# Patient Record
Sex: MALE | Race: WHITE | NOT HISPANIC OR LATINO | Employment: OTHER | URBAN - METROPOLITAN AREA
[De-identification: names, ages, dates, MRNs, and addresses within clinical notes are randomized per-mention and may not be internally consistent; named-entity substitution may affect disease eponyms.]

---

## 2017-01-14 ENCOUNTER — COMMUNICATION - HEALTHEAST (OUTPATIENT)
Dept: FAMILY MEDICINE | Facility: CLINIC | Age: 67
End: 2017-01-14

## 2017-01-14 DIAGNOSIS — E78.00 PURE HYPERCHOLESTEROLEMIA: ICD-10-CM

## 2017-03-25 ENCOUNTER — COMMUNICATION - HEALTHEAST (OUTPATIENT)
Dept: FAMILY MEDICINE | Facility: CLINIC | Age: 67
End: 2017-03-25

## 2017-03-27 ENCOUNTER — COMMUNICATION - HEALTHEAST (OUTPATIENT)
Dept: FAMILY MEDICINE | Facility: CLINIC | Age: 67
End: 2017-03-27

## 2017-04-17 ENCOUNTER — OFFICE VISIT - HEALTHEAST (OUTPATIENT)
Dept: FAMILY MEDICINE | Facility: CLINIC | Age: 67
End: 2017-04-17

## 2017-04-17 DIAGNOSIS — M79.644 THUMB PAIN, RIGHT: ICD-10-CM

## 2017-04-17 DIAGNOSIS — R20.0 LEFT ARM NUMBNESS: ICD-10-CM

## 2017-04-17 ASSESSMENT — MIFFLIN-ST. JEOR: SCORE: 1699.73

## 2017-04-21 ENCOUNTER — RECORDS - HEALTHEAST (OUTPATIENT)
Dept: ADMINISTRATIVE | Facility: OTHER | Age: 67
End: 2017-04-21

## 2017-05-24 ENCOUNTER — RECORDS - HEALTHEAST (OUTPATIENT)
Dept: ADMINISTRATIVE | Facility: OTHER | Age: 67
End: 2017-05-24

## 2017-06-08 ENCOUNTER — RECORDS - HEALTHEAST (OUTPATIENT)
Dept: ADMINISTRATIVE | Facility: OTHER | Age: 67
End: 2017-06-08

## 2017-07-31 ENCOUNTER — COMMUNICATION - HEALTHEAST (OUTPATIENT)
Dept: FAMILY MEDICINE | Facility: CLINIC | Age: 67
End: 2017-07-31

## 2017-07-31 DIAGNOSIS — L98.9 SKIN LESION: ICD-10-CM

## 2017-08-21 ENCOUNTER — COMMUNICATION - HEALTHEAST (OUTPATIENT)
Dept: FAMILY MEDICINE | Facility: CLINIC | Age: 67
End: 2017-08-21

## 2017-08-21 DIAGNOSIS — I10 ESSENTIAL HYPERTENSION, BENIGN: ICD-10-CM

## 2017-08-30 ENCOUNTER — OFFICE VISIT - HEALTHEAST (OUTPATIENT)
Dept: FAMILY MEDICINE | Facility: CLINIC | Age: 67
End: 2017-08-30

## 2017-08-30 DIAGNOSIS — K21.00 REFLUX ESOPHAGITIS: ICD-10-CM

## 2017-08-30 DIAGNOSIS — M47.817 LUMBOSACRAL SPONDYLOSIS WITHOUT MYELOPATHY: ICD-10-CM

## 2017-08-30 DIAGNOSIS — I10 ESSENTIAL HYPERTENSION, BENIGN: ICD-10-CM

## 2017-08-30 DIAGNOSIS — E78.00 PURE HYPERCHOLESTEROLEMIA: ICD-10-CM

## 2017-08-30 DIAGNOSIS — I25.10 CORONARY ATHEROSCLEROSIS: ICD-10-CM

## 2017-08-30 LAB — LDLC SERPL CALC-MCNC: 87 MG/DL

## 2017-08-30 ASSESSMENT — MIFFLIN-ST. JEOR: SCORE: 1700.86

## 2017-09-29 ENCOUNTER — COMMUNICATION - HEALTHEAST (OUTPATIENT)
Dept: ADMINISTRATIVE | Facility: CLINIC | Age: 67
End: 2017-09-29

## 2017-10-17 ENCOUNTER — COMMUNICATION - HEALTHEAST (OUTPATIENT)
Dept: FAMILY MEDICINE | Facility: CLINIC | Age: 67
End: 2017-10-17

## 2017-10-17 DIAGNOSIS — E78.00 PURE HYPERCHOLESTEROLEMIA: ICD-10-CM

## 2017-10-31 ENCOUNTER — COMMUNICATION - HEALTHEAST (OUTPATIENT)
Dept: FAMILY MEDICINE | Facility: CLINIC | Age: 67
End: 2017-10-31

## 2017-11-07 ENCOUNTER — RECORDS - HEALTHEAST (OUTPATIENT)
Dept: ADMINISTRATIVE | Facility: OTHER | Age: 67
End: 2017-11-07

## 2017-11-15 ENCOUNTER — COMMUNICATION - HEALTHEAST (OUTPATIENT)
Dept: CARDIOLOGY | Facility: CLINIC | Age: 67
End: 2017-11-15

## 2017-11-15 DIAGNOSIS — I10 HYPERTENSION: ICD-10-CM

## 2017-11-17 ENCOUNTER — COMMUNICATION - HEALTHEAST (OUTPATIENT)
Dept: FAMILY MEDICINE | Facility: CLINIC | Age: 67
End: 2017-11-17

## 2017-11-17 DIAGNOSIS — I10 HYPERTENSION: ICD-10-CM

## 2017-12-15 ENCOUNTER — COMMUNICATION - HEALTHEAST (OUTPATIENT)
Dept: CARDIOLOGY | Facility: CLINIC | Age: 67
End: 2017-12-15

## 2017-12-15 DIAGNOSIS — I10 HYPERTENSION: ICD-10-CM

## 2017-12-28 ENCOUNTER — OFFICE VISIT - HEALTHEAST (OUTPATIENT)
Dept: CARDIOLOGY | Facility: CLINIC | Age: 67
End: 2017-12-28

## 2017-12-28 DIAGNOSIS — I10 ESSENTIAL HYPERTENSION: ICD-10-CM

## 2017-12-28 DIAGNOSIS — I25.10 CORONARY ARTERY DISEASE INVOLVING NATIVE CORONARY ARTERY OF NATIVE HEART WITHOUT ANGINA PECTORIS: ICD-10-CM

## 2017-12-28 DIAGNOSIS — E78.5 DYSLIPIDEMIA: ICD-10-CM

## 2017-12-28 ASSESSMENT — MIFFLIN-ST. JEOR: SCORE: 1719.01

## 2018-01-19 ENCOUNTER — RECORDS - HEALTHEAST (OUTPATIENT)
Dept: ADMINISTRATIVE | Facility: OTHER | Age: 68
End: 2018-01-19

## 2018-02-09 ENCOUNTER — COMMUNICATION - HEALTHEAST (OUTPATIENT)
Dept: FAMILY MEDICINE | Facility: CLINIC | Age: 68
End: 2018-02-09

## 2018-02-09 DIAGNOSIS — I10 ESSENTIAL HYPERTENSION, BENIGN: ICD-10-CM

## 2018-04-18 ENCOUNTER — OFFICE VISIT - HEALTHEAST (OUTPATIENT)
Dept: FAMILY MEDICINE | Facility: CLINIC | Age: 68
End: 2018-04-18

## 2018-04-18 DIAGNOSIS — I25.10 CORONARY ATHEROSCLEROSIS: ICD-10-CM

## 2018-04-18 DIAGNOSIS — M47.817 LUMBOSACRAL SPONDYLOSIS WITHOUT MYELOPATHY: ICD-10-CM

## 2018-04-18 DIAGNOSIS — E78.00 PURE HYPERCHOLESTEROLEMIA: ICD-10-CM

## 2018-04-18 DIAGNOSIS — M54.9 BACK PAIN: ICD-10-CM

## 2018-04-18 DIAGNOSIS — I10 ESSENTIAL HYPERTENSION, BENIGN: ICD-10-CM

## 2018-04-18 ASSESSMENT — MIFFLIN-ST. JEOR: SCORE: 1709.94

## 2018-04-19 ENCOUNTER — AMBULATORY - HEALTHEAST (OUTPATIENT)
Dept: LAB | Facility: CLINIC | Age: 68
End: 2018-04-19

## 2018-04-19 DIAGNOSIS — M54.9 BACK PAIN: ICD-10-CM

## 2018-04-19 DIAGNOSIS — M47.817 LUMBOSACRAL SPONDYLOSIS WITHOUT MYELOPATHY: ICD-10-CM

## 2018-04-19 DIAGNOSIS — I10 ESSENTIAL HYPERTENSION, BENIGN: ICD-10-CM

## 2018-04-19 DIAGNOSIS — E78.00 PURE HYPERCHOLESTEROLEMIA: ICD-10-CM

## 2018-04-19 DIAGNOSIS — I25.10 CORONARY ATHEROSCLEROSIS: ICD-10-CM

## 2018-04-19 LAB
ALBUMIN SERPL-MCNC: 3.9 G/DL (ref 3.5–5)
ALP SERPL-CCNC: 86 U/L (ref 45–120)
ALT SERPL W P-5'-P-CCNC: 37 U/L (ref 0–45)
ANION GAP SERPL CALCULATED.3IONS-SCNC: 13 MMOL/L (ref 5–18)
AST SERPL W P-5'-P-CCNC: 26 U/L (ref 0–40)
BILIRUB DIRECT SERPL-MCNC: 0.2 MG/DL
BILIRUB SERPL-MCNC: 0.6 MG/DL (ref 0–1)
BUN SERPL-MCNC: 11 MG/DL (ref 8–22)
CALCIUM SERPL-MCNC: 9.9 MG/DL (ref 8.5–10.5)
CHLORIDE BLD-SCNC: 103 MMOL/L (ref 98–107)
CHOLEST SERPL-MCNC: 129 MG/DL
CO2 SERPL-SCNC: 25 MMOL/L (ref 22–31)
CREAT SERPL-MCNC: 0.84 MG/DL (ref 0.7–1.3)
FASTING STATUS PATIENT QL REPORTED: YES
GFR SERPL CREATININE-BSD FRML MDRD: >60 ML/MIN/1.73M2
GLUCOSE BLD-MCNC: 89 MG/DL (ref 70–125)
HDLC SERPL-MCNC: 45 MG/DL
HGB BLD-MCNC: 15 G/DL (ref 14–18)
LDLC SERPL CALC-MCNC: 62 MG/DL
POTASSIUM BLD-SCNC: 4.8 MMOL/L (ref 3.5–5)
PROT SERPL-MCNC: 7 G/DL (ref 6–8)
SODIUM SERPL-SCNC: 141 MMOL/L (ref 136–145)
TRIGL SERPL-MCNC: 108 MG/DL

## 2018-04-20 LAB
25(OH)D3 SERPL-MCNC: 38 NG/ML (ref 30–80)
25(OH)D3 SERPL-MCNC: 38 NG/ML (ref 30–80)

## 2018-04-23 ENCOUNTER — COMMUNICATION - HEALTHEAST (OUTPATIENT)
Dept: FAMILY MEDICINE | Facility: CLINIC | Age: 68
End: 2018-04-23

## 2018-04-23 DIAGNOSIS — E78.00 PURE HYPERCHOLESTEROLEMIA: ICD-10-CM

## 2018-04-24 ENCOUNTER — COMMUNICATION - HEALTHEAST (OUTPATIENT)
Dept: CARDIOLOGY | Facility: CLINIC | Age: 68
End: 2018-04-24

## 2018-04-24 DIAGNOSIS — I10 HYPERTENSION: ICD-10-CM

## 2018-06-15 ENCOUNTER — RECORDS - HEALTHEAST (OUTPATIENT)
Dept: ADMINISTRATIVE | Facility: OTHER | Age: 68
End: 2018-06-15

## 2018-07-30 ENCOUNTER — COMMUNICATION - HEALTHEAST (OUTPATIENT)
Dept: FAMILY MEDICINE | Facility: CLINIC | Age: 68
End: 2018-07-30

## 2018-07-30 DIAGNOSIS — I10 ESSENTIAL HYPERTENSION, BENIGN: ICD-10-CM

## 2018-08-07 ENCOUNTER — RECORDS - HEALTHEAST (OUTPATIENT)
Dept: ADMINISTRATIVE | Facility: OTHER | Age: 68
End: 2018-08-07

## 2018-09-20 ENCOUNTER — COMMUNICATION - HEALTHEAST (OUTPATIENT)
Dept: CARDIOLOGY | Facility: CLINIC | Age: 68
End: 2018-09-20

## 2018-09-20 ENCOUNTER — COMMUNICATION - HEALTHEAST (OUTPATIENT)
Dept: FAMILY MEDICINE | Facility: CLINIC | Age: 68
End: 2018-09-20

## 2018-09-26 ENCOUNTER — AMBULATORY - HEALTHEAST (OUTPATIENT)
Dept: FAMILY MEDICINE | Facility: CLINIC | Age: 68
End: 2018-09-26

## 2018-09-26 ENCOUNTER — AMBULATORY - HEALTHEAST (OUTPATIENT)
Dept: NURSING | Facility: CLINIC | Age: 68
End: 2018-09-26

## 2018-09-26 ENCOUNTER — RECORDS - HEALTHEAST (OUTPATIENT)
Dept: ADMINISTRATIVE | Facility: OTHER | Age: 68
End: 2018-09-26

## 2018-09-26 DIAGNOSIS — Z23 NEED FOR VACCINATION: ICD-10-CM

## 2018-11-14 ENCOUNTER — OFFICE VISIT - HEALTHEAST (OUTPATIENT)
Dept: FAMILY MEDICINE | Facility: CLINIC | Age: 68
End: 2018-11-14

## 2018-11-14 DIAGNOSIS — M17.11 PRIMARY OSTEOARTHRITIS OF RIGHT KNEE: ICD-10-CM

## 2018-11-14 DIAGNOSIS — I10 ESSENTIAL HYPERTENSION, BENIGN: ICD-10-CM

## 2018-11-14 DIAGNOSIS — I25.10 ATHEROSCLEROSIS OF NATIVE CORONARY ARTERY OF NATIVE HEART WITHOUT ANGINA PECTORIS: ICD-10-CM

## 2018-11-14 DIAGNOSIS — E78.00 PURE HYPERCHOLESTEROLEMIA: ICD-10-CM

## 2018-11-14 DIAGNOSIS — N52.8 OTHER MALE ERECTILE DYSFUNCTION: ICD-10-CM

## 2018-11-14 DIAGNOSIS — M47.817 LUMBOSACRAL SPONDYLOSIS WITHOUT MYELOPATHY: ICD-10-CM

## 2018-11-14 LAB
ALBUMIN SERPL-MCNC: 4.1 G/DL (ref 3.5–5)
ALP SERPL-CCNC: 97 U/L (ref 45–120)
ALT SERPL W P-5'-P-CCNC: 27 U/L (ref 0–45)
ANION GAP SERPL CALCULATED.3IONS-SCNC: 11 MMOL/L (ref 5–18)
AST SERPL W P-5'-P-CCNC: 22 U/L (ref 0–40)
BILIRUB DIRECT SERPL-MCNC: <0.1 MG/DL
BILIRUB SERPL-MCNC: 0.3 MG/DL (ref 0–1)
BUN SERPL-MCNC: 14 MG/DL (ref 8–22)
CALCIUM SERPL-MCNC: 9.8 MG/DL (ref 8.5–10.5)
CHLORIDE BLD-SCNC: 101 MMOL/L (ref 98–107)
CO2 SERPL-SCNC: 29 MMOL/L (ref 22–31)
CREAT SERPL-MCNC: 0.82 MG/DL (ref 0.7–1.3)
GFR SERPL CREATININE-BSD FRML MDRD: >60 ML/MIN/1.73M2
GLUCOSE BLD-MCNC: 130 MG/DL (ref 70–125)
LDLC SERPL CALC-MCNC: 85 MG/DL
POTASSIUM BLD-SCNC: 4.2 MMOL/L (ref 3.5–5)
PROT SERPL-MCNC: 7.1 G/DL (ref 6–8)
SODIUM SERPL-SCNC: 141 MMOL/L (ref 136–145)

## 2018-11-14 RX ORDER — SILDENAFIL 50 MG/1
50 TABLET, FILM COATED ORAL DAILY PRN
Qty: 16 TABLET | Refills: 5 | Status: SHIPPED | OUTPATIENT
Start: 2018-11-14

## 2018-12-18 ENCOUNTER — COMMUNICATION - HEALTHEAST (OUTPATIENT)
Dept: CARDIOLOGY | Facility: CLINIC | Age: 68
End: 2018-12-18

## 2018-12-18 DIAGNOSIS — I25.10 ATHEROSCLEROSIS OF NATIVE CORONARY ARTERY OF NATIVE HEART WITHOUT ANGINA PECTORIS: ICD-10-CM

## 2018-12-21 ENCOUNTER — HOSPITAL ENCOUNTER (OUTPATIENT)
Dept: CARDIOLOGY | Facility: HOSPITAL | Age: 68
Discharge: HOME | End: 2018-12-21
Attending: INTERNAL MEDICINE

## 2018-12-21 ENCOUNTER — COMMUNICATION - HEALTHEAST (OUTPATIENT)
Dept: CARDIOLOGY | Facility: CLINIC | Age: 68
End: 2018-12-21

## 2018-12-21 DIAGNOSIS — I10 HYPERTENSION: ICD-10-CM

## 2018-12-21 DIAGNOSIS — I25.10 ATHEROSCLEROSIS OF NATIVE CORONARY ARTERY OF NATIVE HEART WITHOUT ANGINA PECTORIS: ICD-10-CM

## 2018-12-21 LAB
CV STRESS CURRENT BP HE: NORMAL
CV STRESS CURRENT HR HE: 102
CV STRESS CURRENT HR HE: 105
CV STRESS CURRENT HR HE: 106
CV STRESS CURRENT HR HE: 107
CV STRESS CURRENT HR HE: 107
CV STRESS CURRENT HR HE: 114
CV STRESS CURRENT HR HE: 115
CV STRESS CURRENT HR HE: 117
CV STRESS CURRENT HR HE: 118
CV STRESS CURRENT HR HE: 119
CV STRESS CURRENT HR HE: 120
CV STRESS CURRENT HR HE: 124
CV STRESS CURRENT HR HE: 128
CV STRESS CURRENT HR HE: 129
CV STRESS CURRENT HR HE: 129
CV STRESS CURRENT HR HE: 132
CV STRESS CURRENT HR HE: 134
CV STRESS CURRENT HR HE: 137
CV STRESS CURRENT HR HE: 137
CV STRESS CURRENT HR HE: 86
CV STRESS CURRENT HR HE: 87
CV STRESS CURRENT HR HE: 88
CV STRESS CURRENT HR HE: 88
CV STRESS CURRENT HR HE: 90
CV STRESS CURRENT HR HE: 91
CV STRESS CURRENT HR HE: 92
CV STRESS CURRENT HR HE: 92
CV STRESS CURRENT HR HE: 96
CV STRESS CURRENT HR HE: 98
CV STRESS CURRENT HR HE: 99
CV STRESS DEVIATION TIME HE: NORMAL
CV STRESS ECHO PERCENT HR HE: NORMAL
CV STRESS EXERCISE STAGE HE: NORMAL
CV STRESS EXERCISE STAGE REACHED HE: NORMAL
CV STRESS FINAL RESTING BP HE: NORMAL
CV STRESS FINAL RESTING HR HE: 90
CV STRESS MAX HR HE: 138
CV STRESS MAX TREADMILL GRADE HE: 16
CV STRESS MAX TREADMILL SPEED HE: 4.2
CV STRESS PEAK DIA BP HE: NORMAL
CV STRESS PEAK SYS BP HE: NORMAL
CV STRESS PHASE HE: NORMAL
CV STRESS PROTOCOL HE: NORMAL
CV STRESS RESTING PT POSITION HE: NORMAL
CV STRESS RESTING PT POSITION HE: NORMAL
CV STRESS ST DEVIATION AMOUNT HE: NORMAL
CV STRESS ST DEVIATION ELEVATION HE: NORMAL
CV STRESS ST EVELATION AMOUNT HE: NORMAL
CV STRESS TEST TYPE HE: NORMAL
CV STRESS TOTAL STAGE TIME MIN 1 HE: NORMAL
STRESS ECHO BASELINE BP: NORMAL
STRESS ECHO BASELINE HR: 89
STRESS ECHO CALCULATED PERCENT HR: 92 %
STRESS ECHO LAST STRESS BP: NORMAL
STRESS ECHO LAST STRESS HR: 137
STRESS ECHO POST ESTIMATED WORKLOAD: 10.5
STRESS ECHO POST EXERCISE DUR MIN: 9
STRESS ECHO POST EXERCISE DUR SEC: 0
STRESS ECHO TARGET HR: 138

## 2018-12-24 ENCOUNTER — AMBULATORY - HEALTHEAST (OUTPATIENT)
Dept: FAMILY MEDICINE | Facility: CLINIC | Age: 68
End: 2018-12-24

## 2018-12-27 ENCOUNTER — COMMUNICATION - HEALTHEAST (OUTPATIENT)
Dept: CARDIOLOGY | Facility: CLINIC | Age: 68
End: 2018-12-27

## 2018-12-28 ENCOUNTER — OFFICE VISIT - HEALTHEAST (OUTPATIENT)
Dept: CARDIOLOGY | Facility: CLINIC | Age: 68
End: 2018-12-28

## 2018-12-28 DIAGNOSIS — E78.5 DYSLIPIDEMIA: ICD-10-CM

## 2018-12-28 DIAGNOSIS — I25.10 CORONARY ARTERY DISEASE INVOLVING NATIVE CORONARY ARTERY OF NATIVE HEART WITHOUT ANGINA PECTORIS: ICD-10-CM

## 2018-12-28 DIAGNOSIS — I10 ESSENTIAL HYPERTENSION: ICD-10-CM

## 2018-12-28 ASSESSMENT — MIFFLIN-ST. JEOR: SCORE: 1714.47

## 2019-01-24 ENCOUNTER — OFFICE VISIT - HEALTHEAST (OUTPATIENT)
Dept: FAMILY MEDICINE | Facility: CLINIC | Age: 69
End: 2019-01-24

## 2019-01-24 DIAGNOSIS — M70.61 TROCHANTERIC BURSITIS OF BOTH HIPS: ICD-10-CM

## 2019-01-24 DIAGNOSIS — M70.62 TROCHANTERIC BURSITIS OF BOTH HIPS: ICD-10-CM

## 2019-01-29 ENCOUNTER — OFFICE VISIT - HEALTHEAST (OUTPATIENT)
Dept: FAMILY MEDICINE | Facility: CLINIC | Age: 69
End: 2019-01-29

## 2019-01-29 DIAGNOSIS — M70.61 TROCHANTERIC BURSITIS OF BOTH HIPS: ICD-10-CM

## 2019-01-29 DIAGNOSIS — M70.62 TROCHANTERIC BURSITIS OF BOTH HIPS: ICD-10-CM

## 2019-02-12 ENCOUNTER — COMMUNICATION - HEALTHEAST (OUTPATIENT)
Dept: CARDIOLOGY | Facility: CLINIC | Age: 69
End: 2019-02-12

## 2019-02-12 DIAGNOSIS — I10 HYPERTENSION: ICD-10-CM

## 2019-04-24 ENCOUNTER — COMMUNICATION - HEALTHEAST (OUTPATIENT)
Dept: FAMILY MEDICINE | Facility: CLINIC | Age: 69
End: 2019-04-24

## 2019-04-24 DIAGNOSIS — I10 ESSENTIAL HYPERTENSION, BENIGN: ICD-10-CM

## 2019-04-24 DIAGNOSIS — E78.00 PURE HYPERCHOLESTEROLEMIA: ICD-10-CM

## 2019-05-23 ENCOUNTER — OFFICE VISIT - HEALTHEAST (OUTPATIENT)
Dept: FAMILY MEDICINE | Facility: CLINIC | Age: 69
End: 2019-05-23

## 2019-05-23 DIAGNOSIS — M17.11 PRIMARY OSTEOARTHRITIS OF RIGHT KNEE: ICD-10-CM

## 2019-05-23 DIAGNOSIS — I25.10 ATHEROSCLEROSIS OF NATIVE CORONARY ARTERY OF NATIVE HEART WITHOUT ANGINA PECTORIS: ICD-10-CM

## 2019-05-23 DIAGNOSIS — E78.00 PURE HYPERCHOLESTEROLEMIA: ICD-10-CM

## 2019-05-23 DIAGNOSIS — M47.817 LUMBOSACRAL SPONDYLOSIS WITHOUT MYELOPATHY: ICD-10-CM

## 2019-05-23 DIAGNOSIS — I10 ESSENTIAL HYPERTENSION, BENIGN: ICD-10-CM

## 2019-05-23 DIAGNOSIS — R73.9 HYPERGLYCEMIA: ICD-10-CM

## 2019-05-23 DIAGNOSIS — Z13.1 SCREENING FOR DIABETES MELLITUS: ICD-10-CM

## 2019-05-23 ASSESSMENT — MIFFLIN-ST. JEOR: SCORE: 1700.86

## 2019-06-17 ENCOUNTER — RECORDS - HEALTHEAST (OUTPATIENT)
Dept: ADMINISTRATIVE | Facility: OTHER | Age: 69
End: 2019-06-17

## 2019-06-27 ENCOUNTER — AMBULATORY - HEALTHEAST (OUTPATIENT)
Dept: LAB | Facility: CLINIC | Age: 69
End: 2019-06-27

## 2019-06-27 DIAGNOSIS — E78.00 PURE HYPERCHOLESTEROLEMIA: ICD-10-CM

## 2019-06-27 DIAGNOSIS — M17.11 PRIMARY OSTEOARTHRITIS OF RIGHT KNEE: ICD-10-CM

## 2019-06-27 DIAGNOSIS — I10 ESSENTIAL HYPERTENSION, BENIGN: ICD-10-CM

## 2019-06-27 DIAGNOSIS — Z13.1 SCREENING FOR DIABETES MELLITUS: ICD-10-CM

## 2019-06-27 DIAGNOSIS — R73.9 HYPERGLYCEMIA: ICD-10-CM

## 2019-06-27 DIAGNOSIS — I25.10 ATHEROSCLEROSIS OF NATIVE CORONARY ARTERY OF NATIVE HEART WITHOUT ANGINA PECTORIS: ICD-10-CM

## 2019-06-27 DIAGNOSIS — M47.817 LUMBOSACRAL SPONDYLOSIS WITHOUT MYELOPATHY: ICD-10-CM

## 2019-06-27 LAB
ALBUMIN SERPL-MCNC: 4.1 G/DL (ref 3.5–5)
ALP SERPL-CCNC: 67 U/L (ref 45–120)
ALT SERPL W P-5'-P-CCNC: 26 U/L (ref 0–45)
ANION GAP SERPL CALCULATED.3IONS-SCNC: 7 MMOL/L (ref 5–18)
AST SERPL W P-5'-P-CCNC: 21 U/L (ref 0–40)
BILIRUB DIRECT SERPL-MCNC: 0.3 MG/DL
BILIRUB SERPL-MCNC: 0.7 MG/DL (ref 0–1)
BUN SERPL-MCNC: 11 MG/DL (ref 8–28)
CALCIUM SERPL-MCNC: 9.6 MG/DL (ref 8.5–10.5)
CHLORIDE BLD-SCNC: 104 MMOL/L (ref 98–107)
CHOLEST SERPL-MCNC: 133 MG/DL
CO2 SERPL-SCNC: 28 MMOL/L (ref 22–31)
CREAT SERPL-MCNC: 0.83 MG/DL (ref 0.7–1.3)
FASTING STATUS PATIENT QL REPORTED: YES
GFR SERPL CREATININE-BSD FRML MDRD: >60 ML/MIN/1.73M2
GLUCOSE BLD-MCNC: 88 MG/DL (ref 70–125)
HBA1C MFR BLD: 5.2 % (ref 3.5–6)
HDLC SERPL-MCNC: 43 MG/DL
LDLC SERPL CALC-MCNC: 70 MG/DL
POTASSIUM BLD-SCNC: 4.2 MMOL/L (ref 3.5–5)
PROT SERPL-MCNC: 6.4 G/DL (ref 6–8)
SODIUM SERPL-SCNC: 139 MMOL/L (ref 136–145)
TRIGL SERPL-MCNC: 98 MG/DL

## 2019-07-23 ENCOUNTER — COMMUNICATION - HEALTHEAST (OUTPATIENT)
Dept: FAMILY MEDICINE | Facility: CLINIC | Age: 69
End: 2019-07-23

## 2019-07-23 DIAGNOSIS — E78.00 PURE HYPERCHOLESTEROLEMIA: ICD-10-CM

## 2019-07-23 DIAGNOSIS — I10 ESSENTIAL HYPERTENSION, BENIGN: ICD-10-CM

## 2019-11-05 ENCOUNTER — AMBULATORY - HEALTHEAST (OUTPATIENT)
Dept: NURSING | Facility: CLINIC | Age: 69
End: 2019-11-05

## 2019-11-05 DIAGNOSIS — Z23 FLU VACCINE NEED: ICD-10-CM

## 2019-12-09 ENCOUNTER — COMMUNICATION - HEALTHEAST (OUTPATIENT)
Dept: CARDIOLOGY | Facility: CLINIC | Age: 69
End: 2019-12-09

## 2019-12-09 DIAGNOSIS — I10 HYPERTENSION: ICD-10-CM

## 2019-12-09 DIAGNOSIS — I10 ESSENTIAL HYPERTENSION: ICD-10-CM

## 2019-12-16 ENCOUNTER — OFFICE VISIT - HEALTHEAST (OUTPATIENT)
Dept: FAMILY MEDICINE | Facility: CLINIC | Age: 69
End: 2019-12-16

## 2019-12-16 DIAGNOSIS — E78.00 PURE HYPERCHOLESTEROLEMIA: ICD-10-CM

## 2019-12-16 DIAGNOSIS — I10 ESSENTIAL HYPERTENSION, BENIGN: ICD-10-CM

## 2019-12-16 DIAGNOSIS — I25.10 ATHEROSCLEROSIS OF NATIVE CORONARY ARTERY OF NATIVE HEART WITHOUT ANGINA PECTORIS: ICD-10-CM

## 2019-12-16 DIAGNOSIS — G56.03 BILATERAL CARPAL TUNNEL SYNDROME: ICD-10-CM

## 2019-12-16 ASSESSMENT — MIFFLIN-ST. JEOR: SCORE: 1705.4

## 2019-12-17 LAB
ALBUMIN SERPL-MCNC: 4.3 G/DL (ref 3.5–5)
ALP SERPL-CCNC: 82 U/L (ref 45–120)
ALT SERPL W P-5'-P-CCNC: 28 U/L (ref 0–45)
ANION GAP SERPL CALCULATED.3IONS-SCNC: 9 MMOL/L (ref 5–18)
AST SERPL W P-5'-P-CCNC: 22 U/L (ref 0–40)
BILIRUB DIRECT SERPL-MCNC: 0.1 MG/DL
BILIRUB SERPL-MCNC: 0.4 MG/DL (ref 0–1)
BUN SERPL-MCNC: 16 MG/DL (ref 8–28)
CALCIUM SERPL-MCNC: 9.7 MG/DL (ref 8.5–10.5)
CHLORIDE BLD-SCNC: 102 MMOL/L (ref 98–107)
CO2 SERPL-SCNC: 29 MMOL/L (ref 22–31)
CREAT SERPL-MCNC: 0.98 MG/DL (ref 0.7–1.3)
GFR SERPL CREATININE-BSD FRML MDRD: >60 ML/MIN/1.73M2
GLUCOSE BLD-MCNC: 85 MG/DL (ref 70–125)
LDLC SERPL CALC-MCNC: 82 MG/DL
POTASSIUM BLD-SCNC: 4.1 MMOL/L (ref 3.5–5)
PROT SERPL-MCNC: 6.9 G/DL (ref 6–8)
SODIUM SERPL-SCNC: 140 MMOL/L (ref 136–145)

## 2020-01-02 ENCOUNTER — OFFICE VISIT - HEALTHEAST (OUTPATIENT)
Dept: CARDIOLOGY | Facility: CLINIC | Age: 70
End: 2020-01-02

## 2020-01-02 DIAGNOSIS — E78.5 DYSLIPIDEMIA: ICD-10-CM

## 2020-01-02 DIAGNOSIS — I10 ESSENTIAL HYPERTENSION: ICD-10-CM

## 2020-01-02 DIAGNOSIS — I25.10 CORONARY ARTERY DISEASE INVOLVING NATIVE CORONARY ARTERY OF NATIVE HEART WITHOUT ANGINA PECTORIS: ICD-10-CM

## 2020-01-02 ASSESSMENT — MIFFLIN-ST. JEOR: SCORE: 1723.54

## 2020-01-14 ENCOUNTER — COMMUNICATION - HEALTHEAST (OUTPATIENT)
Dept: SCHEDULING | Facility: CLINIC | Age: 70
End: 2020-01-14

## 2020-01-15 ENCOUNTER — RECORDS - HEALTHEAST (OUTPATIENT)
Dept: GENERAL RADIOLOGY | Facility: CLINIC | Age: 70
End: 2020-01-15

## 2020-01-15 ENCOUNTER — OFFICE VISIT - HEALTHEAST (OUTPATIENT)
Dept: FAMILY MEDICINE | Facility: CLINIC | Age: 70
End: 2020-01-15

## 2020-01-15 DIAGNOSIS — J18.9 COMMUNITY ACQUIRED PNEUMONIA, UNSPECIFIED LATERALITY: ICD-10-CM

## 2020-01-15 DIAGNOSIS — J18.9 PNEUMONIA, UNSPECIFIED ORGANISM: ICD-10-CM

## 2020-03-11 ENCOUNTER — COMMUNICATION - HEALTHEAST (OUTPATIENT)
Dept: CARDIOLOGY | Facility: CLINIC | Age: 70
End: 2020-03-11

## 2020-03-11 DIAGNOSIS — I10 HYPERTENSION: ICD-10-CM

## 2020-06-22 ENCOUNTER — OFFICE VISIT - HEALTHEAST (OUTPATIENT)
Dept: FAMILY MEDICINE | Facility: CLINIC | Age: 70
End: 2020-06-22

## 2020-06-22 DIAGNOSIS — I10 ESSENTIAL HYPERTENSION, BENIGN: ICD-10-CM

## 2020-06-22 DIAGNOSIS — M79.644 CHRONIC PAIN OF RIGHT THUMB: ICD-10-CM

## 2020-06-22 DIAGNOSIS — E78.00 PURE HYPERCHOLESTEROLEMIA: ICD-10-CM

## 2020-06-22 DIAGNOSIS — I25.10 ATHEROSCLEROSIS OF NATIVE CORONARY ARTERY OF NATIVE HEART WITHOUT ANGINA PECTORIS: ICD-10-CM

## 2020-06-22 DIAGNOSIS — M25.552 BILATERAL HIP PAIN: ICD-10-CM

## 2020-06-22 DIAGNOSIS — L72.3 SEBACEOUS CYST: ICD-10-CM

## 2020-06-22 DIAGNOSIS — G89.29 CHRONIC PAIN OF RIGHT THUMB: ICD-10-CM

## 2020-06-22 DIAGNOSIS — M25.551 BILATERAL HIP PAIN: ICD-10-CM

## 2020-06-22 DIAGNOSIS — K21.00 REFLUX ESOPHAGITIS: ICD-10-CM

## 2020-07-12 ENCOUNTER — COMMUNICATION - HEALTHEAST (OUTPATIENT)
Dept: FAMILY MEDICINE | Facility: CLINIC | Age: 70
End: 2020-07-12

## 2020-07-12 DIAGNOSIS — E78.00 PURE HYPERCHOLESTEROLEMIA: ICD-10-CM

## 2020-07-12 DIAGNOSIS — I10 ESSENTIAL HYPERTENSION, BENIGN: ICD-10-CM

## 2020-07-13 ENCOUNTER — COMMUNICATION - HEALTHEAST (OUTPATIENT)
Dept: FAMILY MEDICINE | Facility: CLINIC | Age: 70
End: 2020-07-13

## 2020-07-13 RX ORDER — ATORVASTATIN CALCIUM 80 MG/1
TABLET, FILM COATED ORAL
Qty: 45 TABLET | Refills: 3 | Status: SHIPPED | OUTPATIENT
Start: 2020-07-13 | End: 2021-07-23

## 2020-09-02 ENCOUNTER — RECORDS - HEALTHEAST (OUTPATIENT)
Dept: ADMINISTRATIVE | Facility: OTHER | Age: 70
End: 2020-09-02

## 2020-09-29 ENCOUNTER — COMMUNICATION - HEALTHEAST (OUTPATIENT)
Dept: CARDIOLOGY | Facility: CLINIC | Age: 70
End: 2020-09-29

## 2020-09-29 DIAGNOSIS — I25.10 CORONARY ARTERY DISEASE INVOLVING NATIVE CORONARY ARTERY OF NATIVE HEART WITHOUT ANGINA PECTORIS: ICD-10-CM

## 2020-10-13 ENCOUNTER — HOSPITAL ENCOUNTER (OUTPATIENT)
Dept: CARDIOLOGY | Facility: CLINIC | Age: 70
Discharge: HOME | End: 2020-10-13
Attending: INTERNAL MEDICINE

## 2020-10-13 DIAGNOSIS — I25.10 CORONARY ARTERY DISEASE INVOLVING NATIVE CORONARY ARTERY OF NATIVE HEART WITHOUT ANGINA PECTORIS: ICD-10-CM

## 2020-10-13 LAB
CV STRESS CURRENT BP HE: NORMAL
CV STRESS CURRENT HR HE: 105
CV STRESS CURRENT HR HE: 108
CV STRESS CURRENT HR HE: 138
CV STRESS CURRENT HR HE: 149
CV STRESS CURRENT HR HE: 150
CV STRESS CURRENT HR HE: 151
CV STRESS CURRENT HR HE: 152
CV STRESS CURRENT HR HE: 153
CV STRESS CURRENT HR HE: 154
CV STRESS CURRENT HR HE: 156
CV STRESS CURRENT HR HE: 156
CV STRESS CURRENT HR HE: 73
CV STRESS CURRENT HR HE: 77
CV STRESS CURRENT HR HE: 78
CV STRESS CURRENT HR HE: 80
CV STRESS CURRENT HR HE: 80
CV STRESS CURRENT HR HE: 82
CV STRESS CURRENT HR HE: 82
CV STRESS CURRENT HR HE: 84
CV STRESS CURRENT HR HE: 84
CV STRESS CURRENT HR HE: 85
CV STRESS CURRENT HR HE: 89
CV STRESS CURRENT HR HE: 93
CV STRESS CURRENT HR HE: 99
CV STRESS DEVIATION TIME HE: NORMAL
CV STRESS ECHO PERCENT HR HE: NORMAL
CV STRESS EXERCISE STAGE HE: NORMAL
CV STRESS EXERCISE STAGE REACHED HE: NORMAL
CV STRESS FINAL RESTING BP HE: NORMAL
CV STRESS FINAL RESTING HR HE: 84
CV STRESS MAX HR HE: 164
CV STRESS MAX TREADMILL GRADE HE: 16
CV STRESS MAX TREADMILL SPEED HE: 4.2
CV STRESS PEAK DIA BP HE: NORMAL
CV STRESS PEAK SYS BP HE: NORMAL
CV STRESS PHASE HE: NORMAL
CV STRESS PROTOCOL HE: NORMAL
CV STRESS RESTING PT POSITION HE: NORMAL
CV STRESS RESTING PT POSITION HE: NORMAL
CV STRESS ST DEVIATION AMOUNT HE: NORMAL
CV STRESS ST DEVIATION ELEVATION HE: NORMAL
CV STRESS ST EVELATION AMOUNT HE: NORMAL
CV STRESS TEST TYPE HE: NORMAL
CV STRESS TOTAL STAGE TIME MIN 1 HE: NORMAL
RATE PRESSURE PRODUCT: NORMAL
STRESS ECHO BASELINE DIASTOLIC HE: 94
STRESS ECHO BASELINE SYSTOLIC BP: 157
STRESS ECHO CALCULATED PERCENT HR: 110 %
STRESS ECHO LAST STRESS DIASTOLIC BP: 70
STRESS ECHO LAST STRESS HR: 153
STRESS ECHO LAST STRESS SYSTOLIC BP: 192
STRESS ECHO POST ESTIMATED WORKLOAD: 10.5
STRESS ECHO POST EXERCISE DUR MIN: 9
STRESS ECHO POST EXERCISE DUR SEC: 0
STRESS ECHO TARGET HR: 163.9

## 2020-10-19 ENCOUNTER — AMBULATORY - HEALTHEAST (OUTPATIENT)
Dept: CARDIOLOGY | Facility: CLINIC | Age: 70
End: 2020-10-19

## 2020-10-19 DIAGNOSIS — I25.10 CORONARY ARTERY DISEASE INVOLVING NATIVE CORONARY ARTERY OF NATIVE HEART WITHOUT ANGINA PECTORIS: ICD-10-CM

## 2020-10-19 DIAGNOSIS — I47.29 NSVT (NONSUSTAINED VENTRICULAR TACHYCARDIA) (H): ICD-10-CM

## 2020-10-22 ENCOUNTER — AMBULATORY - HEALTHEAST (OUTPATIENT)
Dept: NURSING | Facility: CLINIC | Age: 70
End: 2020-10-22

## 2020-10-22 ENCOUNTER — HOSPITAL ENCOUNTER (OUTPATIENT)
Dept: CARDIOLOGY | Facility: CLINIC | Age: 70
Discharge: HOME | End: 2020-10-22
Attending: INTERNAL MEDICINE

## 2020-10-22 DIAGNOSIS — I25.10 CORONARY ARTERY DISEASE INVOLVING NATIVE CORONARY ARTERY OF NATIVE HEART WITHOUT ANGINA PECTORIS: ICD-10-CM

## 2020-10-22 DIAGNOSIS — I47.29 NSVT (NONSUSTAINED VENTRICULAR TACHYCARDIA) (H): ICD-10-CM

## 2020-10-26 ENCOUNTER — OFFICE VISIT - HEALTHEAST (OUTPATIENT)
Dept: CARDIOLOGY | Facility: CLINIC | Age: 70
End: 2020-10-26

## 2020-10-26 DIAGNOSIS — E78.5 DYSLIPIDEMIA: ICD-10-CM

## 2020-10-26 DIAGNOSIS — I10 ESSENTIAL HYPERTENSION: ICD-10-CM

## 2020-10-26 DIAGNOSIS — I25.10 CORONARY ARTERY DISEASE INVOLVING NATIVE CORONARY ARTERY OF NATIVE HEART WITHOUT ANGINA PECTORIS: ICD-10-CM

## 2020-10-26 DIAGNOSIS — I49.3 PVC'S (PREMATURE VENTRICULAR CONTRACTIONS): ICD-10-CM

## 2020-10-26 DIAGNOSIS — R06.02 SOB (SHORTNESS OF BREATH): ICD-10-CM

## 2020-10-26 RX ORDER — METOPROLOL SUCCINATE 25 MG/1
25 TABLET, EXTENDED RELEASE ORAL DAILY
Qty: 90 TABLET | Refills: 3 | Status: SHIPPED | OUTPATIENT
Start: 2020-10-26 | End: 2021-10-14

## 2020-10-26 ASSESSMENT — MIFFLIN-ST. JEOR: SCORE: 1687.26

## 2020-10-29 ENCOUNTER — COMMUNICATION - HEALTHEAST (OUTPATIENT)
Dept: CARDIOLOGY | Facility: CLINIC | Age: 70
End: 2020-10-29

## 2020-11-26 ENCOUNTER — COMMUNICATION - HEALTHEAST (OUTPATIENT)
Dept: CARDIOLOGY | Facility: CLINIC | Age: 70
End: 2020-11-26

## 2020-11-26 DIAGNOSIS — I10 HYPERTENSION: ICD-10-CM

## 2020-11-27 RX ORDER — LISINOPRIL 20 MG/1
TABLET ORAL
Qty: 180 TABLET | Refills: 2 | Status: SHIPPED | OUTPATIENT
Start: 2020-11-27 | End: 2021-08-17

## 2020-11-28 ENCOUNTER — COMMUNICATION - HEALTHEAST (OUTPATIENT)
Dept: CARDIOLOGY | Facility: CLINIC | Age: 70
End: 2020-11-28

## 2020-11-30 ENCOUNTER — COMMUNICATION - HEALTHEAST (OUTPATIENT)
Dept: CARDIOLOGY | Facility: CLINIC | Age: 70
End: 2020-11-30

## 2020-11-30 DIAGNOSIS — I49.3 PVC'S (PREMATURE VENTRICULAR CONTRACTIONS): ICD-10-CM

## 2020-12-04 ENCOUNTER — HOSPITAL ENCOUNTER (OUTPATIENT)
Dept: CARDIOLOGY | Facility: CLINIC | Age: 70
Discharge: HOME | End: 2020-12-04
Attending: INTERNAL MEDICINE

## 2020-12-04 DIAGNOSIS — I25.10 CORONARY ARTERY DISEASE INVOLVING NATIVE CORONARY ARTERY OF NATIVE HEART WITHOUT ANGINA PECTORIS: ICD-10-CM

## 2020-12-04 DIAGNOSIS — R06.02 SOB (SHORTNESS OF BREATH): ICD-10-CM

## 2020-12-04 LAB
AORTIC ROOT: 3.8 CM
AORTIC VALVE MEAN VELOCITY: 125 CM/S
ASCENDING AORTA: 3.5 CM
AV DIMENSIONLESS INDEX VTI: 1
AV MEAN GRADIENT: 8 MMHG
AV PEAK GRADIENT: 10.9 MMHG
AV VALVE AREA: 3.3 CM2
AV VELOCITY RATIO: 0.8
BSA FOR ECHO PROCEDURE: 2.14 M2
CV BLOOD PRESSURE: ABNORMAL MMHG
CV ECHO HEIGHT: 69 IN
CV ECHO WEIGHT: 207 LBS
DOP CALC AO PEAK VEL: 165 CM/S
DOP CALC AO VTI: 27.6 CM
DOP CALC LVOT AREA: 3.46 CM2
DOP CALC LVOT DIAMETER: 2.1 CM
DOP CALC LVOT PEAK VEL: 124 CM/S
DOP CALC LVOT STROKE VOLUME: 91 CM3
DOP CALCLVOT PEAK VEL VTI: 26.3 CM
EJECTION FRACTION: 57 % (ref 55–75)
FRACTIONAL SHORTENING: 25.6 % (ref 28–44)
INTERVENTRICULAR SEPTUM IN END DIASTOLE: 1.39 CM (ref 0.6–1)
IVS/PW RATIO: 1.7
LA AREA 1: 16.9 CM2
LA AREA 2: 25.9 CM2
LEFT ATRIUM LENGTH: 5.69 CM
LEFT ATRIUM SIZE: 4.9 CM
LEFT ATRIUM VOLUME INDEX: 30.6 ML/M2
LEFT ATRIUM VOLUME: 65.4 ML
LEFT VENTRICLE CARDIAC INDEX: 3.4 L/MIN/M2
LEFT VENTRICLE CARDIAC OUTPUT: 7.4 L/MIN
LEFT VENTRICLE DIASTOLIC VOLUME INDEX: 31.8 CM3/M2 (ref 34–74)
LEFT VENTRICLE DIASTOLIC VOLUME: 68 CM3 (ref 62–150)
LEFT VENTRICLE HEART RATE: 81 BPM
LEFT VENTRICLE MASS INDEX: 91.1 G/M2
LEFT VENTRICLE SYSTOLIC VOLUME INDEX: 13.6 CM3/M2 (ref 11–31)
LEFT VENTRICLE SYSTOLIC VOLUME: 29 CM3 (ref 21–61)
LEFT VENTRICULAR INTERNAL DIMENSION IN DIASTOLE: 4.76 CM (ref 4.2–5.8)
LEFT VENTRICULAR INTERNAL DIMENSION IN SYSTOLE: 3.54 CM (ref 2.5–4)
LEFT VENTRICULAR MASS: 194.9 G
LEFT VENTRICULAR OUTFLOW TRACT MEAN GRADIENT: 4 MMHG
LEFT VENTRICULAR OUTFLOW TRACT MEAN VELOCITY: 86.1 CM/S
LEFT VENTRICULAR OUTFLOW TRACT PEAK GRADIENT: 6 MMHG
LEFT VENTRICULAR POSTERIOR WALL IN END DIASTOLE: 0.84 CM (ref 0.6–1)
LV STROKE VOLUME INDEX: 42.5 ML/M2
MITRAL VALVE E/A RATIO: 0.9
MV AVERAGE E/E' RATIO: 9.7 CM/S
MV DECELERATION TIME: 176 MS
MV E'TISSUE VEL-LAT: 10 CM/S
MV E'TISSUE VEL-MED: 7.7 CM/S
MV LATERAL E/E' RATIO: 8.6
MV MEDIAL E/E' RATIO: 11.2
MV PEAK A VELOCITY: 96.3 CM/S
MV PEAK E VELOCITY: 85.9 CM/S
NUC REST DIASTOLIC VOLUME INDEX: 3312 LBS
NUC REST SYSTOLIC VOLUME INDEX: 69 IN
TRICUSPID VALVE ANULAR PLANE SYSTOLIC EXCURSION: 2.1 CM

## 2020-12-04 ASSESSMENT — MIFFLIN-ST. JEOR: SCORE: 1679.33

## 2020-12-14 ENCOUNTER — COMMUNICATION - HEALTHEAST (OUTPATIENT)
Dept: CARDIOLOGY | Facility: CLINIC | Age: 70
End: 2020-12-14

## 2021-01-05 ENCOUNTER — COMMUNICATION - HEALTHEAST (OUTPATIENT)
Dept: FAMILY MEDICINE | Facility: CLINIC | Age: 71
End: 2021-01-05

## 2021-01-12 ENCOUNTER — COMMUNICATION - HEALTHEAST (OUTPATIENT)
Dept: FAMILY MEDICINE | Facility: CLINIC | Age: 71
End: 2021-01-12

## 2021-01-12 ENCOUNTER — OFFICE VISIT - HEALTHEAST (OUTPATIENT)
Dept: FAMILY MEDICINE | Facility: CLINIC | Age: 71
End: 2021-01-12

## 2021-01-12 DIAGNOSIS — H92.09 EAR ACHE: ICD-10-CM

## 2021-01-12 DIAGNOSIS — I10 ESSENTIAL HYPERTENSION, BENIGN: ICD-10-CM

## 2021-01-12 ASSESSMENT — MIFFLIN-ST. JEOR: SCORE: 1688.4

## 2021-02-02 ENCOUNTER — COMMUNICATION - HEALTHEAST (OUTPATIENT)
Dept: FAMILY MEDICINE | Facility: CLINIC | Age: 71
End: 2021-02-02

## 2021-02-09 ENCOUNTER — COMMUNICATION - HEALTHEAST (OUTPATIENT)
Dept: ADMINISTRATIVE | Facility: CLINIC | Age: 71
End: 2021-02-09

## 2021-02-09 ENCOUNTER — COMMUNICATION - HEALTHEAST (OUTPATIENT)
Dept: SCHEDULING | Facility: CLINIC | Age: 71
End: 2021-02-09

## 2021-02-09 DIAGNOSIS — H93.8X9 SENSATION OF FULLNESS IN EAR, UNSPECIFIED LATERALITY: ICD-10-CM

## 2021-02-09 DIAGNOSIS — H93.8X9 EAR PRESSURE, UNSPECIFIED LATERALITY: ICD-10-CM

## 2021-02-10 ENCOUNTER — COMMUNICATION - HEALTHEAST (OUTPATIENT)
Dept: FAMILY MEDICINE | Facility: CLINIC | Age: 71
End: 2021-02-10

## 2021-02-11 ENCOUNTER — OFFICE VISIT - HEALTHEAST (OUTPATIENT)
Dept: OTOLARYNGOLOGY | Facility: CLINIC | Age: 71
End: 2021-02-11

## 2021-02-11 ENCOUNTER — RECORDS - HEALTHEAST (OUTPATIENT)
Dept: ADMINISTRATIVE | Facility: OTHER | Age: 71
End: 2021-02-11

## 2021-02-11 ENCOUNTER — OFFICE VISIT - HEALTHEAST (OUTPATIENT)
Dept: AUDIOLOGY | Facility: CLINIC | Age: 71
End: 2021-02-11

## 2021-02-11 DIAGNOSIS — G50.1 ATYPICAL FACIAL PAIN: ICD-10-CM

## 2021-02-11 DIAGNOSIS — H69.91 DYSFUNCTION OF RIGHT EUSTACHIAN TUBE: ICD-10-CM

## 2021-02-11 DIAGNOSIS — H90.3 SENSORINEURAL HEARING LOSS (SNHL) OF BOTH EARS: ICD-10-CM

## 2021-02-11 DIAGNOSIS — H93.8X1 SENSATION OF FULLNESS IN RIGHT EAR: ICD-10-CM

## 2021-02-11 DIAGNOSIS — H90.A21 SENSORINEURAL HEARING LOSS (SNHL) OF RIGHT EAR WITH RESTRICTED HEARING OF LEFT EAR: ICD-10-CM

## 2021-02-25 ENCOUNTER — COMMUNICATION - HEALTHEAST (OUTPATIENT)
Dept: FAMILY MEDICINE | Facility: CLINIC | Age: 71
End: 2021-02-25

## 2021-02-25 DIAGNOSIS — I10 ESSENTIAL HYPERTENSION, BENIGN: ICD-10-CM

## 2021-02-26 ENCOUNTER — COMMUNICATION - HEALTHEAST (OUTPATIENT)
Dept: FAMILY MEDICINE | Facility: CLINIC | Age: 71
End: 2021-02-26

## 2021-02-26 DIAGNOSIS — I10 ESSENTIAL HYPERTENSION, BENIGN: ICD-10-CM

## 2021-03-08 ENCOUNTER — COMMUNICATION - HEALTHEAST (OUTPATIENT)
Dept: FAMILY MEDICINE | Facility: CLINIC | Age: 71
End: 2021-03-08

## 2021-03-26 ENCOUNTER — COMMUNICATION - HEALTHEAST (OUTPATIENT)
Dept: INTERNAL MEDICINE | Facility: CLINIC | Age: 71
End: 2021-03-26

## 2021-03-26 ENCOUNTER — OFFICE VISIT - HEALTHEAST (OUTPATIENT)
Dept: INTERNAL MEDICINE | Facility: CLINIC | Age: 71
End: 2021-03-26

## 2021-03-26 DIAGNOSIS — C67.9 MALIGNANT NEOPLASM OF URINARY BLADDER, UNSPECIFIED SITE (H): ICD-10-CM

## 2021-03-26 DIAGNOSIS — Z12.5 SCREENING PSA (PROSTATE SPECIFIC ANTIGEN): ICD-10-CM

## 2021-03-26 DIAGNOSIS — Z00.00 ROUTINE GENERAL MEDICAL EXAMINATION AT A HEALTH CARE FACILITY: ICD-10-CM

## 2021-03-26 DIAGNOSIS — E78.00 PURE HYPERCHOLESTEROLEMIA: ICD-10-CM

## 2021-03-26 DIAGNOSIS — I25.10 ATHEROSCLEROSIS OF NATIVE CORONARY ARTERY OF NATIVE HEART WITHOUT ANGINA PECTORIS: ICD-10-CM

## 2021-03-26 DIAGNOSIS — M17.11 PRIMARY OSTEOARTHRITIS OF RIGHT KNEE: ICD-10-CM

## 2021-03-26 DIAGNOSIS — M47.817 LUMBOSACRAL SPONDYLOSIS WITHOUT MYELOPATHY: ICD-10-CM

## 2021-03-26 DIAGNOSIS — I10 ESSENTIAL HYPERTENSION, BENIGN: ICD-10-CM

## 2021-03-26 DIAGNOSIS — M41.20 SCOLIOSIS (AND KYPHOSCOLIOSIS), IDIOPATHIC: ICD-10-CM

## 2021-03-26 DIAGNOSIS — I78.1 NON-NEOPLASTIC NEVUS: ICD-10-CM

## 2021-03-26 LAB
ALBUMIN SERPL-MCNC: 4.3 G/DL (ref 3.5–5)
ALP SERPL-CCNC: 76 U/L (ref 45–120)
ALT SERPL W P-5'-P-CCNC: 31 U/L (ref 0–45)
ANION GAP SERPL CALCULATED.3IONS-SCNC: 10 MMOL/L (ref 5–18)
AST SERPL W P-5'-P-CCNC: 27 U/L (ref 0–40)
BILIRUB SERPL-MCNC: 0.9 MG/DL (ref 0–1)
BUN SERPL-MCNC: 12 MG/DL (ref 8–28)
CALCIUM SERPL-MCNC: 9.1 MG/DL (ref 8.5–10.5)
CHLORIDE BLD-SCNC: 101 MMOL/L (ref 98–107)
CHOLEST SERPL-MCNC: 136 MG/DL
CO2 SERPL-SCNC: 27 MMOL/L (ref 22–31)
CREAT SERPL-MCNC: 0.81 MG/DL (ref 0.7–1.3)
ERYTHROCYTE [DISTWIDTH] IN BLOOD BY AUTOMATED COUNT: 12.1 % (ref 11–14.5)
FASTING STATUS PATIENT QL REPORTED: YES
GFR SERPL CREATININE-BSD FRML MDRD: >60 ML/MIN/1.73M2
GLUCOSE BLD-MCNC: 87 MG/DL (ref 70–125)
HCT VFR BLD AUTO: 43.3 % (ref 40–54)
HDLC SERPL-MCNC: 45 MG/DL
HGB BLD-MCNC: 15 G/DL (ref 14–18)
LDLC SERPL CALC-MCNC: 73 MG/DL
MCH RBC QN AUTO: 32.1 PG (ref 27–34)
MCHC RBC AUTO-ENTMCNC: 34.6 G/DL (ref 32–36)
MCV RBC AUTO: 93 FL (ref 80–100)
PLATELET # BLD AUTO: 206 THOU/UL (ref 140–440)
PMV BLD AUTO: 9 FL (ref 7–10)
POTASSIUM BLD-SCNC: 4.2 MMOL/L (ref 3.5–5)
PROT SERPL-MCNC: 6.8 G/DL (ref 6–8)
PSA SERPL-MCNC: 0.3 NG/ML (ref 0–6.5)
RBC # BLD AUTO: 4.67 MILL/UL (ref 4.4–6.2)
SODIUM SERPL-SCNC: 138 MMOL/L (ref 136–145)
TRIGL SERPL-MCNC: 92 MG/DL
TSH SERPL DL<=0.005 MIU/L-ACNC: 2.01 UIU/ML (ref 0.3–5)
WBC: 8.6 THOU/UL (ref 4–11)

## 2021-03-26 RX ORDER — HYDROCHLOROTHIAZIDE 25 MG/1
25 TABLET ORAL DAILY
Qty: 90 TABLET | Refills: 3 | Status: SHIPPED | OUTPATIENT
Start: 2021-03-26 | End: 2022-03-02

## 2021-03-26 RX ORDER — AMLODIPINE BESYLATE 5 MG/1
5 TABLET ORAL DAILY
Qty: 90 TABLET | Refills: 3 | Status: SHIPPED | OUTPATIENT
Start: 2021-03-26 | End: 2022-03-02

## 2021-03-26 ASSESSMENT — MIFFLIN-ST. JEOR: SCORE: 1661.07

## 2021-04-19 ENCOUNTER — RECORDS - HEALTHEAST (OUTPATIENT)
Dept: ADMINISTRATIVE | Facility: OTHER | Age: 71
End: 2021-04-19

## 2021-04-21 ENCOUNTER — RECORDS - HEALTHEAST (OUTPATIENT)
Dept: ADMINISTRATIVE | Facility: OTHER | Age: 71
End: 2021-04-21

## 2021-05-28 NOTE — TELEPHONE ENCOUNTER
Refill Approved    Rx renewed per Medication Renewal Policy. Medication was last renewed on 4/23/18. 7/31/18    Ruby Cochran, [facility] Triage/Med Refill 4/26/2019     Requested Prescriptions   Pending Prescriptions Disp Refills     atorvastatin (LIPITOR) 80 MG tablet [Pharmacy Med Name: ATORVASTATIN 80MG TABLETS] 45 tablet 0     Sig: TAKE 1/2 TABLET(40 MG) BY MOUTH EVERY EVENING       Statins Refill Protocol (Hmg CoA Reductase Inhibitors) Passed - 4/24/2019  8:05 AM        Passed - PCP or prescribing provider visit in past 12 months      Last office visit with prescriber/PCP: 1/24/2019 Avery Boothe MD OR same dept: 1/29/2019 Carie Payton MD OR same specialty: 1/29/2019 Carie Payton MD  Last physical: 5/23/2016 Last MTM visit: Visit date not found   Next visit within 3 mo: Visit date not found  Next physical within 3 mo: Visit date not found  Prescriber OR PCP: Avery Boothe MD  Last diagnosis associated with med order: 1. Pure hypercholesterolemia  - atorvastatin (LIPITOR) 80 MG tablet [Pharmacy Med Name: ATORVASTATIN 80MG TABLETS]; TAKE 1/2 TABLET(40 MG) BY MOUTH EVERY EVENING  Dispense: 45 tablet; Refill: 0    2. Benign Essential Hypertension  - hydroCHLOROthiazide (HYDRODIURIL) 12.5 MG tablet [Pharmacy Med Name: HYDROCHLOROTHIAZIDE 12.5MG TABLETS]; TAKE 1 TABLET(12.5 MG) BY MOUTH DAILY IN THE MORNING  Dispense: 90 tablet; Refill: 0    If protocol passes may refill for 12 months if within 3 months of last provider visit (or a total of 15 months).             hydroCHLOROthiazide (HYDRODIURIL) 12.5 MG tablet [Pharmacy Med Name: HYDROCHLOROTHIAZIDE 12.5MG TABLETS] 90 tablet 0     Sig: TAKE 1 TABLET(12.5 MG) BY MOUTH DAILY IN THE MORNING       Diuretics/Combination Diuretics Refill Protocol  Passed - 4/24/2019  8:05 AM        Passed - Visit with PCP or prescribing provider visit in past 12 months     Last office visit with prescriber/PCP: 1/24/2019 Avery Boothe MD  OR same dept: 1/29/2019 Carie Payton MD OR same specialty: 1/29/2019 Carie Payton MD  Last physical: 5/23/2016 Last MTM visit: Visit date not found   Next visit within 3 mo: Visit date not found  Next physical within 3 mo: Visit date not found  Prescriber OR PCP: Avery Boothe MD  Last diagnosis associated with med order: 1. Pure hypercholesterolemia  - atorvastatin (LIPITOR) 80 MG tablet [Pharmacy Med Name: ATORVASTATIN 80MG TABLETS]; TAKE 1/2 TABLET(40 MG) BY MOUTH EVERY EVENING  Dispense: 45 tablet; Refill: 0    2. Benign Essential Hypertension  - hydroCHLOROthiazide (HYDRODIURIL) 12.5 MG tablet [Pharmacy Med Name: HYDROCHLOROTHIAZIDE 12.5MG TABLETS]; TAKE 1 TABLET(12.5 MG) BY MOUTH DAILY IN THE MORNING  Dispense: 90 tablet; Refill: 0    If protocol passes may refill for 12 months if within 3 months of last provider visit (or a total of 15 months).             Passed - Serum Potassium in past 12 months      Lab Results   Component Value Date    Potassium 4.2 11/14/2018             Passed - Serum Sodium in past 12 months      Lab Results   Component Value Date    Sodium 141 11/14/2018             Passed - Blood pressure on file in past 12 months     BP Readings from Last 1 Encounters:   01/29/19 138/76             Passed - Serum Creatinine in past 12 months      Creatinine   Date Value Ref Range Status   11/14/2018 0.82 0.70 - 1.30 mg/dL Final

## 2021-05-29 NOTE — PROGRESS NOTES
Assessment:  1.  Coronary artery disease, stable.  2.  Hypertension controlled.  3.  Hyperlipidemia, checking status.  4.  Lumbar osteoarthritis.  5.  Right knee osteoarthritis.  6.  Right lateral greater trochanteric bursitis.  7.  Multiple skin lesions, including seborrheic keratoses and various moles.  8.  Screen for diabetes.  9.  Hyperglycemia with last lab test last fall.   10.  Overweight.  11.  Family history of diabetes.    Plan: He will schedule fasting lab and will check fasting lipid, basic, hepatic profile and A1c.  He is not fasting this morning but promises to return here within the next 3 to 4 weeks for that fasting lab.  Continue current medication.  Continue regular exercise as he is doing.  Do recommend careful diet and have discussed with him previously weight reduction.  Continue use of ibuprofen as needed at this time for the management of the low back pain with lumbar osteoarthritis as long as his kidney and liver function are normal.  He could have another steroid injection in the trochanteric bursa later in the year if he needed to.  At this time it is okay to observe the various skin lesions on the face and back as none of them look to be malignant, if any of them bother him explained we certainly could consider excision of a given spot.  Recommend he stay on the aspirin related to his history of the coronary artery disease.    Subjective: 68-year-old male presenting for follow-up on multiple issues as above.  Regarding the heart he denies any chest pain or trouble breathing or leg swelling.  Regarding the hypertension no headaches or dizziness.  Regarding lipids no diarrhea or constipation muscle aching or muscle weakness.  He does have the intermittent recurrent low back pain related to his known lumbar osteoarthritis.  He will take 3 ibuprofen in the morning at times for that and occasionally take 2 ibuprofen later in the day.  He knows that he had the abnormal MRI of the right knee back  in 2014, and he is aware that Ortho recommended he consider partial knee replacement, but because his pain is only intermittent in that right knee he prefers to not pursue surgery at this time and that is very reasonable.  He asks about multiple skin lesions including areas on the right forehead and the left side of the neck and at the bottom of the right ear, he notes that all of the lesions have been there for at least several years.  None of them are recently new.  None of them have changed significantly recently.  He notes his wife wanted him to get one checked on his back as well.  He did have the borderline blood sugar last fall, that was not fasting.  He is not fasting this morning hoping that he did not have to be fasting.  He does have significant family history of diabetes.  Patient Active Problem List   Diagnosis     Male Erectile Disorder     Scoliosis     Primary Osteoarthritis Of The Lumbar Vertebrae     Bladder Polyps     Essential Hypercholesterolemia     Benign Essential Hypertension     Coronary Artery Disease     Acute Myocardial Infarction     Carpal Tunnel Syndrome     Chronic Reflux Esophagitis     Joint Pain, Localized In The Knee     Tubular adenoma of colon     Osteoarthritis of right knee     Trochanteric bursitis of both hips     Past Medical History:   Diagnosis Date     Bladder cancer (H)     see [facility] notes     Carpal tunnel syndrome      Cataract      Coronary artery disease      GERD (gastroesophageal reflux disease)      Hypercholesteremia      Hypertension      Lower back pain      Scoliosis      No Known Allergies  Current Outpatient Medications   Medication Sig Dispense Refill     amLODIPine (NORVASC) 5 MG tablet Take 1 tablet (5 mg total) by mouth daily. 90 tablet 3     aspirin 81 MG tablet Take 81 mg by mouth daily.       atorvastatin (LIPITOR) 80 MG tablet TAKE 1/2 TABLET(40 MG) BY MOUTH EVERY EVENING 45 tablet 1     cholecalciferol, vitamin D3, (VITAMIN D3) 2,000 unit  "capsule Take 2,000 Units by mouth daily.       hydroCHLOROthiazide (HYDRODIURIL) 12.5 MG tablet TAKE 1 TABLET(12.5 MG) BY MOUTH DAILY IN THE MORNING 90 tablet 1     lisinopril (PRINIVIL,ZESTRIL) 20 MG tablet Take 1 tablet (20 mg total) by mouth 2 (two) times a day. 180 tablet 2     multivitamin with minerals (THERA-M) 9 mg iron-400 mcg Tab tablet Take 1 tablet by mouth daily.       omeprazole (PRILOSEC) 20 MG capsule Take 20 mg by mouth daily before breakfast.       sildenafil (VIAGRA) 50 MG tablet Take 1 tablet (50 mg total) by mouth daily as needed for erectile dysfunction. 16 tablet 5     No current facility-administered medications for this visit.      All other review of systems negative for any other changes.    Objective:/80   Pulse 83   Ht 5' 9.5\" (1.765 m)   Wt 210 lb (95.3 kg)   SpO2 96%   BMI 30.57 kg/m    HEENT exam shows no acute change.  Neck supple without adenopathy or thyromegaly.  Lungs clear.  Heart regular rate and rhythm without murmur.  Abdomen shows no masses tenderness or paraspinal megaly.  No pedal edema.  Multiple nevi including 2 on the left side of the neck, several near the right pinna on the lower aspect on the face, a seborrheic keratosis on the right forehead. On the back he has several seborrheic keratoses.  None of those look to be malignant.  The 2 on the left side of the neck and near the right ear do have dark pigmentation but do have smooth circular edges.  He is alert with clear speech.  He ambulates independently.  He does not need to use any cane etc.  At this point today he does not appear to be favoring the right knee at all.  "

## 2021-05-30 VITALS — WEIGHT: 215 LBS | HEIGHT: 68 IN | BODY MASS INDEX: 32.58 KG/M2

## 2021-05-30 NOTE — TELEPHONE ENCOUNTER
Refill Approved    Rx renewed per Medication Renewal Policy. Medication was last renewed on 4/26/19.    Ruby Cochran, [facility] Triage/Med Refill 7/24/2019     Requested Prescriptions   Pending Prescriptions Disp Refills     hydroCHLOROthiazide (HYDRODIURIL) 12.5 MG tablet [Pharmacy Med Name: HYDROCHLOROTHIAZIDE 12.5MG TABLETS] 90 tablet 0     Sig: TAKE 1 TABLET(12.5 MG) BY MOUTH DAILY IN THE MORNING       Diuretics/Combination Diuretics Refill Protocol  Passed - 7/23/2019  2:07 PM        Passed - Visit with PCP or prescribing provider visit in past 12 months     Last office visit with prescriber/PCP: 5/23/2019 Avery Boothe MD OR same dept: 5/23/2019 Avery Boothe MD OR same specialty: 5/23/2019 Avery Boothe MD  Last physical: 5/23/2016 Last MTM visit: Visit date not found   Next visit within 3 mo: Visit date not found  Next physical within 3 mo: Visit date not found  Prescriber OR PCP: Avery Boothe MD  Last diagnosis associated with med order: 1. Benign Essential Hypertension  - hydroCHLOROthiazide (HYDRODIURIL) 12.5 MG tablet [Pharmacy Med Name: HYDROCHLOROTHIAZIDE 12.5MG TABLETS]; TAKE 1 TABLET(12.5 MG) BY MOUTH DAILY IN THE MORNING  Dispense: 90 tablet; Refill: 0    2. Pure hypercholesterolemia  - atorvastatin (LIPITOR) 80 MG tablet [Pharmacy Med Name: ATORVASTATIN 80MG TABLETS]; TAKE 1/2 TABLET(40 MG) BY MOUTH EVERY EVENING  Dispense: 45 tablet; Refill: 0    If protocol passes may refill for 12 months if within 3 months of last provider visit (or a total of 15 months).             Passed - Serum Potassium in past 12 months      Lab Results   Component Value Date    Potassium 4.2 06/27/2019             Passed - Serum Sodium in past 12 months      Lab Results   Component Value Date    Sodium 139 06/27/2019             Passed - Blood pressure on file in past 12 months     BP Readings from Last 1 Encounters:   05/23/19 130/80             Passed - Serum Creatinine in past 12 months       Creatinine   Date Value Ref Range Status   06/27/2019 0.83 0.70 - 1.30 mg/dL Final             atorvastatin (LIPITOR) 80 MG tablet [Pharmacy Med Name: ATORVASTATIN 80MG TABLETS] 45 tablet 0     Sig: TAKE 1/2 TABLET(40 MG) BY MOUTH EVERY EVENING       Statins Refill Protocol (Hmg CoA Reductase Inhibitors) Passed - 7/23/2019  2:07 PM        Passed - PCP or prescribing provider visit in past 12 months      Last office visit with prescriber/PCP: 5/23/2019 Avery Boothe MD OR same dept: 5/23/2019 Avery Boothe MD OR same specialty: 5/23/2019 Avery Boothe MD  Last physical: 5/23/2016 Last MTM visit: Visit date not found   Next visit within 3 mo: Visit date not found  Next physical within 3 mo: Visit date not found  Prescriber OR PCP: Avery Boothe MD  Last diagnosis associated with med order: 1. Benign Essential Hypertension  - hydroCHLOROthiazide (HYDRODIURIL) 12.5 MG tablet [Pharmacy Med Name: HYDROCHLOROTHIAZIDE 12.5MG TABLETS]; TAKE 1 TABLET(12.5 MG) BY MOUTH DAILY IN THE MORNING  Dispense: 90 tablet; Refill: 0    2. Pure hypercholesterolemia  - atorvastatin (LIPITOR) 80 MG tablet [Pharmacy Med Name: ATORVASTATIN 80MG TABLETS]; TAKE 1/2 TABLET(40 MG) BY MOUTH EVERY EVENING  Dispense: 45 tablet; Refill: 0    If protocol passes may refill for 12 months if within 3 months of last provider visit (or a total of 15 months).

## 2021-05-31 ENCOUNTER — RECORDS - HEALTHEAST (OUTPATIENT)
Dept: ADMINISTRATIVE | Facility: CLINIC | Age: 71
End: 2021-05-31

## 2021-05-31 VITALS — HEIGHT: 70 IN | BODY MASS INDEX: 30.64 KG/M2 | WEIGHT: 214 LBS

## 2021-05-31 VITALS — WEIGHT: 210 LBS | HEIGHT: 70 IN | BODY MASS INDEX: 30.06 KG/M2

## 2021-06-01 VITALS — BODY MASS INDEX: 30.35 KG/M2 | WEIGHT: 212 LBS | HEIGHT: 70 IN

## 2021-06-02 VITALS — BODY MASS INDEX: 30.57 KG/M2 | WEIGHT: 210 LBS

## 2021-06-02 VITALS — HEIGHT: 70 IN | BODY MASS INDEX: 30.42 KG/M2

## 2021-06-02 VITALS — WEIGHT: 209 LBS | BODY MASS INDEX: 30.42 KG/M2

## 2021-06-02 VITALS — HEIGHT: 70 IN | BODY MASS INDEX: 30.49 KG/M2 | WEIGHT: 213 LBS

## 2021-06-02 VITALS — WEIGHT: 210 LBS | BODY MASS INDEX: 30.57 KG/M2

## 2021-06-02 VITALS — WEIGHT: 211.7 LBS | BODY MASS INDEX: 30.81 KG/M2

## 2021-06-03 VITALS — HEIGHT: 70 IN | WEIGHT: 210 LBS | BODY MASS INDEX: 30.06 KG/M2

## 2021-06-04 VITALS
SYSTOLIC BLOOD PRESSURE: 136 MMHG | DIASTOLIC BLOOD PRESSURE: 76 MMHG | HEIGHT: 70 IN | HEART RATE: 85 BPM | WEIGHT: 211 LBS | OXYGEN SATURATION: 96 % | BODY MASS INDEX: 30.21 KG/M2

## 2021-06-04 VITALS — SYSTOLIC BLOOD PRESSURE: 121 MMHG | WEIGHT: 211 LBS | BODY MASS INDEX: 30.71 KG/M2 | DIASTOLIC BLOOD PRESSURE: 76 MMHG

## 2021-06-04 VITALS
HEIGHT: 70 IN | SYSTOLIC BLOOD PRESSURE: 126 MMHG | WEIGHT: 215 LBS | DIASTOLIC BLOOD PRESSURE: 74 MMHG | HEART RATE: 80 BPM | RESPIRATION RATE: 16 BRPM | BODY MASS INDEX: 30.78 KG/M2

## 2021-06-04 VITALS
WEIGHT: 209.56 LBS | OXYGEN SATURATION: 95 % | DIASTOLIC BLOOD PRESSURE: 90 MMHG | RESPIRATION RATE: 16 BRPM | HEART RATE: 82 BPM | SYSTOLIC BLOOD PRESSURE: 140 MMHG | BODY MASS INDEX: 30.5 KG/M2 | TEMPERATURE: 98.1 F

## 2021-06-04 NOTE — PROGRESS NOTES
Assessment:  1.  Coronary artery disease, asymptomatic.  2.  Hypertension controlled.  3.  Hyperlipidemia, checking status.  4.  Lumbar osteoarthritis.  5.  Bilateral carpal tunnel syndrome.    Plan: Check hepatic and basic profiles and direct LDL.  Refer to hand surgeon for consideration of carpal tunnel surgery given that he is developing weakness in the hands and he has known diagnosis from years ago.  At minimum follow-up for med check within 6 months.  Okay to get shingles shot if his insurance covers it and he wishes to pursue it etc.    Subjective: 69-year-old male presenting for follow-up on multiple issues as above.  Regarding the heart he denies any chest pain or trouble breathing or leg swelling.  Regarding hypertension no headaches or dizziness.  Regarding lipids no diarrhea or constipation.  He notes he has the right knee osteoarthritis and he will get intermittent pain with that but has continued to not want to pursue any joint replacement there.  He has the known bilateral carpal tunnel syndrome and did have EMG bilateral years ago that showed carpal tunnel and then 2-1/2 years ago in May 2017, neurologist confirmed carpal tunnel without any sign of radiculopathy from the neck at that time.  He notes that now he is developing more trouble with thumb weakness.  It is worse on the left side than the right side.  He does have daily pain with his lower back.  He does change positions as needed etc.  Patient Active Problem List   Diagnosis     Male Erectile Disorder     Scoliosis     Primary Osteoarthritis Of The Lumbar Vertebrae     Bladder Polyps     Essential Hypercholesterolemia     Benign Essential Hypertension     Coronary Artery Disease     Acute Myocardial Infarction     Carpal Tunnel Syndrome     Chronic Reflux Esophagitis     Joint Pain, Localized In The Knee     Tubular adenoma of colon     Osteoarthritis of right knee     Trochanteric bursitis of both hips     Past Medical History:   Diagnosis  "Date     Bladder cancer (H)     see [facility] notes     Carpal tunnel syndrome      Cataract      Coronary artery disease      GERD (gastroesophageal reflux disease)      Hypercholesteremia      Hypertension      Lower back pain      Scoliosis      No Known Allergies  Current Outpatient Medications   Medication Sig Dispense Refill     amLODIPine (NORVASC) 5 MG tablet Take 1 tablet (5 mg total) by mouth daily. 90 tablet 3     aspirin 81 MG tablet Take 81 mg by mouth daily.       atorvastatin (LIPITOR) 80 MG tablet TAKE 1/2 TABLET(40 MG) BY MOUTH EVERY EVENING 45 tablet 2     hydroCHLOROthiazide (HYDRODIURIL) 12.5 MG tablet TAKE 1 TABLET(12.5 MG) BY MOUTH DAILY IN THE MORNING 90 tablet 2     lisinopril (PRINIVIL,ZESTRIL) 20 MG tablet Take 1 tablet (20 mg total) by mouth 2 (two) times a day. 180 tablet 0     multivitamin with minerals (THERA-M) 9 mg iron-400 mcg Tab tablet Take 1 tablet by mouth daily.       omeprazole (PRILOSEC) 20 MG capsule Take 20 mg by mouth daily before breakfast.       sildenafil (VIAGRA) 50 MG tablet Take 1 tablet (50 mg total) by mouth daily as needed for erectile dysfunction. 16 tablet 5     No current facility-administered medications for this visit.      All other review of systems are negative.    Objective:/76   Pulse 85   Ht 5' 9.5\" (1.765 m)   Wt 211 lb (95.7 kg)   SpO2 96%   BMI 30.71 kg/m    HEENT is negative.  Neck supple.  Lungs clear.  Heart regular rate and rhythm without murmur.  Abdomen shows no masses tenderness or hepatosplenomegaly.  No pitting edema at the ankle.  He does ambulate independently.  "

## 2021-06-05 VITALS — BODY MASS INDEX: 30.66 KG/M2 | HEIGHT: 69 IN | WEIGHT: 207 LBS

## 2021-06-05 VITALS
HEIGHT: 70 IN | SYSTOLIC BLOOD PRESSURE: 110 MMHG | HEART RATE: 60 BPM | WEIGHT: 207 LBS | DIASTOLIC BLOOD PRESSURE: 68 MMHG | RESPIRATION RATE: 14 BRPM | BODY MASS INDEX: 29.63 KG/M2

## 2021-06-05 VITALS
BODY MASS INDEX: 31.16 KG/M2 | SYSTOLIC BLOOD PRESSURE: 188 MMHG | TEMPERATURE: 98.4 F | DIASTOLIC BLOOD PRESSURE: 98 MMHG | WEIGHT: 205.6 LBS | OXYGEN SATURATION: 96 % | HEIGHT: 68 IN | HEART RATE: 52 BPM

## 2021-06-05 VITALS
HEART RATE: 74 BPM | WEIGHT: 209 LBS | BODY MASS INDEX: 30.96 KG/M2 | DIASTOLIC BLOOD PRESSURE: 90 MMHG | HEIGHT: 69 IN | SYSTOLIC BLOOD PRESSURE: 148 MMHG | OXYGEN SATURATION: 96 %

## 2021-06-05 NOTE — PROGRESS NOTES
Chief Complaint   Patient presents with     Wheezing     Pt c/o wheezing, coughing x 1.5 weeks, keeping him awake at night.        HPI: 69-year-old male with a past history of hypertension, heart disease and reflux esophagitis presents today with 1-1/2 weeks of wheezing cough and congestion.  He is also had mild sputum production.  This is in the context of no other people in his house ill.    ROS: No chest pain no shortness of breath.  No rashes.    SH:    reports that he has quit smoking. His smoking use included cigars. He has never used smokeless tobacco. He reports current alcohol use of about 1.0 standard drinks of alcohol per week. He reports that he does not use drugs.      FH: The Patient's family history includes Crohn's disease in his sister; Diabetes in his brother; Heart disease in his father and mother; Stroke in his mother.     Meds: Josafat has a current medication list which includes the following prescription(s): amlodipine, aspirin, atorvastatin, hydrochlorothiazide, lisinopril, multivitamin with minerals, omeprazole, sildenafil, and azithromycin.    O:  /90   Pulse 82   Temp 98.1  F (36.7  C)   Resp 16   Wt 209 lb 9 oz (95.1 kg)   SpO2 95%   BMI 30.50 kg/m     Constitutional:    --Vitals as above  --No acute distress  Eyes-  --Sclera noninjected  --Lids and conjunctiva normal  ENT-  --TMs clear  --Sclera noninjected  --Pharynx not erythematous  Neck-  --Neck supple    Lymph-  --No cervical lymphadenopathy  Lungs-  --wheezes bilaterally  Heart-  --Regular rate and rhythm  Skin-  --Pink and dry    Chest x-ray-focal infiltrates in the lower lobes bilaterally    A/P:   1. Community acquired pneumonia, unspecified laterality  The patient is increased risk due to his comorbidities.  Will treat aggressively with Rocephin, Zithromax, increase fluids and rest and return if not improving  - XR Chest 2 Views; Future  - cefTRIAXone injection 1 g (ROCEPHIN)  - azithromycin (ZITHROMAX Z-MARANDA) 250 MG  tablet; 2 tabs today then 1 daily x 4 days  Dispense: 6 tablet; Refill: 0

## 2021-06-05 NOTE — TELEPHONE ENCOUNTER
Cough x 1 week, wheezing, not sleeping.    Coughing all the time. No fever, HA or chest pain.    Has appointment scheduled for tomorrow morning.    Caller agrees with care advice given. Agreed to call back if patient has additional symptoms or questions.      Rosalind Howard RN [facility] Triage Nurse Advisor    Reason for Disposition    Wheezing is present    Protocols used: COUGH-A-OH

## 2021-06-09 NOTE — PROGRESS NOTES
"Josafat Madera is a 69 y.o. male who is being evaluated via a billable telephone visit.      The patient has been notified of following:     \"This telephone visit will be conducted via a call between you and your physician/provider. We have found that certain health care needs can be provided without the need for a physical exam.  This service lets us provide the care you need with a short phone conversation.  If a prescription is necessary we can send it directly to your pharmacy.  If lab work is needed we can place an order for that and you can then stop by our lab to have the test done at a later time.    Telephone visits are billed at different rates depending on your insurance coverage. During this emergency period, for some insurers they may be billed the same as an in-person visit.  Please reach out to your insurance provider with any questions.    If during the course of the call the physician/provider feels a telephone visit is not appropriate, you will not be charged for this service.\"    Patient has given verbal consent to a Telephone visit? Yes    What phone number would you like to be contacted at? [phone]    Patient would like to receive their AVS by AVS Preference: nNeka.    Additional provider notes:     Assessment/Plan:  1. Atherosclerosis of native coronary artery of native heart without angina pectoris  Lipid Cascade   2. Benign Essential Hypertension  Basic Metabolic Panel   3. Essential Hypercholesterolemia  Lipid Cascade    Hepatic Profile    CK Total   4. Chronic Reflux Esophagitis     5. Chronic pain of right thumb     6. Bilateral hip pain     7. Sebaceous cyst       Plan: He will obtain the fasting lab above in the next several weeks.  He can try holding the atorvastatin after that lab is done to see if it makes any difference in any of his muscle aching or not.  I explained that I could refer him to orthopedics to the hand surgeon for possible steroid injection in the right " metacarpophalangeal thumb joint where he very likely has significant osteoarthritis with his symptoms.  But he prefers to hold off at this time.  He certainly may well have trochanteric bursitis given his past history of that and his current symptoms and he can either get referred to orthopedics or see Dr. Payton for steroid injection.  He is seeing urology anyhow in the next month or so and is intermittent epididymal pain can be rechecked there at that time since he does not have any pain right now in that area.  Regarding the skin lumps, his description is very typical for sebaceous cyst, and I recommend that he not be trying to squeeze them himself because of the risk of inducing infection or inflammation etc.  He needs to follow-up at least every 6 months regarding his issues as above and we discussed the alternatives for primary care here and I explained that I am retiring in early August and he understands and agrees.    Subjective: 69-year-old male presenting for follow-up and discussion on multiple issues.  Regarding the heart he denies any chest pain trouble breathing or leg swelling.  Regarding hypertension no headaches or dizziness.  Regarding lipids no diarrhea constipation.  He does have some muscle aching at times.  He notes that in his right thumb specifically he gets weakness that is related to having pain at the base of the thumb he notes in the second joint from the thumbnail.  He notes he has pain in the bilateral hips that happens after he walks for several blocks and he has to stop and rest before he goes on.  He previously had injection by Dr. Payton and chart notes as that was in January 2019.  He notes that he has some lumps at times in his face and sometimes he can squeeze out a white material that is smelly.  Then he notes that some of those areas get itching afterwards.  Patient Active Problem List   Diagnosis     Male Erectile Disorder     Scoliosis     Primary Osteoarthritis Of The  Lumbar Vertebrae     Bladder Polyps     Essential Hypercholesterolemia     Benign Essential Hypertension     Coronary Artery Disease     Acute Myocardial Infarction     Carpal Tunnel Syndrome     Chronic Reflux Esophagitis     Joint Pain, Localized In The Knee     Tubular adenoma of colon     Osteoarthritis of right knee     Trochanteric bursitis of both hips     Past Medical History:   Diagnosis Date     Bladder cancer (H)     see [facility] notes     Carpal tunnel syndrome      Cataract      Coronary artery disease      GERD (gastroesophageal reflux disease)      Hypercholesteremia      Hypertension      Lower back pain      Scoliosis      No Known Allergies  Current Outpatient Medications   Medication Sig Dispense Refill     amLODIPine (NORVASC) 5 MG tablet Take 1 tablet (5 mg total) by mouth daily. 90 tablet 3     aspirin 81 MG tablet Take 81 mg by mouth daily.       atorvastatin (LIPITOR) 80 MG tablet TAKE 1/2 TABLET(40 MG) BY MOUTH EVERY EVENING 45 tablet 2     hydroCHLOROthiazide (HYDRODIURIL) 12.5 MG tablet TAKE 1 TABLET(12.5 MG) BY MOUTH DAILY IN THE MORNING 90 tablet 2     lisinopriL (PRINIVIL,ZESTRIL) 20 MG tablet TAKE 1 TABLET(20 MG) BY MOUTH TWICE DAILY 180 tablet 2     multivitamin with minerals (THERA-M) 9 mg iron-400 mcg Tab tablet Take 1 tablet by mouth daily.       omeprazole (PRILOSEC) 20 MG capsule Take 20 mg by mouth daily before breakfast.       sildenafil (VIAGRA) 50 MG tablet Take 1 tablet (50 mg total) by mouth daily as needed for erectile dysfunction. 16 tablet 5     No current facility-administered medications for this visit.      He does not smoke.  Little alcohol.  All other review of systems are negative.    Objective:/76   Wt 211 lb (95.7 kg)   BMI 30.71 kg/m          Phone call duration:  26 minutes    Avery Boothe MD

## 2021-06-09 NOTE — TELEPHONE ENCOUNTER
Refill Approved    Rx renewed per Medication Renewal Policy. Medication was last renewed on 7/24/19.    Ruby Cochran, [facility] Triage/Med Refill 7/13/2020     Requested Prescriptions   Pending Prescriptions Disp Refills     hydroCHLOROthiazide (HYDRODIURIL) 12.5 MG tablet [Pharmacy Med Name: HYDROCHLOROTHIAZIDE 12.5MG TABLETS] 90 tablet 2     Sig: TAKE 1 TABLET(12.5 MG) BY MOUTH DAILY IN THE MORNING       Diuretics/Combination Diuretics Refill Protocol  Passed - 7/12/2020  2:35 PM        Passed - Visit with PCP or prescribing provider visit in past 12 months     Last office visit with prescriber/PCP: 12/16/2019 Avery Boothe MD OR same dept: 1/15/2020 Estevan Holley MD OR same specialty: 1/15/2020 Estevan Holley MD  Last physical: Visit date not found Last MTM visit: Visit date not found   Next visit within 3 mo: Visit date not found  Next physical within 3 mo: Visit date not found  Prescriber OR PCP: Avery Boothe MD  Last diagnosis associated with med order: 1. Benign Essential Hypertension  - hydroCHLOROthiazide (HYDRODIURIL) 12.5 MG tablet [Pharmacy Med Name: HYDROCHLOROTHIAZIDE 12.5MG TABLETS]; TAKE 1 TABLET(12.5 MG) BY MOUTH DAILY IN THE MORNING  Dispense: 90 tablet; Refill: 2    2. Pure hypercholesterolemia  - atorvastatin (LIPITOR) 80 MG tablet [Pharmacy Med Name: ATORVASTATIN 80MG TABLETS]; TAKE 1/2 TABLET(40 MG) BY MOUTH EVERY EVENING  Dispense: 45 tablet; Refill: 2    If protocol passes may refill for 12 months if within 3 months of last provider visit (or a total of 15 months).             Passed - Serum Potassium in past 12 months      Lab Results   Component Value Date    Potassium 4.1 12/16/2019             Passed - Serum Sodium in past 12 months      Lab Results   Component Value Date    Sodium 140 12/16/2019             Passed - Blood pressure on file in past 12 months     BP Readings from Last 1 Encounters:   06/22/20 121/76             Passed - Serum Creatinine in  past 12 months      Creatinine   Date Value Ref Range Status   12/16/2019 0.98 0.70 - 1.30 mg/dL Final                atorvastatin (LIPITOR) 80 MG tablet [Pharmacy Med Name: ATORVASTATIN 80MG TABLETS] 45 tablet 2     Sig: TAKE 1/2 TABLET(40 MG) BY MOUTH EVERY EVENING       Statins Refill Protocol (Hmg CoA Reductase Inhibitors) Passed - 7/12/2020  2:35 PM        Passed - PCP or prescribing provider visit in past 12 months      Last office visit with prescriber/PCP: 12/16/2019 Avery Boothe MD OR same dept: 1/15/2020 Estevan Holley MD OR same specialty: 1/15/2020 Estevan Holley MD  Last physical: Visit date not found Last MTM visit: Visit date not found   Next visit within 3 mo: Visit date not found  Next physical within 3 mo: Visit date not found  Prescriber OR PCP: Avery Boothe MD  Last diagnosis associated with med order: 1. Benign Essential Hypertension  - hydroCHLOROthiazide (HYDRODIURIL) 12.5 MG tablet [Pharmacy Med Name: HYDROCHLOROTHIAZIDE 12.5MG TABLETS]; TAKE 1 TABLET(12.5 MG) BY MOUTH DAILY IN THE MORNING  Dispense: 90 tablet; Refill: 2    2. Pure hypercholesterolemia  - atorvastatin (LIPITOR) 80 MG tablet [Pharmacy Med Name: ATORVASTATIN 80MG TABLETS]; TAKE 1/2 TABLET(40 MG) BY MOUTH EVERY EVENING  Dispense: 45 tablet; Refill: 2    If protocol passes may refill for 12 months if within 3 months of last provider visit (or a total of 15 months).

## 2021-06-10 NOTE — PROGRESS NOTES
Assessment:  1.  Left arm numbness, not typical for carpal tunnel syndrome.  2.  History of bilateral mild carpal tunnel syndrome in 2011.  3.  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    Plan: Can use ibuprofen as needed for now for the right thumb, but if that is not working adequate we can always refer him to hand surgery for consultation.  We will refer to neurology for consultation and left upper extremity EMG to further evaluate the arm numbness.  Further steps depend on those results.    Subjective: 66-year-old male presenting for evaluation of the above.  It was in January 2011 that he had bilateral upper extremity EMG done by Dr. Greg Amin that did show mild carpal tunnel syndrome.  The notes here lately he has been having increased numbness in the left arm that involves the hand and up to about the elbow level.  It is not confined to the hand itself.  He has had occasional pain in the neck that is on the left side but it is not constant.  No definite weakness.  No numbness elsewhere.  Past Medical History:   Diagnosis Date     Bladder cancer     see [facility] notes     Carpal tunnel syndrome      Cataract      Coronary artery disease      GERD (gastroesophageal reflux disease)      Hypercholesteremia      Hypertension      Lower back pain      Scoliosis      Current Outpatient Prescriptions   Medication Sig Dispense Refill     aspirin 81 MG tablet Take 81 mg by mouth daily.       atorvastatin (LIPITOR) 80 MG tablet Take 0.5 tablets (40 mg total) by mouth every evening. 90 tablet 0     atorvastatin (LIPITOR) 80 MG tablet TAKE HALF TABLET (40 MG) EVERY EVENING. 45 tablet 2     clopidogrel (PLAVIX) 75 mg tablet Take 1 tablet (75 mg total) by mouth daily. 30 tablet 7     hydroCHLOROthiazide (HYDRODIURIL) 12.5 MG tablet TAKE 1 TABLET(12.5 MG) BY MOUTH DAILY IN THE MORNING 90 tablet 2     lisinopril (PRINIVIL,ZESTRIL) 20 MG tablet Take 1 tablet (20 mg total) by mouth 2 (two) times  "a day. 180 tablet 3     multivitamin with minerals (THERA-M) 9 mg iron-400 mcg Tab tablet Take 1 tablet by mouth daily.       omeprazole (PRILOSEC OTC) 20 MG tablet Take 20 mg by mouth daily.        sildenafil (VIAGRA) 50 MG tablet Take 1 tablet (50 mg total) by mouth daily as needed for erectile dysfunction. 16 tablet 5     prasugrel (EFFIENT) 10 mg Tab tablet Take 1 tablet (10 mg total) by mouth daily. 90 tablet 4     No current facility-administered medications for this visit.      No Known Allergies  When he did try taking ibuprofen the other day he did have help with his right thumb pain.    Objective:/72  Pulse 82  Ht 5' 8\" (1.727 m)  Wt 215 lb (97.5 kg)  BMI 32.69 kg/m2  Right arm is unremarkable but there is some tenderness at the MCP joint of the right thumb.  No instability seen.   strength is good.  In the left arm he does have sensation to light touch in the hand but notes that he just feels numb in the hand and forearm.  Radial pulses normal.  Normal  strength.  No hypo-thenar or thenar atrophy.  Remainder of exam does not show any acute change.  "

## 2021-06-11 NOTE — TELEPHONE ENCOUNTER
Dr. Wilkes,  Please see patient concerns and request below.  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   Any new recommendations?  Thank you - Chey    ----------------------------------------  Reached out to patient to discuss his concerns.  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   Last graded stress test was done 12/21/2018.  Patient also requesting that Dr. Wilkes be updated he tires easily and is winded quickly with increased activity for the past 2 months. He denies chest pain, shortness of breath, lightheadedness, dizziness and syncope.  Informed patient an update will be sent and a return call will be made to patient with recommendations.  Patient verbalized understanding and has no further questions/concerns at this time.

## 2021-06-11 NOTE — TELEPHONE ENCOUNTER
----- Message from Shae Silva sent at 9/29/2020 11:31 AM CDT -----      Caller: Patient  Primary cardiologist: Dr. Wilkes    Detailed reason for call: Two things;  Patient has been feeling really tired lately and he feels like he tires very easily. And he is also requesting am exercise tolerance test. Please advise    Best phone number: [phone]  Best time to contact: today  Ok to leave a detailed message? yes  Device? No

## 2021-06-12 NOTE — TELEPHONE ENCOUNTER
----- Message from Shae Silva sent at 10/29/2020 11:17 AM CDT -----    Caller: Patient  Primary cardiologist: Dr. Wilkes    Detailed reason for call: Patient wants to know if it is still ok to use Viagra. Please advise    Best phone number: [phone]  Best time to contact: today  Ok to leave a detailed message? yes  Device? No    Additional Info:

## 2021-06-12 NOTE — PROGRESS NOTES
Assessment:  1.  Coronary artery disease, asymptomatic.  2.  Hypertension, borderline control.  3.  Hyperlipidemia, checking status.  4.  Chronic reflux esophagitis, controlled well.    Plan: Check hemoglobin, direct LDL, basic and hepatic profiles.  Refill medicines as needed over the next 6 months.  Next med check in 6 months.  Continue regular physical activity and healthy diet etc. if BP not better at next check would need further medication.    Subjective: 66-year-old male presenting for follow-up on the above.  Regarding the heart he denies any chest pain trouble breathing or leg swelling.  Regarding hypertension no headaches dizziness etc.  Regarding lipids no diarrhea constipation muscle aching or muscle weakness.  Regarding reflux he notes his symptoms have been very well controlled and is thinking about occasionally skipping the omeprazole.  Past Medical History:   Diagnosis Date     Bladder cancer     see [facility] notes     Carpal tunnel syndrome      Cataract      Coronary artery disease      GERD (gastroesophageal reflux disease)      Hypercholesteremia      Hypertension      Lower back pain      Scoliosis      Current Outpatient Prescriptions   Medication Sig Dispense Refill     aspirin 81 MG tablet Take 81 mg by mouth daily.       atorvastatin (LIPITOR) 80 MG tablet Take 0.5 tablets (40 mg total) by mouth every evening. 90 tablet 0     hydroCHLOROthiazide (HYDRODIURIL) 12.5 MG tablet TAKE 1 TABLET(12.5 MG) BY MOUTH DAILY IN THE MORNING 90 tablet 1     lisinopril (PRINIVIL,ZESTRIL) 20 MG tablet Take 1 tablet (20 mg total) by mouth 2 (two) times a day. 180 tablet 3     multivitamin with minerals (THERA-M) 9 mg iron-400 mcg Tab tablet Take 1 tablet by mouth daily.       omeprazole (PRILOSEC OTC) 20 MG tablet Take 20 mg by mouth daily.        sildenafil (VIAGRA) 50 MG tablet Take 1 tablet (50 mg total) by mouth daily as needed for erectile dysfunction. 16 tablet 5     No current facility-administered  "medications for this visit.      No Known Allergies  He notes he did stop taking the clopidogrel as he had taken it for 1 year and so he just takes the aspirin daily now.    Objective:/90  Pulse 84  Ht 5' 9.5\" (1.765 m)  Wt 210 lb (95.3 kg)  BMI 30.57 kg/m2  HEENT examination is negative.  Neck supple.  Lungs clear.  Heart regular rate and rhythm without murmur.  Abdomen shows no masses tenderness or hepatosplenomegaly.  No pedal edema.  "

## 2021-06-14 NOTE — TELEPHONE ENCOUNTER
Pt advised again that only 30 days is approved. He states that he's not coming back in until after he's gotten his COVID vaccine and he will just go without his medication.

## 2021-06-14 NOTE — TELEPHONE ENCOUNTER
Pt is aware that I RF med for 30 days pending full physical exam.  No exceptions.  Needs PE and labs.

## 2021-06-14 NOTE — TELEPHONE ENCOUNTER
Pt calling upset he still has not gotten 90 days supply sent to [facility].    He would like this fixed ASAP and would like a call confirming it was done.

## 2021-06-14 NOTE — TELEPHONE ENCOUNTER
Pt frustrated and would like more information regarding this matter. He had a med check 1/12/2021. BP was addressed and pt states he has been on hydrochlorothiazide for many years. Per note pt is due for a annual exam. Pt was under the impression that his apt earlier this month was to go over his medications. Pt does not feel comfortable coming to clinic until pandemic calms down. Pt would like to know what his apt from 1/12/2021 was for, if it is not allowing him to get his medication for 90 days. Please advise why apt is needed. Pt would like 90 day supply sent.

## 2021-06-14 NOTE — TELEPHONE ENCOUNTER
Hydrochlorothiazide was filled as 30 tablets. Pt asking for 90.     Last visit 1/12/2021. Pt told to f/u in 30 days for physical exam. This is why he only got 30 tablets. Pt notified of the reasoning behind his qty. He doesn't agree. Would like 90 days. He doesn't want to come back into the clinic until he gets his COVID19 vaccine.

## 2021-06-14 NOTE — TELEPHONE ENCOUNTER
Tell pt that I intentionally gave him a 30 day supply because I want him back within 30 days to check him and reassess.  I will not be giving him 90 days.

## 2021-06-14 NOTE — PROGRESS NOTES
S:  Patient presents for pain in his right ear for approximately 2 weeks in duration or slightly longer.  He notes that he has difficulty hearing out of the ear.  There is also pain associated with the ear.  He has had no fever, runny nose, cough or sore throat.    He has hypertension but fortunately has not had chest pain.  Recent echocardiogram from December was reassuring with a EF of 60 to 65%.  He requests refill of his hydrochlorothiazide.    Patient also notes loose stools for the past 2 to 3 months.  No melena or hematochezia.  No abdominal pains.    Medications: Lipitor, atenolol, aspirin, hydrochlorothiazide, lisinopril, metoprolol, omeprazole and sildenafil.    O:   Blood pressure 170/98, pulse 74 O2 sat 96% on room air  Constitutional:    --Vitals as above  --No acute distress  Eyes-  --Sclera noninjected  --Lids and conjunctiva normal  ENT-  --TMs-left normal right ear erythematous  --Sclera noninjected  --Pharynx not erythematous  Neck-  --Neck supple    Lymph-  --No cervical lymphadenopathy  Lungs-  --Clear to Auscultation  Heart-  --Regular rate and rhythm  Skin-  --Pink and dry    A:   Otitis media  Hypertension  Mild diarrhea    P:   Amoxil 875 twice daily 10 days  Discussed hypertension and patient should resume his hydrochlorthiazide and I refilled that today for 30 days.  Plan full physical exam within the next 30 days  Encourage exercise and weight loss and patient is aware that this would be beneficial  Discussed diarrhea and patient is going to try probiotics first and if that does not help would consider full work-up.  Colonoscopy is due in June.

## 2021-06-15 NOTE — TELEPHONE ENCOUNTER
[employer] message 2/2/21 advised patient try Flonase and if that didn't help, to contact Dr Plascencia again.

## 2021-06-15 NOTE — TELEPHONE ENCOUNTER
RN cannot approve Refill Request    RN can NOT refill this medication Protocol failed and NO refill given. Last office visit: Visit date not found Last Physical: Visit date not found Last MTM visit: Visit date not found Last visit same specialty: 1/12/2021 Estevan Holley MD.  Next visit within 3 mo: Visit date not found  Next physical within 3 mo: Visit date not found      Dominick RIZVI Leandro, [facility] Triage/Med Refill 2/26/2021    Requested Prescriptions   Pending Prescriptions Disp Refills     hydroCHLOROthiazide (HYDRODIURIL) 12.5 MG tablet [Pharmacy Med Name: HYDROCHLOROTHIAZIDE 12.5MG TABLETS] 90 tablet 0     Sig: TAKE 1 TABLET(12.5 MG) BY MOUTH DAILY IN THE MORNING       Diuretics/Combination Diuretics Refill Protocol  Failed - 2/26/2021  9:14 AM        Failed - Serum Potassium in past 12 months      No results found for: LN-POTASSIUM          Failed - Serum Sodium in past 12 months      No results found for: LN-SODIUM          Failed - Serum Creatinine in past 12 months      Creatinine   Date Value Ref Range Status   12/16/2019 0.98 0.70 - 1.30 mg/dL Final             Passed - Visit with PCP or prescribing provider visit in past 12 months     Last office visit with prescriber/PCP: Visit date not found OR same dept: 1/12/2021 Estevan Holley MD OR same specialty: 1/12/2021 Estevan Holley MD  Last physical: Visit date not found Last MTM visit: Visit date not found   Next visit within 3 mo: Visit date not found  Next physical within 3 mo: Visit date not found  Prescriber OR PCP: Garth Plascencia MD  Last diagnosis associated with med order: 1. Benign Essential Hypertension  - hydroCHLOROthiazide (HYDRODIURIL) 12.5 MG tablet [Pharmacy Med Name: HYDROCHLOROTHIAZIDE 12.5MG TABLETS]; TAKE 1 TABLET(12.5 MG) BY MOUTH DAILY IN THE MORNING  Dispense: 90 tablet; Refill: 0    If protocol passes may refill for 12 months if within 3 months of last provider visit (or a total of 15 months).              Passed - Blood pressure on file in past 12 months     BP Readings from Last 1 Encounters:   01/12/21 148/90

## 2021-06-15 NOTE — TELEPHONE ENCOUNTER
New Appointment Needed  What is the reason for the visit:    New Pt Phys - [facility]  Provider Preference: Dr. Tin Stewart  How soon do you need to be seen?: as soon as possible  Waitlist offered?: No  Okay to leave a detailed message:  Yes

## 2021-06-15 NOTE — TELEPHONE ENCOUNTER
Refill sent to pharmacy as requested. Please notify patient of limited quantity prescribed, as he is overdue to have electrolytes and kidney function rechecked in clinic. He has a standing order in place. He could schedule a lab only visit at his convenience sometime within the next 30 days.  Thank you,  Garth Plascencia MD

## 2021-06-15 NOTE — PROGRESS NOTES
"HPI: This patient is a 71yo M who presents to the clinic for evaluation of ear pain at the request of Dr. Holley. The pain is a pressure sensation in and around the ear that he has noticed since about [place]. He was on antibiotics for an \"ear infection,\" but they didn't work. There is no known clenching or grinding behaviors. He just started flonase about a week ago and does think maybe it is helping a little bit already.Denies otorrhea, hearing loss, tinnitus, vertigo, and other major symptoms such as fever, weight loss, odynophagia, dysphagia, and hemoptysis. He has been chewing a lot of gum during this time period.    Past medical history, surgical history, social history, family history, medications, and allergies have been reviewed with the patient and are documented above.    Review of Systems: a 10-system review was performed. Pertinent positives are noted in the HPI and on a separate scanned document in the chart.    PHYSICAL EXAMINATION:  GEN: no acute distress, normocephalic  EYES: extraocular movements are intact, pupils are equal and round. Sclera clear.   EARS: auricles are normally formed. The external auditory canals are clear with minimal to no cerumen. Tympanic membranes are intact bilaterally with no signs of infection, effusion, retractions, or perforations.  NOSE: anterior nares are patent. No percussive tenderness over the sinuses.  OC/OP: clear, dentition is in good repair but with flattening and wear facets. The tongue and palate are fully mobile and symmetric. No masses or lesions.  NECK: soft and supple. No lymphadenopathy or masses. Airway is midline. Tenderness of the right TMJ.  NEURO: CN VII and XII symmetric. alert and oriented. No spontaneous nystagmus. Gait is normal.  PULM: breathing comfortably on room air, normal chest expansion with respiration  CARDS: no cyanosis or clubbing. Normal carotid pulses.    AUDIOGRAM: mild to moderate sloping SNHL bilaterally. Very mild Type C tymp " left, Type A tymp right.    MEDICAL DECISION-MAKING: This patient is a 71yo M with ear discomfort from TMD and possibly very mild ETD. Discussed soft diet, NSAIDS, gum cessation, and a bite guard. He can stay on the flonase for a few more weeks to resolve the very mild ETD. ALso has bilateral SNHL, discussed hearing protection. Medically clear for hearing aids if patient desires them.

## 2021-06-15 NOTE — TELEPHONE ENCOUNTER
Reason for Call: Request for an order or referral:    Order or referral being requested: please place a new order/referral for audiology for insurance purposes . An order was placed for ent, but needs both    Date needed: as soon as possible    Has the patient been seen by the PCP for this problem? YES and NO    Additional comments:     Phone number Patient can be reached at:  Home number on file [phone] (home)    Best Time:      Can we leave a detailed message on this number?  No call back needed    Call taken on 2/10/2021 at 10:51 AM by Dulce Palacios

## 2021-06-15 NOTE — TELEPHONE ENCOUNTER
Please have Dr. Holley place order/referral to Audiology. Pt has Medicare and is required. Pt did send msg to have ENT referral placed but we need Audiology referral as well. Thank you.

## 2021-06-15 NOTE — TELEPHONE ENCOUNTER
RN cannot approve Refill Request    RN can NOT refill this medication PCP messaged that patient is overdue for Labs. Last office visit: 1/12/2021 Estevan Holley MD Last Physical: Visit date not found Last MTM visit: Visit date not found Last visit same specialty: 1/12/2021 Estevan Holley MD.  Next visit within 3 mo: Visit date not found  Next physical within 3 mo: Visit date not found      Tiffany Pink, [facility] Triage/Med Refill 2/25/2021    Requested Prescriptions   Pending Prescriptions Disp Refills     hydroCHLOROthiazide (HYDRODIURIL) 12.5 MG tablet [Pharmacy Med Name: HYDROCHLOROTHIAZIDE 12.5MG TABLETS] 30 tablet 0     Sig: TAKE 1 TABLET(12.5 MG) BY MOUTH DAILY IN THE MORNING       Diuretics/Combination Diuretics Refill Protocol  Failed - 2/25/2021  9:48 AM        Failed - Serum Potassium in past 12 months      No results found for: LN-POTASSIUM          Failed - Serum Sodium in past 12 months      No results found for: LN-SODIUM          Failed - Serum Creatinine in past 12 months      Creatinine   Date Value Ref Range Status   12/16/2019 0.98 0.70 - 1.30 mg/dL Final             Passed - Visit with PCP or prescribing provider visit in past 12 months     Last office visit with prescriber/PCP: 1/12/2021 Estevan Holley MD OR same dept: 1/12/2021 Estevan Holley MD OR same specialty: 1/12/2021 Estevan Holley MD  Last physical: Visit date not found Last MTM visit: Visit date not found   Next visit within 3 mo: Visit date not found  Next physical within 3 mo: Visit date not found  Prescriber OR PCP: Estevan Holley MD  Last diagnosis associated with med order: 1. Benign Essential Hypertension  - hydroCHLOROthiazide (HYDRODIURIL) 12.5 MG tablet [Pharmacy Med Name: HYDROCHLOROTHIAZIDE 12.5MG TABLETS]; TAKE 1 TABLET(12.5 MG) BY MOUTH DAILY IN THE MORNING  Dispense: 30 tablet; Refill: 0    If protocol passes may refill for 12 months if within 3 months of last  provider visit (or a total of 15 months).             Passed - Blood pressure on file in past 12 months     BP Readings from Last 1 Encounters:   01/12/21 148/90

## 2021-06-15 NOTE — TELEPHONE ENCOUNTER
RN cannot approve Refill Request    RN can NOT refill this medication PCP messaged that patient is overdue for Labs. Last office visit: Visit date not found Last Physical: Visit date not found Last MTM visit: Visit date not found Last visit same specialty: 1/12/2021 Estevan Holley MD.  Next visit within 3 mo: Visit date not found  Next physical within 3 mo: Visit date not found      Mabel Blum, [facility] Triage/Med Refill 2/26/2021    Requested Prescriptions   Pending Prescriptions Disp Refills     hydroCHLOROthiazide (HYDRODIURIL) 12.5 MG tablet [Pharmacy Med Name: HYDROCHLOROTHIAZIDE 12.5MG TABLETS] 90 tablet 0     Sig: TAKE 1 TABLET(12.5 MG) BY MOUTH DAILY IN THE MORNING       Diuretics/Combination Diuretics Refill Protocol  Failed - 2/26/2021  3:27 PM        Failed - Serum Potassium in past 12 months      No results found for: LN-POTASSIUM          Failed - Serum Sodium in past 12 months      No results found for: LN-SODIUM          Failed - Serum Creatinine in past 12 months      Creatinine   Date Value Ref Range Status   12/16/2019 0.98 0.70 - 1.30 mg/dL Final             Passed - Visit with PCP or prescribing provider visit in past 12 months     Last office visit with prescriber/PCP: Visit date not found OR same dept: 1/12/2021 Estevan Holley MD OR same specialty: 1/12/2021 Estevan Holley MD  Last physical: Visit date not found Last MTM visit: Visit date not found   Next visit within 3 mo: Visit date not found  Next physical within 3 mo: Visit date not found  Prescriber OR PCP: Garth Plascencia MD  Last diagnosis associated with med order: 1. Benign Essential Hypertension  - hydroCHLOROthiazide (HYDRODIURIL) 12.5 MG tablet [Pharmacy Med Name: HYDROCHLOROTHIAZIDE 12.5MG TABLETS]; TAKE 1 TABLET(12.5 MG) BY MOUTH DAILY IN THE MORNING  Dispense: 90 tablet; Refill: 0    If protocol passes may refill for 12 months if within 3 months of last provider visit (or a total of 15  months).             Passed - Blood pressure on file in past 12 months     BP Readings from Last 1 Encounters:   01/12/21 148/90

## 2021-06-15 NOTE — TELEPHONE ENCOUNTER
Patient scheduled with Dr. Wilkerson.  Reta Adams [facility] ............... 4:35 PM, 02/25/21

## 2021-06-15 NOTE — TELEPHONE ENCOUNTER
Referral Request  Type of referral: ENT  Who s requesting: Patient  Why the request: Ear plugged for over a month, taken anti-biotics, tried flonase  Have you been seen for this request: Yes  Does patient have a preference on a group/provider? [facility] ([place])  Okay to leave a detailed message?  Yes     Patient requesting a referral.  Will Dr. Plascencia ok in absence of Dr. Holley?

## 2021-06-15 NOTE — TELEPHONE ENCOUNTER
Please notify patient that referral to audiology was already placed. Please see telephone encounter dated 2/9/21.   Thanks,  Garth Plascencia MD

## 2021-06-16 PROBLEM — M17.11 OSTEOARTHRITIS OF RIGHT KNEE: Status: ACTIVE | Noted: 2018-04-18

## 2021-06-16 PROBLEM — M70.62 TROCHANTERIC BURSITIS OF BOTH HIPS: Status: ACTIVE | Noted: 2019-01-24

## 2021-06-16 PROBLEM — M70.61 TROCHANTERIC BURSITIS OF BOTH HIPS: Status: ACTIVE | Noted: 2019-01-24

## 2021-06-16 NOTE — PROGRESS NOTES
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  Issues are as follows: Droopy eyelids (dermatochalasis) for which he should consult with his ophthalmologist.  Chronic torn meniscus right knee, which demonstrated on MRI in December 2014, not too symptomatic, but he should continue to work with [facility] on this issue, and also pursue quadriceps muscle strengthening.  Darkly pigmented papules on both sides of his neck, for which I want him to see dermatology.  Sensation of plugged ear on the right side, for which he saw otolaryngology in February 2020.  History of bladder cancer April 2014, working with Dr. Pedro Guerrero at [facility], getting yearly cystoscopies, has also had intermittent tenderness right testicle.  Hypertension in the context of coronary disease, blood pressures running higher over the last year, will escalate his medications by increasing hydrochlorothiazide dose, add amlodipine, continue lisinopril and metoprolol.  Obesity with body mass index 31, likely contributing to blood pressure, would like to see him lose 20 pounds this year.  Coronary artery disease, frequent PVCs, prior myocardial infarction and coronary interventions in 1998, 2008, and then most recently July 2016 with drug-eluting stents placed into the left anterior descending artery and mid circumflex, had satisfactory stress testing October 13, 2020, Holter recording October 2020 demonstrating frequent ventricular ectopy.  Hyperlipidemia in the context of coronary disease, on maximum dose atorvastatin with good control of his lipids in June 2019 with LDL 70, will check today.  Chronic low back pain and scoliosis, does home exercises.  Erectile dysfunction, okay to increase sildenafil up to 100 mg dose.  Gastroesophageal reflux, not too  bothersome.  History of trochanteric bursitis both hips, in remission.  History of tubular adenoma colon polyp 6 mm sigmoid June 2016, due for recheck this year June 2021.  Carpal tunnel syndrome and right thumb osteoarthritis, not too bothersome these days.  Received a second shot of COVID-19 vaccine Pfizer February 22, 2021, he should now consider himself protected, he is up-to-date on pneumococcal vaccines and seasonal influenza as well.    He is fasting this morning, so we will check lipid panel, comprehensive metabolic panel, blood cell counts, thyroid cascade, screening PSA.    Going issue by issue:    Droopy eyelids (dermatochalasis) for which he should consult with his ophthalmologist.  He needs to be following up with his ophthalmologist annually because of history of cataract surgery on the left, and also annual screening for glaucoma.  I told him that the droopy eyelids would be addressed by one of the ophthalmologist who specializes in oculoplastics.    Advanced osteoarthritis right knee, chronic torn meniscus right knee, which demonstrated on MRI in December 2014, not too symptomatic, but he should continue to work with [facility] on this issue, and also pursue quadriceps muscle strengthening.  The 2014 knee MRI demonstrated an extensive complex tear of the posterior horn and body of the medial meniscus, also degenerative arthritis in the medial and patellofemoral compartments with grade IV chondromalacia, also low-grade sprain or inflammation of the medial collateral ligament.  Even though this was 6 years ago, his knee is holding up reasonably well.  He did stress it yesterday when he was moving a panel.  But today says his knee feels pretty good.  There is a mild to moderate sized effusion on physical exam.  I think he could continue with conservative management strategy, but should at [facility] about learning some home exercises to strengthen his quadriceps, which will help protect his knee,  and delay the need for surgery.    Darkly pigmented papules on both sides of his neck, for which I want him to see dermatology.  We will place a request to [facility].    Sensation of plugged ear on the right side, for which he saw otolaryngology in February 2020.      History of bladder cancer April 2014, working with Dr. Pedro Guerrero at [facility], getting yearly cystoscopies, has also had intermittent tenderness right testicle.      Hypertension in the context of coronary disease, blood pressures running higher over the last year, will escalate his medications by increasing hydrochlorothiazide dose, add amlodipine, continue lisinopril and metoprolol.  Today's in clinic reading was 180/100.  His home machine gives him readings of about 150/100.  Weight probably contributing.  Lets escalate his blood pressure medication by adding back amlodipine (took in the past), also increase hydrochlorothiazide from 12.5 up to 25 mg a day.    Obesity with body mass index 31, likely contributing to blood pressure, would like to see him lose 20 pounds this year.  Today we measured him at 205 pounds.  I like to see him get down around 180 pounds by the end of the year.  I reminded him of the importance of eating slowly, controlling portion size, and identifying problem foods to curtail or eliminate, especially starches, sweets, fried foods.  He told me that alcohol consumption is approximately once a week, so that does not sound like a big source of calories for him.    Coronary artery disease, frequent PVCs, prior myocardial infarction and coronary interventions in 1998, 2008, and then most recently July 2016 with drug-eluting stents placed into the left anterior descending artery and mid circumflex, had satisfactory stress testing October 13, 2020, Holter recording October 2020 demonstrating frequent ventricular ectopy.  He follows up with Dr. Omari Wilkes, whom he saw in October 2020.  Holter recording  showed frequent PVCs from a single site, but some complexity noted including frequent couplets and some triplets.  On stress test his exercise capacity was average, no exercise-induced chest pain.  Right bundle branch block.    Hyperlipidemia in the context of coronary disease, on maximum dose atorvastatin with good control of his lipids in June 2019 with LDL 70, will check today.      Chronic low back pain and scoliosis, does home exercises      Erectile dysfunction, okay to increase sildenafil up to 100 mg dose    Gastroesophageal reflux, not too bothersome.      History of trochanteric bursitis both hips, in remission      History of tubular adenoma colon polyp 6 mm sigmoid June 2016, due for recheck this year June 2021.  He already received a letter from [facility] to schedule his follow-up colonoscopy for this year.    Carpal tunnel syndrome and right thumb osteoarthritis, not too bothersome these days     Received a second shot of COVID-19 vaccine Pfizer February 22, 2021, he should now consider himself protected, he is up-to-date on pneumococcal vaccines and seasonal influenza as well.    Patient has been advised of split billing requirements and indicates understanding: Yes    The patient's current medical problems were reviewed.      I have had an Advance Directives discussion with the patient.  The following health maintenance schedule was reviewed with the patient and provided in printed form in the after visit summary:   Health Maintenance Due   Topic Date Due     HEPATITIS C SCREENING  Never done     ZOSTER VACCINES (2 of 3) 01/31/2014     TD 18+ HE  04/19/2021     COLORECTAL CANCER SCREENING  06/23/2021        Subjective:   Chief Complaint: Josafat Madera is an 70 y.o. male here for an Annual Wellness visit.   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  Issues are as follows: Droopy eyelids (dermatochalasis) for which he should consult with his ophthalmologist.  Chronic torn meniscus right knee, which demonstrated on MRI in December 2014, not too symptomatic, but he should continue to work with [facility] on this issue, and also pursue quadriceps muscle strengthening.  Darkly pigmented papules on both sides of his neck, for which I want him to see dermatology.  Sensation of plugged ear on the right side, for which he saw otolaryngology in February 2020.  History of bladder cancer April 2014, working with Dr. Pedro Guerrero at [facility], getting yearly cystoscopies, has also had intermittent tenderness right testicle.  Hypertension in the context of coronary disease, blood pressures running higher over the last year, will escalate his medications by increasing hydrochlorothiazide dose, add amlodipine, continue lisinopril and metoprolol.  Obesity with body mass index 31, likely contributing to blood pressure, would like to see him lose 20 pounds this year.  Coronary artery disease, frequent PVCs, prior myocardial infarction and coronary interventions in 1998, 2008, and then most recently July 2016 with drug-eluting stents placed into the left anterior descending artery and mid circumflex, had satisfactory stress testing October 13, 2020, Holter recording October 2020 demonstrating frequent ventricular ectopy.  Hyperlipidemia in the context of coronary disease, on maximum dose atorvastatin with good control of his lipids in June 2019 with LDL 70, will check today.  Chronic low back pain and scoliosis, does home exercises.  Erectile dysfunction, okay to increase sildenafil up to 100 mg dose.  Gastroesophageal reflux, not too bothersome.  History of trochanteric bursitis both hips, in remission.  History of tubular adenoma colon polyp 6 mm sigmoid June 2016, due for recheck this year June 2021.  Carpal tunnel syndrome and right  thumb osteoarthritis, not too bothersome these days.  Received a second shot of COVID-19 vaccine Pfizer February 22, 2021, he should now consider himself protected, he is up-to-date on pneumococcal vaccines and seasonal influenza as well.    Retired   Drives a school bus for fun    Droopy eyelids    Torn meniscus right knee  [facility] for this issue several years ago-- cortisone shot  Knee is hurting again comes and goes  Was moving a  piano yesterday  Weaker on that side  Not hurting today    MRI RIGHT KNEE  12/4/2014 8:55 AM  CONCLUSION:  1.  Extensive complex tearing of the posterior horn and body medial meniscus.  2.  Degenerative arthritis in the medial and patellofemoral compartments. There is grade 4 chondromalacia patella, and grade 3-4 chondromalacia posterior aspect of the medial femoral condyle.  3.  Low-grade sprain or inflammation of the MCL.  4.  Edema deep to the iliotibial band could be related to IT band friction syndrome.  5.  Large knee joint effusion.    Moles on face  Needs to see Derm    Plugged ear, right  Deepthi Isidro MD  Otolaryngology  evaluation of ear pain at the request of Dr. Holley    Tender right testicle, History of bladder cancer, for which he has been working with Dr. Pedro Guerrero, and getting yearly cystoscopies.  Diagnosis approximately April 2014 treated with cystoscopic removal    Hypertension  Running higher last 6 months    lisinopriL (PRINIVIL,ZESTRIL) 20 MG  Summary: TAKE 1 TABLET(20 MG) BY MOUTH TWICE DAILY  metoprolol succinate (TOPROL-XL) 25 MG    BP Readings from Last 3 Encounters:   03/26/21 (!) 180/100   01/12/21 148/90   12/04/20 150/90   Home heading 150/100  Lab Results   Component Value Date    CREATININE 0.98 12/16/2019    BUN 16 12/16/2019     12/16/2019    K 4.1 12/16/2019     12/16/2019    CO2 29 12/16/2019     Wt Readings from Last 3 Encounters:   03/26/21 205 lb 9.6 oz (93.3 kg)   01/12/21 209 lb (94.8 kg)    12/04/20 207 lb (93.9 kg)     Mild diarrhea, loose stools  Using curterelle probiotic  Avoid lactose    Coronary artery disease, Frequent PVCs.  metoprolol succinate (TOPROL-XL) 25 MG    Prior myocardial infarction and percutaneous interventions in 1998 and 2008.   On 12 August 2008 after the patient had presented with acute occlusion of the right coronary artery. He had 2 stents placed to the vessel.    repeat angiography 14 July 2016 at which time he had PCI with stenting of mid left anterior descending coronary artery ([employer] SMITHA [id number]) and PCI with stenting of mid circumflex coronary artery ([employer] SMITHA [id number]).     In addition, he underwent repeat stress testing 13 October 2020 which again revealed no evidence by ECG of ischemia. He did come operative, he have some PVCs in the 5 beat NSVT run.    Echo 12-4-2020  Summary  1. Normal left ventricular size and systolic performance with a visually estimated ejection fraction of 60-65%.   2. No significant valvular heart disease is identified on this study.   3. Normal right ventricular size and systolic performance.     10-22-20  1. A 24-hour Holter monitor was applied 10/22/2020 with date of interpretation  10/23/2020.  2. Sinus rhythm is present with normal electrocardiographic intervals. The average  heart rate is 74 beats per minute with heart rate ranging between 55 to 100 beats  per minute.  3. No major bradycardia, pauses, or AV block.  4. Frequent ventricular ectopy with 11,285 PVCs which is 10.5% of total heartbeats.  Mostly the PVCs are of a single morphology and appear to originate from the  inferior septal left ventricular region. Some repetitive forms are seen. This  includes several triplets and frequent couplets, a 4-beat casimiro is conducted at a modest   rate of 94 beats per minute  5. Supraventricular ectopy is rare with 103 PACs. There is no atrial tachycardia or atrial fibrillation.  6. Patient activity  diary is reviewed, but no symptoms noted.    DISCUSSION: Frequent PVCs. Most of the PVCs are from a single site, but some  complexity is noted including frequent couplets and some triplets.    10-13-20  The patient's exercise capacity is average. No exercise-induced chest pain    The stress electrocardiogram is negative for inducible ischemic EKG changes. Presence of right bundle branch block can affect the accuracy of the test.    Exercise-induced PVCs and short run of nonsustained VT was noted    ECG Baseline electrocardiogram demonstrates sinus rhythm and right bundle branch block.   The stress electrocardiogram is negative for inducible ischemic EKG changes. Arrhythmias during stress: Non-sustained ventricular tachycardia at a rate of bpm for 5 beat run; 4 beat run. PACs. Occasional PVCs. Arrhythmias during recovery: Occasional PVCs.    2. PVCs. Patient noted to have frequent PVCs on recent Holter monitor with some couplets and triplets. Patient does remember being on a beta-blocker in the past, but is uncertain as to why it was discontinued. Plan:     Obesity  Wt Readings from Last 3 Encounters:   03/26/21 205 lb 9.6 oz (93.3 kg)   01/12/21 209 lb (94.8 kg)   12/04/20 207 lb (93.9 kg)     Hyperlipidemia  atorvastatin (LIPITOR) 80 MG tablet  Lab Results   Component Value Date    CHOL 133 06/27/2019    CHOL 129 04/19/2018    CHOL 146 12/14/2016     Lab Results   Component Value Date    HDL 43 06/27/2019    HDL 45 04/19/2018    HDL 41 12/14/2016     Lab Results   Component Value Date    LDLCALC 70 06/27/2019    LDLCALC 62 04/19/2018    LDLCALC 69 12/14/2016     Lab Results   Component Value Date    TRIG 98 06/27/2019    TRIG 108 04/19/2018    TRIG 179 (H) 12/14/2016     No components found for: CHOLHDL    Chronic low back pain  Scoliosis  Does home exercise    ED, Male Erectile Disorder   sildenafil (VIAGRA) 50 MG tablet    Chronic Reflux Esophagitis  -- not too bad    Trochanteric bursitis of both hips  Bilateral  hip pain  -- not bad these days    Tubular adenoma of colon  [facility]  6 mm polyp in the sigmoid  Also mild sigmoid diverticulosis  Recheck 5 years which would be June 2021.    Carpal Tunnel Syndrome  Chronic pain of right thumb  -- not too bad    Immunization History   Administered Date(s) Administered     COVID-19,PF,Pfizer 02/01/2021, 02/22/2021     DT (pediatric) 01/01/1995, 05/01/2005     Influenza [id number], 12/15/2009     Influenza high dose,seasonal,PF, 65+ yrs 10/30/2015, 11/23/2016, 10/18/2017, 09/26/2018, 11/05/2019     Influenza, inj, historic,unspecified 11/14/2007, 10/10/2008, 09/22/2010, 11/02/2011, 12/17/2012     Influenza, seasonal,quad inj 6-35 mos 12/15/2009, 12/06/2013, 11/25/2014     Influenza,quad,high Dose,PF, 65yr + 10/22/2020     Influenza,seasonal, Inj IIV3 11/25/2014     Pneumo Conj 13-V (2010&after) 05/23/2016     Pneumo Polysac 23-V 11/25/2014     Td,adult,historic,unspecified 05/01/2005     Tdap 04/19/2011     ZOSTER, LIVE 12/06/2013     Review of Systems:  Please see above.  The rest of the review of systems are negative for all systems.    Patient Care Team:  Omega Wilkerson MD as PCP - General (Internal Medicine)  Estevan Holley MD as Assigned PCP  Heydi Wilkes MD as Assigned Heart and Vascular Provider  Deepthi Isidro MD as Assigned Surgical Provider     Patient Active Problem List   Diagnosis     Male Erectile Disorder     Scoliosis     Primary Osteoarthritis Of The Lumbar Vertebrae     Bladder Polyps     Essential Hypercholesterolemia     Benign Essential Hypertension     Coronary Artery Disease     Acute Myocardial Infarction     Carpal Tunnel Syndrome     Chronic Reflux Esophagitis     Joint Pain, Localized In The Knee     Tubular adenoma of colon     Osteoarthritis of right knee     Trochanteric bursitis of both hips     Past Medical History:   Diagnosis Date     Bladder cancer (H)     see [facility] notes      Carpal tunnel syndrome      Cataract      Coronary artery disease      GERD (gastroesophageal reflux disease)      Hypercholesteremia      Hypertension      Lower back pain      Scoliosis       Past Surgical History:   Procedure Laterality Date     ANGIOPLASTY  1998/2008     BLADDER SURGERY  March 2014     CATARACT EXTRACTION W/  INTRAOCULAR LENS IMPLANT Right 7/10/2014    Procedure: Right Cataract Extraction with Intraocular Lens Implantation;  Surgeon: Karl Nava MD;  Location: [facility];  Service:      CATARACT EXTRACTION W/  INTRAOCULAR LENS IMPLANT Left 8/14/2014    Procedure: CATARACT EXTRACTION WITH INTRAOCULAR LENS IMPLANTATION LEFT EYE;  Surgeon: Karl Nava MD;  Location: [facility];  Service:      HERNIA REPAIR  2009    Bilateral     IL REMOVAL OF TONSILS,<13 Y/O      Description: Tonsillectomy;  Recorded: 06/10/2008;     TONSILLECTOMY        Family History   Problem Relation Age of Onset     Stroke Mother      Heart disease Mother      Heart disease Father      Crohn's disease Sister      Diabetes Brother       Social History     Socioeconomic History     Marital status:      Spouse name: Anita     Number of children: Not on file     Years of education: Not on file     Highest education level: Not on file   Occupational History     Not on file   Social Needs     Financial resource strain: Not on file     Food insecurity     Worry: Not on file     Inability: Not on file     Transportation needs     Medical: Not on file     Non-medical: Not on file   Tobacco Use     Smoking status: Former Smoker     Types: Cigars     Smokeless tobacco: Never Used     Tobacco comment: occasional cigar   Substance and Sexual Activity     Alcohol use: Yes     Alcohol/week: 1.0 standard drinks     Types: 1 Glasses of wine per week     Drug use: No     Sexual activity: Yes     Partners: Female   Lifestyle     Physical activity     Days per week: Not on file     Minutes per session: Not on file      "Stress: Not on file   Relationships     Social connections     Talks on phone: Not on file     Gets together: Not on file     Attends [employer] service: Not on file     Active member of club or organization: Not on file     Attends meetings of clubs or organizations: Not on file     Relationship status: Not on file     Intimate partner violence     Fear of current or ex partner: Not on file     Emotionally abused: Not on file     Physically abused: Not on file     Forced sexual activity: Not on file   Other Topics Concern     Not on file   Social History Narrative     Not on file      Current Outpatient Medications   Medication Sig Dispense Refill     aspirin 81 MG tablet Take 81 mg by mouth daily.       atorvastatin (LIPITOR) 80 MG tablet TAKE 1/2 TABLET(40 MG) BY MOUTH EVERY EVENING 45 tablet 3     hydroCHLOROthiazide (HYDRODIURIL) 12.5 MG tablet TAKE 1 TABLET(12.5 MG) BY MOUTH DAILY IN THE MORNING 90 tablet 0     Lactobacillus rhamnosus GG (CULTURELLE) 10-15 Billion cell capsule Take 1 capsule by mouth daily.       lisinopriL (PRINIVIL,ZESTRIL) 20 MG tablet TAKE 1 TABLET(20 MG) BY MOUTH TWICE DAILY 180 tablet 2     metoprolol succinate (TOPROL-XL) 25 MG Take 1 tablet (25 mg total) by mouth daily. 90 tablet 3     multivitamin with minerals (THERA-M) 9 mg iron-400 mcg Tab tablet Take 1 tablet by mouth daily.       omeprazole (PRILOSEC) 20 MG capsule Take 20 mg by mouth daily before breakfast.       sildenafil (VIAGRA) 50 MG tablet Take 1 tablet (50 mg total) by mouth daily as needed for erectile dysfunction. 16 tablet 5     No current facility-administered medications for this visit.       Objective:   Vital Signs:   Visit Vitals  BP (!) 180/100 (Patient Site: Right Arm, Patient Position: Sitting, Cuff Size: Adult Large)   Pulse (!) 52   Temp 98.4  F (36.9  C) (Oral)   Ht 5' 8.25\" (1.734 m)   Wt 205 lb 9.6 oz (93.3 kg)   SpO2 96%   BMI 31.03 kg/m       VisionScreening:  No exam data present     PHYSICAL " EXAM    General: Alert, in no distress  Skin: + Deeply pigmented slightly raised papules, approximately 3 mm, a cluster of 4 of them on the left side of his neck, and also a couple on the right side  Eyes/nose/throat: Eyes without scleral icterus, eye movements normal, pupils equal and reactive, oropharynx clear, ears with normal TM's and canals clear,   MSK: Neck with good ROM  Lymphatic: Neck without adenopathy or masses  Endocrine: Thyroid with no nodules to palpation  Pulm: Lungs clear to auscultation bilaterally  Cardiac: Heart with regular rate and rhythm,   + 2/6 systolic ejection murmur best heard right upper sternal border, no carotid bruits  GI: Abdomen soft, nontender. No palpable enlargement of liver or spleen  MSK: Extremities no tenderness or edema  + Small to moderate sized effusion right knee  Neuro: Moves all extremities, without focal weakness  Psych: Alert, normal mental status. Normal affect and speech      Assessment Results 3/26/2021   Activities of Daily Living No help needed   Instrumental Activities of Daily Living No help needed   Mini Cog Total Score 4   Some recent data might be hidden     A Mini-Cog score of 0-2 suggests the possibility of dementia, score of 3-5 suggests no dementia    Identified Health Risks:     He is at risk for lack of exercise and has been provided with information to increase physical activity for the benefit of his well-being.  The patient was provided with written information regarding signs of hearing loss.  Information on urinary incontinence and treatment options given to patient.  Patient's advanced directive was discussed and I am comfortable with the patient's wishes.

## 2021-06-17 NOTE — TELEPHONE ENCOUNTER
Telephone Encounter by Chey Smith RN at 11/30/2020  9:11 AM     Author: Chey Smith RN Service: -- Author Type: Registered Nurse    Filed: 11/30/2020  9:22 AM Encounter Date: 11/30/2020 Status: Signed    : Chey Smith RN (Registered Nurse)       Patient called with recommendation and reasoning below. Potential side effects of atenolol reviewed with patient. He is agreeable to changes and has no further questions or concerns at this time. He will continue home blood pressure and heart rate monitoring. Rx for atenolol sent to his preferred pharmacy.   ------------------------------------  Heydi Wilkes MD  You 1 hour ago (7:19 AM)     Brooks Guerrier,   Please see note from patient below.  Amlodipine was converted to beta-blocker vis-à-vis PVCs.  Amlodipine would not affect PVC burden, however, metoprolol and other beta-blockers could be somewhat effective at this.  If patient willing, would advocate instead of the metoprolol trying atenolol 25 mg daily to see if this is better tolerated.  Thanks.

## 2021-06-17 NOTE — TELEPHONE ENCOUNTER
Telephone Encounter by Chey Smith RN at 10/2/2020  9:47 AM     Author: Chey Smith RN Service: -- Author Type: Registered Nurse    Filed: 10/2/2020  9:55 AM Encounter Date: 9/29/2020 Status: Signed    : Chey Smith RN (Registered Nurse)       Please call patient to arrange graded stress test (per DOT requirement) and follow up with Dr. Wilkes shortly thereafter for increased tiredness.  Thank you.  -----------------------------------------------------------------------    PC to patient to inform him a  will call to arrange exercise stress test and follow up with Dr. Wilkes shortly thereafter. No further questions at this time.  ----------------------------------------  Heydi Wilkes MD  You 1 hour ago (8:44 AM)     Fine to go ahead and set up exercise treadmill stress test.  Thanks.

## 2021-06-17 NOTE — PROGRESS NOTES
Assessment:  1.  Coronary artery disease, a symptomatic.  2.  Hypertension controlled.  3.  Hyperlipidemia, checking status.  4.  Reflux esophagitis.  5.  Lumbar osteoarthritis.  6.  Right knee osteoarthritis.  7.  Low back pain.  8.  Mildly decreased libido.    Plan: Check fasting lipid, hepatic, basic profile, hemoglobin, and vitamin D and he will return when he is fasting here within the next few days for lab only visit for that.  Assuming those are okay then renew meds as needed for the next 6 months and follow-up for next med check in 6 months.  Recommend regular exercise for his back etc.  He did just start swimming about 1 week ago.  Recommend stretching in the morning due to his morning stiffness from the osteoarthritis.  Explained the option of checking testosterone level but since his symptoms are mild and at this point with his status and physical exam we would be unlikely to do testosterone replacement, he elects to not have that level done at this time.  I explained that he could take the omeprazole just 3 days a week or 4 days a week for better control of his GI symptoms and not have to take it every day.    Subjective: 67-year-old male presenting for follow-up for evaluation of the above.  Regarding the heart he denies any chest pain trouble breathing or leg swelling.  Regarding hypertension no headaches or dizziness.  Regarding lipids no diarrhea constipation muscle aching or muscle weakness.  Regarding reflux esophagitis he does take Tums almost every day, he is not currently taking the omeprazole at all,  He notes he does not have the same interest in sex that he used to have but he and his wife still have intercourse about once a month and he does not have any significant trouble with the erection but a mild difficulty at times.  He did have an injection in the right knee in January by orthopedics.  Past Medical History:   Diagnosis Date     Bladder cancer     [facility]     Carpal  "tunnel syndrome      Cataract      Coronary artery disease      GERD (gastroesophageal reflux disease)      Hypercholesteremia      Hypertension      Lower back pain      Scoliosis      No Known Allergies  Current Outpatient Prescriptions   Medication Sig Dispense Refill     aspirin 81 MG tablet Take 81 mg by mouth daily.       atorvastatin (LIPITOR) 80 MG tablet TAKE 1/2 TABLET(40 MG) BY MOUTH EVERY EVENING 45 tablet 1     hydroCHLOROthiazide (HYDRODIURIL) 12.5 MG tablet TAKE 1 TABLET(12.5 MG) BY MOUTH DAILY IN THE MORNING 90 tablet 1     lisinopril (PRINIVIL,ZESTRIL) 20 MG tablet Take 1 tablet (20 mg total) by mouth 2 (two) times a day. 180 tablet 0     multivitamin with minerals (THERA-M) 9 mg iron-400 mcg Tab tablet Take 1 tablet by mouth daily.       sildenafil (VIAGRA) 50 MG tablet Take 1 tablet (50 mg total) by mouth daily as needed for erectile dysfunction. 16 tablet 5     No current facility-administered medications for this visit.      All other review of systems currently negative.  He has not noticed any difference in his hair growth including his beard growth etc.  Energy level is the same.    Objective:/78  Pulse 82  Ht 5' 9.5\" (1.765 m)  Wt 212 lb (96.2 kg)  BMI 30.86 kg/m2  HEENT exam shows no acute change.  Neck supple without adenopathy or thyromegaly.  Lungs clear.  Heart regular rate and rhythm without murmur.  Abdomen shows no masses tenderness or hepatosplenomegaly.  No pedal edema.  Is alert with clear speech.  "

## 2021-06-17 NOTE — TELEPHONE ENCOUNTER
Telephone Encounter by Chey Smith RN at 10/29/2020  1:01 PM     Author: Chey Smith RN Service: -- Author Type: Registered Nurse    Filed: 10/29/2020  1:02 PM Encounter Date: 10/29/2020 Status: Signed    : Chey Smith RN (Registered Nurse)       PC to patient with message below. Patient has no further questions at this time.  ---------------------------------------  Heydi Wilkes MD  You 10 minutes ago (12:50 PM)     Since not on a long-acting nitroglycerin formulation, that would be fine.  Thanks.    Message text

## 2021-06-18 NOTE — PATIENT INSTRUCTIONS - HE
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  Issues are as follows: Droopy eyelids (dermatochalasis) for which he should consult with his ophthalmologist.  Chronic torn meniscus right knee, which demonstrated on MRI in December 2014, not too symptomatic, but he should continue to work with [employer] on this issue, and also pursue quadriceps muscle strengthening.  Darkly pigmented papules on both sides of his neck, for which I want him to see dermatology.  Sensation of plugged ear on the right side, for which he saw otolaryngology in February 2020.  History of bladder cancer April 2014, working with Dr. Pedro Guerrero at [facility], getting yearly cystoscopies, has also had intermittent tenderness right testicle.  Hypertension in the context of coronary disease, blood pressures running higher over the last year, will escalate his medications by increasing hydrochlorothiazide dose, add amlodipine, continue lisinopril and metoprolol.  Obesity with body mass index 31, likely contributing to blood pressure, would like to see him lose 20 pounds this year.  Coronary artery disease, frequent PVCs, prior myocardial infarction and coronary interventions in 1998, 2008, and then most recently July 2016 with drug-eluting stents placed into the left anterior descending artery and mid circumflex, had satisfactory stress testing October 13, 2020, Holter recording  October 2020 demonstrating frequent ventricular ectopy.  Hyperlipidemia in the context of coronary disease, on maximum dose atorvastatin with good control of his lipids in June 2019 with LDL 70, will check today.  Chronic low back pain and scoliosis, does home exercises.  Erectile dysfunction, okay to increase sildenafil up to 100 mg dose.  Gastroesophageal reflux, not too bothersome.  History of trochanteric bursitis both hips, in remission.  History of tubular adenoma colon polyp 6 mm sigmoid June 2016, due for recheck this year June 2021.  Carpal tunnel syndrome and right thumb osteoarthritis, not too bothersome these days.  Received a second shot of COVID-19 vaccine Pfizer February 22, 2021, he should now consider himself protected, he is up-to-date on pneumococcal vaccines and seasonal influenza as well.    He is fasting this morning, so we will check lipid panel, comprehensive metabolic panel, blood cell counts, thyroid cascade, screening PSA.    Going issue by issue:    Droopy eyelids (dermatochalasis) for which he should consult with his ophthalmologist.  He needs to be following up with his ophthalmologist annually because of history of cataract surgery on the left, and also annual screening for glaucoma.  I told him that the droopy eyelids would be addressed by one of the ophthalmologist who specializes in oculoplastics.    Advanced osteoarthritis right knee, chronic torn meniscus right knee, which demonstrated on MRI in December 2014, not too symptomatic, but he should continue to work with [facility] on this issue, and also pursue quadriceps muscle strengthening.  The 2014 knee MRI demonstrated an extensive complex tear of the posterior horn and body of the medial meniscus, also degenerative arthritis in the medial and patellofemoral compartments with grade IV chondromalacia, also low-grade sprain or inflammation of the medial collateral ligament.  Even though this was 6 years ago, his knee is holding  up reasonably well.  He did stress it yesterday when he was moving a panel.  But today says his knee feels pretty good.  There is a mild to moderate sized effusion on physical exam.  I think he could continue with conservative management strategy, but should at [facility] about learning some home exercises to strengthen his quadriceps, which will help protect his knee, and delay the need for surgery.    Darkly pigmented papules on both sides of his neck, for which I want him to see dermatology.  We will place a request to [facility].    Sensation of plugged ear on the right side, for which he saw otolaryngology in February 2020.      History of bladder cancer April 2014, working with Dr. Pedro Guerrero at [facility], getting yearly cystoscopies, has also had intermittent tenderness right testicle.      Hypertension in the context of coronary disease, blood pressures running higher over the last year, will escalate his medications by increasing hydrochlorothiazide dose, add amlodipine, continue lisinopril and metoprolol.  Today's in clinic reading was 180/100.  His home machine gives him readings of about 150/100.  Weight probably contributing.  Lets escalate his blood pressure medication by adding back amlodipine (took in the past), also increase hydrochlorothiazide from 12.5 up to 25 mg a day.    Obesity with body mass index 31, likely contributing to blood pressure, would like to see him lose 20 pounds this year.  Today we measured him at 205 pounds.  I like to see him get down around 180 pounds by the end of the year.  I reminded him of the importance of eating slowly, controlling portion size, and identifying problem foods to curtail or eliminate, especially starches, sweets, fried foods.  He told me that alcohol consumption is approximately once a week, so that does not sound like a big source of calories for him.    Coronary artery disease, frequent PVCs, prior myocardial infarction and  coronary interventions in 1998, 2008, and then most recently July 2016 with drug-eluting stents placed into the left anterior descending artery and mid circumflex, had satisfactory stress testing October 13, 2020, Holter recording October 2020 demonstrating frequent ventricular ectopy.  He follows up with Dr. Omari Wilkes, whom he saw in October 2020.  Holter recording showed frequent PVCs from a single site, but some complexity noted including frequent couplets and some triplets.  On stress test his exercise capacity was average, no exercise-induced chest pain.  Right bundle branch block.    Hyperlipidemia in the context of coronary disease, on maximum dose atorvastatin with good control of his lipids in June 2019 with LDL 70, will check today.      Chronic low back pain and scoliosis, does home exercises      Erectile dysfunction, okay to increase sildenafil up to 100 mg dose    Gastroesophageal reflux, not too bothersome.      History of trochanteric bursitis both hips, in remission      History of tubular adenoma colon polyp 6 mm sigmoid June 2016, due for recheck this year June 2021.  He already received a letter from [facility] to schedule his follow-up colonoscopy for this year.    Carpal tunnel syndrome and right thumb osteoarthritis, not too bothersome these days     Received a second shot of COVID-19 vaccine Pfizer February 22, 2021, he should now consider himself protected, he is up-to-date on pneumococcal vaccines and seasonal influenza as well.    Patient has been advised of split billing requirements and indicates understanding: Yes      Patient Education     Exercise for a Healthier Heart  You may wonder how you can improve the health of your heart. If youre thinking about exercise, youre on the right track. You dont need to become an athlete, but you do need a certain amount of brisk exercise to help strengthen your heart. If you have been diagnosed with a heart condition, your doctor may  recommend exercise to help stabilize your condition. To help make exercise a habit, choose safe, fun activities.       Be sure to check with your health care provider before starting an exercise program.    Why exercise?  Exercising regularly offers many healthy rewards. It can help you do all of the following:    Improve your blood cholesterol levels to help prevent further heart trouble    Lower your blood pressure to help prevent a stroke or heart attack    Control diabetes, or reduce your risk of getting this disease    Improve your heart and lung function    Reach and maintain a healthy weight    Make your muscles stronger and more limber so you can stay active    Prevent falls and fractures by slowing the loss of bone mass (osteoporosis)    Manage stress better  Exercise tips  Ease into your routine. Set small goals. Then build on them.  Exercise on most days. Aim for a total of 150 or more minutes of moderate to  vigorous intensity activity each week. Consider 40 minutes, 3 to 4 times a week. For best results, activity should last for 40 minutes on average. It is OK to work up to the 40 minute period over time. Examples of moderate-intensity activity is walking one mile in 15 minutes or 30 to 45 minutes of yard work.  Step up your daily activity level. Along with your exercise program, try being more active throughout the day. Walk instead of drive. Do more household tasks or yard work.  Choose one or more activities you enjoy. Walking is one of the easiest things you can do. You can also try swimming, riding a bike, or taking an exercise class.  Stop exercising and call your doctor if you:    Have chest pain or feel dizzy or lightheaded    Feel burning, tightness, pressure, or heaviness in your chest, neck, shoulders, back, or arms    Have unusual shortness of breath    Have increased joint or muscle pain    Have palpitations or an irregular heartbeat      2932-5440 [employer]. [address] [place] [address]  [place] [address]. All rights reserved. This information is not intended as a substitute for professional medical care. Always follow your healthcare professional's instructions.         Patient Education   Signs of Hearing Loss  Hearing loss is a problem shared by many people. In fact, it is one of the most common health conditions, particularly as people age. Most people over age 65 have some hearing loss, and by age 80, almost everyone does. Because hearing loss usually occurs slowly over the years, you may not realize your hearing ability has gotten worse.       Have your hearing checked  Contact your [facility] care provider if you:    Have to strain to hear normal conversation.    Have to watch other peoples faces very carefully to follow what theyre saying.    Need to ask people to repeat what theyve said.    Often misunderstand what people are saying.    Turn the volume of the television or radio up so high that others complain.    Feel that people are mumbling when theyre talking to you.    Find that the effort to hear leaves you feeling tired and irritated.    Notice, when using the phone, that you hear better with 1 ear than the other.    2398-8623 [employer]. [address] [place] [address]. All rights reserved. This information is not intended as a substitute for professional medical care. Always follow your healthcare professional's instructions.         Patient Education   Urinary Incontinence (Male)    Urinary incontinence means not being able to control the release of urine from the bladder.  Causes  Common causes of urinary incontinence in men include:    Infection    Certain medicines    Aging    Poor pelvic muscle tone    Bladder spasms    Obesity    Urinary retention  Nervous system diseases, diabetes, sleep apnea, urinary tract infections, prostate surgery, and pelvic trauma can also cause incontinence. Constipation and smoking have also been identified as risk  factors.  Symptoms    Urge incontinence (also called overactive bladder) is a sudden urge to urinate even though there may not be much urine in the bladder. The need to urinate often during the night is common. It is due to bladder spasms.    Stress incontinence is involuntary urine leakage that can occur with sneezing, coughing, and other actions that put stress on the bladder.  Treatment  Treatment of urinary incontinence depends on the cause. Infections of the bladder are treated with antibiotics. Urinary retention is treated with a bladder catheter.  Home care  Follow these guidelines when caring for yourself at home:    Don't consume foods and drinks that may irritate the bladder. These include drinks containing alcohol, caffeinate, or carbonation; chocolate; and acidic fruits and juices.    Limit fluid intake to 6 to 8 cups a day.    Lose weight if you are overweight. This will reduce your symptoms.    If needed, wear absorbent pads to catch urine. Change pads frequently to maintain hygiene and prevent skin and bladder infections.    Bathe daily to maintain good hygiene.    If an antibiotic was prescribed to treat a bladder infection, be sure to take it until finished, even if you are feeling better before then. This is to make sure your infection has cleared.    If a catheter was left in place, it is important to keep bacteria from getting into the collection bag. Don't disconnect the catheter from the collection bag.    Use a leg band to secure the catheter drainage tube, so it does not pull on the catheter. Drain the collection bag when it becomes full using the drain spout at the bottom of the bag. Don't disconnect the bag from the catheter.    Don't pull on or try to remove a catheter. The catheter must be removed by a healthcare provider.  Follow-up care  Follow up with your healthcare provider, or as advised.  When to seek medical advice  Call your healthcare provider right away if any of these  occur:    Fever over 100.4 F (38 C), or as directed by your healthcare provider    Bladder pain or fullness    Abdominal swelling, nausea, or vomiting    Back pain    Weakness, dizziness, or fainting    If a catheter was left in place, return if:  ? Catheter falls out  ? Catheter stops draining for 6 hours  Date Last Reviewed: 10/1/2017    [id number] [employer]. [address] [place] [address]. All rights reserved. This information is not intended as a substitute for professional medical care. Always follow your healthcare professional's instructions.       Advance Directive  Patients advance directive was discussed and I am comfortable with the patients wishes.  Patient Education   Personalized Prevention Plan  You are due for the preventive services outlined below.  Your care team is available to assist you in scheduling these services.  If you have already completed any of these items, please share that information with your care team to update in your medical record.  Health Maintenance Due   Topic Date Due   ? HEPATITIS C SCREENING  Never done   ? ZOSTER VACCINES (2 of 3) 01/31/2014   ? TD 18+ HE  04/19/2021   ? COLORECTAL CANCER SCREENING  06/23/2021

## 2021-06-21 NOTE — PROGRESS NOTES
Assessment:  1.  Coronary artery disease, asymptomatic.  2.  Hypertension controlled.  3.  Hyperlipidemia, checking status.  4.  Intermittent right epididymal pain, normal exam.  5.  Lumbar osteoarthritis.  6.  Right knee osteoarthritis.  7.  Erectile dysfunction.    Plan: Check basic and hepatic profile and direct LDL.  Printed prescription for sildenafil 50 mg-#16-refill x5-use 1 daily as needed for erectile dysfunction.  Encouraged him regarding physical exercise and refill meds as needed.  Follow-up in 6 months at minimum but earlier as needed.  I agree with just observing the right epididymal area.  I agree with not pursuing surgery at this time on the right knee and recommend regular exercise for strengthening the muscles and for flexibility.  Spent 40 minutes with least 50% in counseling.    Subjective: 67-year-old male presenting for follow-up on multiple issues with questions regarding multiple issues.  Regarding the heart he denies any chest pain trouble breathing or leg swelling.  Regarding hypertension no headaches or dizziness or leg swelling.  Regarding lipids no diarrhea constipation muscle aching or muscle weakness.  He states that he has seen urology and was told he had epididymitis on the right side and that he should just observe that.  He notes he is not having any pain right now.  Regarding his right knee he notes he was told by the orthopedic surgeon that he should have surgery on the knee but because his pain is been intermittent he is reluctant to do that and thinks is better to observe at this time.  He notes he can have a week or 2 where he has pain in the knee and then he can have a week or 2 where he does not have pain in the knee.  He has the known lumbar osteoarthritis.  He notes that sometimes when he is walking he gets pain on the outside of the hip bilateral that clears up with resting and comes back with walking.  But he does not get any pain in the groin area when he is doing that  walking.  He does request refill on sildenafil for erectile dysfunction and requests a printed prescription.  He is use that and it has been tolerated and does not get any side effects from that.  Past Medical History:   Diagnosis Date     Bladder cancer (H)     see [facility] notes     Carpal tunnel syndrome      Cataract      Coronary artery disease      GERD (gastroesophageal reflux disease)      Hypercholesteremia      Hypertension      Lower back pain      Scoliosis      No Known Allergies  Current Outpatient Medications   Medication Sig Dispense Refill     aspirin 81 MG tablet Take 81 mg by mouth daily.       atorvastatin (LIPITOR) 80 MG tablet TAKE 1/2 TABLET(40 MG) BY MOUTH EVERY EVENING 45 tablet 3     hydroCHLOROthiazide (HYDRODIURIL) 12.5 MG tablet TAKE 1 TABLET(12.5 MG) BY MOUTH DAILY IN THE MORNING 90 tablet 2     lisinopril (PRINIVIL,ZESTRIL) 20 MG tablet TAKE 1 TABLET BY MOUTH TWICE DAILY 180 tablet 1     multivitamin with minerals (THERA-M) 9 mg iron-400 mcg Tab tablet Take 1 tablet by mouth daily.       sildenafil (VIAGRA) 50 MG tablet Take 1 tablet (50 mg total) by mouth daily as needed for erectile dysfunction. 16 tablet 5     No current facility-administered medications for this visit.      All other review of systems are currently negative.    Objective:/80   Pulse 90   Wt 210 lb (95.3 kg)   SpO2 97%   BMI 30.57 kg/m    HEENT examination shows no acute change.  Neck supple without adenopathy or thyromegaly.  Lungs clear.  Heart regular rate and rhythm without murmur.  Abdomen shows no masses tenderness or hepatosplenomegaly.  No pedal edema.  Genitalia normal with normal testes and no hernia.  No palpable groin adenopathy.  No tenderness at the greater trochanteric process bilateral.  He does ambulate independently.  No significant skin lesions seen.  Is alert with clear speech.

## 2021-06-23 NOTE — PROGRESS NOTES
ASSESSMENT/PLAN:       1. Trochanteric bursitis of both hips  -Injected today without difficulty. Follow up if not improving and we can consider low back work up and evaluation.   - methylPREDNISolone acetate injection 40 mg (DEPO-MEDROL); 1 mL (40 mg total) by Intra-Lesional route once.  - methylPREDNISolone acetate injection 40 mg (DEPO-MEDROL); Inject 1 mL (40 mg total) into the shoulder, thigh, or buttocks once.  - bupivacaine 0.25 % (2.5 mg/mL) injection 7.5 mg (MARCAINE); Inject 3 mL (7.5 mg total) into the joint once.  - bupivacaine 0.25 % (2.5 mg/mL) injection 7.5 mg (MARCAINE); Inject 3 mL (7.5 mg total) into the joint once.  - Injection - Other      Return if symptoms worsen or fail to improve.      Carie Payton MD      PROGRESS NOTE   1/29/2019    SUBJECTIVE:  Josafat Madera is a 68 y.o. male  who presents for bilateral hip pain.     He has pain only with walking, when he stops and stands for a while it will increase in pain again. He will get to the point where he doesn't want to walk due to the pain. He was with his grandson and kept up with him. It doesn't hurt to the touch, with pressure it does feel better. It is on the outside of his hip both sides. When he walks on the treadmill it doesn't hurt or hurts less.     He does go to [place] in a month.   Chief Complaint   Patient presents with     Hip Pain     Patient has been having bilateral hip pain while walking for years. Patient has not recieved cortisone injections in the hips before.          Patient Active Problem List   Diagnosis     Male Erectile Disorder     Scoliosis     Primary Osteoarthritis Of The Lumbar Vertebrae     Bladder Polyps     Essential Hypercholesterolemia     Benign Essential Hypertension     Coronary Artery Disease     Acute Myocardial Infarction     Carpal Tunnel Syndrome     Chronic Reflux Esophagitis     Joint Pain, Localized In The Knee     Tubular adenoma of colon     Osteoarthritis of right knee      Trochanteric bursitis of both hips       Current Outpatient Medications   Medication Sig Dispense Refill     amLODIPine (NORVASC) 5 MG tablet Take 1 tablet (5 mg total) by mouth daily. 90 tablet 3     aspirin 81 MG tablet Take 81 mg by mouth daily.       atorvastatin (LIPITOR) 80 MG tablet TAKE 1/2 TABLET(40 MG) BY MOUTH EVERY EVENING 45 tablet 3     cholecalciferol, vitamin D3, (VITAMIN D3) 2,000 unit capsule Take 2,000 Units by mouth daily.       hydroCHLOROthiazide (HYDRODIURIL) 12.5 MG tablet TAKE 1 TABLET(12.5 MG) BY MOUTH DAILY IN THE MORNING 90 tablet 2     lisinopril (PRINIVIL,ZESTRIL) 20 MG tablet TAKE 1 TABLET BY MOUTH TWICE DAILY 180 tablet 0     multivitamin with minerals (THERA-M) 9 mg iron-400 mcg Tab tablet Take 1 tablet by mouth daily.       omeprazole (PRILOSEC) 20 MG capsule Take 20 mg by mouth daily before breakfast.       sildenafil (VIAGRA) 50 MG tablet Take 1 tablet (50 mg total) by mouth daily as needed for erectile dysfunction. 16 tablet 5     No current facility-administered medications for this visit.        Social History     Tobacco Use   Smoking Status Former Smoker     Types: Cigars   Smokeless Tobacco Never Used   Tobacco Comment    occasional cigar           OBJECTIVE:        No results found for this or any previous visit (from the past 240 hour(s)).    Vitals:    01/29/19 1105   BP: 138/76   Patient Site: Left Arm   Patient Position: Sitting   Cuff Size: Adult Regular   Pulse: 80   SpO2: 98%   Weight: 211 lb 11.2 oz (96 kg)     Weight: 211 lb 11.2 oz (96 kg)          Physical Exam:  GENERAL APPEARANCE: A&A, NAD, well hydrated, well nourished  SKIN:  Normal skin turgor, no lesions/rashes   HEENT: moist mucous membranes, no rhinorrhea  Back: No tenderness to palpation over the midline  EXTREMITY: no edema, no tenderness palpation over the greater trochanters but that is the area with pain  NEURO: no gross deficits

## 2021-06-23 NOTE — PROGRESS NOTES
"Injection - Other  Date/Time: 1/29/2019 11:30 AM  Performed by: Carie Payton MD  Authorized by: Carie Payton MD   Consent: Verbal consent obtained.  Risks and benefits: risks, benefits and alternatives were discussed  Consent given by: patient  Patient understanding: patient does not state understanding of the procedure being performed  Patient consent: the patient's understanding of the procedure does not match consent given  Procedure consent: procedure consent does not match procedure scheduled  Relevant documents: relevant documents not present or verified  Test results: test results not available  Site marked: the operative site was not marked  Imaging studies: imaging studies not available  Required items: required blood products, implants, devices, and special equipment available  Patient identity confirmed: verbally with patient  Time out: Immediately prior to procedure a \"time out\" was called to verify the correct patient, procedure, equipment, support staff and site/side marked as required.  Preparation: Patient was prepped and draped in the usual sterile fashion.  Local anesthesia used: no    Anesthesia:  Local anesthesia used: no    Sedation:  Patient sedated: no    Patient tolerance: Patient tolerated the procedure well with no immediate complications        Greater Trochanter Bursitis Injection Procedure Note    Pre-operative Diagnosis: bilateral greater trochanteric bursitis    Post-operative Diagnosis: same    Indications: failure of conservative therapy    Procedure Details   Verbal consent for the procedure was obtained. After a sterile Betadine prep spinal needle was advanced to the bilateral greater trochanters. Free flow into the greater trochanteric space was confirmed and injected with 1 ml of methylprednisolone (DepoMedrol) 40mg/ml and 3 ml of 0.25% bupivicaine. The area was cleansed with isopropyl alcohol and a dressing was applied.    Complications:  None; " patient tolerated the procedure well.

## 2021-06-23 NOTE — PROGRESS NOTES
Assessment:  1.  Bilateral greater trochanteric bursitis.    Plan: We discussed options and I recommend that it is worthwhile for him to pursue getting bilateral steroid injections in the greater trochanteric bursa area.  I explained that Dr. Payton here can do those injections as well as he could see 1 of the orthopedists where he has been seen at [facility].  It is okay for him to do the ibuprofen as needed and take that prior to activity.  I explained that if for any reason the above did not work we could also refer him to physical therapy but at this point I think the injections were the first step to try.  He understands and agrees.  He had questions about the new shingles vaccine and we also discussed the option of that but he knows his insurance does not cover that and does not wish to get that at this time.  I explained that he could be due for a 5-year booster on his Pneumovax in November of this year but will await and see whether or not recommendations change.    Subjective: 68-year-old male presenting for evaluation of bilateral hip pain that he describes as being on the outside of the hips and is aggravated by walking activity.  Although it feels better when he stops and rests he notes that it does not always go away.  He has had no fever nausea or vomiting.  He has had the pain in the lower back for some time but no change in that.  He did have a recent pain in the right side of the scrotum and has a history of an epididymitis in the past, but he notes that pain is largely resolved.  No swelling in the scrotum or groin difficulty and no trouble with urinating or bowel movements.  He notes he taken his grandson to the [employer] and had difficulty walking from the parking [place] into the [place] recently.  This has been going on for a number of months and he had it prior to that visit in November and see that note.  Past Medical History:   Diagnosis Date     Bladder cancer (H)     see [facility]     Carpal tunnel syndrome      Cataract      Coronary artery disease      GERD (gastroesophageal reflux disease)      Hypercholesteremia      Hypertension      Lower back pain      Scoliosis      No Known Allergies  Current Outpatient Medications   Medication Sig Dispense Refill     amLODIPine (NORVASC) 5 MG tablet Take 1 tablet (5 mg total) by mouth daily. 90 tablet 3     aspirin 81 MG tablet Take 81 mg by mouth daily.       atorvastatin (LIPITOR) 80 MG tablet TAKE 1/2 TABLET(40 MG) BY MOUTH EVERY EVENING 45 tablet 3     cholecalciferol, vitamin D3, (VITAMIN D3) 2,000 unit capsule Take 2,000 Units by mouth daily.       hydroCHLOROthiazide (HYDRODIURIL) 12.5 MG tablet TAKE 1 TABLET(12.5 MG) BY MOUTH DAILY IN THE MORNING 90 tablet 2     lisinopril (PRINIVIL,ZESTRIL) 20 MG tablet TAKE 1 TABLET BY MOUTH TWICE DAILY 180 tablet 0     multivitamin with minerals (THERA-M) 9 mg iron-400 mcg Tab tablet Take 1 tablet by mouth daily.       omeprazole (PRILOSEC) 20 MG capsule Take 20 mg by mouth daily before breakfast.       sildenafil (VIAGRA) 50 MG tablet Take 1 tablet (50 mg total) by mouth daily as needed for erectile dysfunction. 16 tablet 5     No current facility-administered medications for this visit.      He notes he has not tried using ibuprofen for this.    Objective:/84   Pulse 71   Wt 210 lb (95.3 kg)   SpO2 98%   BMI 30.57 kg/m    Back shows no tenderness.  No visible skin abnormality.  No SI joint tenderness.  Abdomen shows no masses or tenderness.  No groin adenopathy.  No pain with the bilateral hips being in the 90 degree flexion position with putting force on the axial femur.  He does have tenderness at the posterior aspect of the greater trochanteric bursa bilateral.  No visible skin swelling or erythema.  He walks normally.  He can bend over and almost touch his toes.

## 2021-06-26 ENCOUNTER — HEALTH MAINTENANCE LETTER (OUTPATIENT)
Age: 71
End: 2021-06-26

## 2021-06-26 NOTE — PROGRESS NOTES
Progress Notes by Heydi Wilkes MD at 12/28/2017  9:10 AM     Author: Heydi Wilkes MD Service: -- Author Type: Physician    Filed: 12/28/2017  9:51 AM Encounter Date: 12/28/2017 Status: Signed    : Heydi Wilkes MD (Physician)                  [email or web address]  [phone]         Thank you Dr. Boothe for asking the [facility] team to participate in the care of your patient, Josafat Madera.     Impression and Plan     1.  Coronary artery disease.  Josafat has known coronary artery disease. Specifically, Kyle has known coronary disease having had prior myocardial infarction and percutaneous interventions in 1998 and 2008. On 12 August 2008 after the patient had presented with acute occlusion of the right coronary artery. He had 2 stents placed to the vessel.     More recently, Kyle underwent repeat angiography 14 July 2016 at which time he had PCI with stenting of mid left anterior descending coronary artery ([employer] SMITHA [id number]) and PCI with stenting of mid circumflex coronary artery ([employer] SMITHA 3.0x20mm).  See Cardiac Diagnostics section below for details.     This is stable.  Patient denies any angina symptoms at this time.     2.  Hypertension.  Blood pressure was mild to moderately elevated in the office today.  This was discussed with patient.  He is very reticent to increase any of this antihypertensive therapy and states he is intent on losing weight and pursuing exercise which I certainly advocated.  I asked that he continue to monitor his blood pressure on an intermittent basis and contact either you or I should he have a tendency toward continued high readings despite these measures.     3.  Dyslipidemia.  Fairly recent lipid profile 17 May 2016 was favorable with LDL 67 mg/dL and HDL 40 mg/dL.  More recent direct LDL 30 August 2017 was fairly close to target at 87 mg/dL.    Plan on follow-up in one year.          History of Present Illness    Once again I would like to thank you again for asking me to participate in the care of your patient, Josafat Madera.  As you know, but to reiterate for my own records, Josafat Madera is a 67 y.o. male with known coronary artery disease. Specifically, Kyle has known coronary disease having had prior myocardial infarction and percutaneous interventions in 1998 and 2008. On 12 August 2008 after the patient had presented with acute occlusion of the right coronary artery. He had 2 stents placed to the vessel.     More recently, Kyle underwent repeat angiography 14 July 2016 at which time he had PCI with stenting of mid left anterior descending coronary artery ([employer] SMITHA [id number]) and PCI with stenting of mid circumflex coronary artery ([employer] SMITHA 3.0x20mm).  See Cardiac Diagnostics section below for details.    On interview today, Kyle is without specific complaint.  He denies any chest pain symptoms.  He reports no worsening shortness of breath or subjective decline in exercise tolerance.  He denies any palpitations or lightheadedness.    Further review of systems is otherwise negative/noncontributory (medical record and 13 point review of systems reviewed as well and pertinent positives noted).         Cardiac Diagnostics      Coronary angiography 14 July 2016:  1. Left anterior descending coronary artery: Mid 75% stenosis.  2. Left circumflex coronary artery: Mid 90% stenosis with more severe distal stenosis.  3. Right coronary artery: 25% proximal stenosis and 40% distal stenosis.  4. Status post PCI with stenting of mid left anterior descending coronary artery ([employer] SMITHA [id number]).  5. Status post PCI with stenting of mid circumflex coronary artery ([employer] SMITHA 3.0x20mm).    Recommendations:  o Consider medical therapy with possible PCI of distal LCX if symptoms not controlled. However small in caliber  and likely too small for stent  o Continue with prasugrel (Effient  ) for 12 months, but if necessary could discontinue earlier at 3 months. If tolerated would advocate 12 month course.    Nuclear stress perfusion study 1 July 2016 (pre-coronary angiogram which was performed 14 July 2016):  1. There is a large, severe perfusion defect involving the entire lateral wall on the left ventricle that redistributes on the rest images consistent with ischemia.  2. Normal left ventricular systolic performance with ejection fraction of 54% with lateral hypokinesis.    Nuclear stress perfusion study 28 October 2008:  1. Medium-sized area of non-transmural infarction in the inferior wall.  2. Normal left ventricular systolic performance with ejection fraction of 66% with very small area of basal inferior hypokinesis.    CT coronary angiography 22 May 2014:  1. Left main without significant stenosis.  2. Left anterior descending has a calcified plaque from distal left main and other calcified plaques and soft plaques in the mid left anterior descending which cause moderate stenosis of 50-60%. It appears to be non-obstructive in the left anterior descending.  3. Mild to moderate disease in the left circumflex.  4. There is mild disease in the proximal right coronary artery. The stent in the right coronary artery is patent. The distal right coronary artery stent is not able to be evaluated because of significant artifact.    Coronary angiography 12 August 2008 (performed at [facility] in [facility]) :  1. Left main coronary artery: Mild irregularities.  2. Left anterior descending coronary artery: 70-80% stenosis at the branch point of the first major diagonal.  3. Circumflex artery with small obtuse marginal branch occlusion.  4. Right coronary artery: Vessel occluded in the proximal third portion. Culprit vessel for myocardial infarction.  5. PCI with stent placement (3.5×12 mm drug-eluting stent) to right coronary  "artery.             Physical Examination       /80 (Patient Site: Right Arm, Patient Position: Sitting, Cuff Size: Adult Large)  Pulse 100  Resp 18  Ht 5' 9.5\" (1.765 m)  Wt 214 lb (97.1 kg)  BMI 31.15 kg/m2        Wt Readings from Last 3 Encounters:   12/28/17 214 lb (97.1 kg)   08/30/17 210 lb (95.3 kg)   04/17/17 215 lb (97.5 kg)       The patient is alert and oriented times three. Sclerae are anicteric. Mucosal membranes are moist. Jugular venous pressure is normal. No significant adenopathy/thyromegally appreciated. Lungs are clear with good expansion. On cardiovascular exam, the patient has a regular S1 and S2. Abdomen is soft and non-tender. Extremities reveal no clubbing, cyanosis, or edema.         Family History/Social History/Risk Factors   Patient does not smoke.  Family history of hypertension.         Medications  Allergies   Current Outpatient Prescriptions   Medication Sig Dispense Refill   ? aspirin 81 MG tablet Take 81 mg by mouth daily.     ? atorvastatin (LIPITOR) 80 MG tablet TAKE 1/2 TABLET(40 MG) BY MOUTH EVERY EVENING 45 tablet 1   ? hydroCHLOROthiazide (HYDRODIURIL) 12.5 MG tablet TAKE 1 TABLET(12.5 MG) BY MOUTH DAILY IN THE MORNING 90 tablet 1   ? lisinopril (PRINIVIL,ZESTRIL) 20 MG tablet Take 1 tablet (20 mg total) by mouth 2 (two) times a day. 180 tablet 0   ? multivitamin with minerals (THERA-M) 9 mg iron-400 mcg Tab tablet Take 1 tablet by mouth daily.     ? omeprazole (PRILOSEC OTC) 20 MG tablet Take 20 mg by mouth daily.      ? sildenafil (VIAGRA) 50 MG tablet Take 1 tablet (50 mg total) by mouth daily as needed for erectile dysfunction. 16 tablet 5   ? atorvastatin (LIPITOR) 80 MG tablet Take 0.5 tablets (40 mg total) by mouth every evening. 90 tablet 0   ? lisinopril (PRINIVIL,ZESTRIL) 20 MG tablet TAKE 1 TABLET BY MOUTH TWICE DAILY 60 tablet 0     No current facility-administered medications for this visit.       No Known Allergies       Lab Results   Lab Results "   Component Value Date     08/30/2017    K 4.7 08/30/2017     08/30/2017    CO2 29 08/30/2017    BUN 15 08/30/2017    CREATININE 0.85 08/30/2017    CALCIUM 9.5 08/30/2017     Lab Results   Component Value Date    WBC 8.4 07/14/2016    WBC 8.9 08/11/2014    HGB 15.4 08/30/2017    HCT 39.4 (L) 07/14/2016    MCV 87 07/14/2016     07/14/2016     Lab Results   Component Value Date    CHOL 146 12/14/2016    TRIG 179 (H) 12/14/2016    HDL 41 12/14/2016    LDLCALC 69 12/14/2016    LDLDIRECT 87 08/30/2017     Lab Results   Component Value Date    CKTOTAL 104 09/17/2013    CKTOTAL 195 (H) 06/05/2013    CKTOTAL 188 07/27/2010    CKMB 2 07/14/2016    TROPONINI 0.03 05/22/2014    TROPONINI 0.01 05/21/2014    TROPONINI <0.01 05/21/2014

## 2021-06-27 NOTE — PROGRESS NOTES
Progress Notes by Heydi Wilkes MD at 12/28/2018 10:30 AM     Author: Heydi Wilkes MD Service: -- Author Type: Physician    Filed: 12/28/2018 10:46 AM Encounter Date: 12/28/2018 Status: Signed    : Heydi Wilkes MD (Physician)                  [email or web address]  [phone]         Thank you Dr. Boothe for asking the [facility] team to participate in the care of your patient, Josafat Madera.     Impression and Plan     1.  Coronary artery disease.  Josafat has known coronary artery disease. Specifically, Kyle has known coronary disease having had prior myocardial infarction and percutaneous interventions in 1998 and 2008. On 12 August 2008 after the patient had presented with acute occlusion of the right coronary artery. He had 2 stents placed to the vessel.     Kyle underwent repeat angiography 14 July 2016 at which time he had PCI with stenting of mid left anterior descending coronary artery ([place] [employer] SMITHA 2.92y09oc) and PCI with stenting of mid circumflex coronary artery ([place] [employer] SMITHA 3.0x20mm).  See Cardiac Diagnostics section below for details.    Kyle underwent a recent exercise stress test 21 December 2018 which was favorable and revealed no evidence of ischemia and normal functional capacity.     This is stable.  Patient denies any angina symptoms at this time.     2.  Hypertension.  Pressure somewhat elevated in the in the office today.  He has been checking his blood pressure at home as well and there too has been having some tendency toward higher readings.  Plan:    Add amlodipine 5 mg daily.     3.  Dyslipidemia.  Direct LDL 14 November 2018 was fairly close to target at 85 mg/dL.     Plan on follow-up in one year.         History of Present Illness    Once again I would like to thank you again for asking me to participate in the care of your patient, Josafat Madera.  As you know, but to reiterate for my own records,  Josafat Madera is a 68 y.o. male with known coronary artery disease. Specifically, Kyle has known coronary disease having had prior myocardial infarction and percutaneous interventions in 1998 and 2008. On 12 August 2008 after the patient had presented with acute occlusion of the right coronary artery. He had 2 stents placed to the vessel.     Kyle underwent repeat angiography 14 July 2016 at which time he had PCI with stenting of mid left anterior descending coronary artery ([place] [employer] SMITHA [id number]) and PCI with stenting of mid circumflex coronary artery ([place] [employer] SMITHA 3.0x20mm).  See Cardiac Diagnostics section below for details.    Kyle underwent a recent exercise stress test 21 December 2018 that was favorable and revealed no evidence of ischemia and normal functional capacity.     On interview today, Kyle is without specific complaint.  He denies any chest pain symptoms.  He reports no worsening shortness of breath or subjective decline in exercise tolerance.  He denies any palpitations or lightheadedness.    Further review of systems is otherwise negative/noncontributory (medical record and 13 point review of systems reviewed as well and pertinent positives noted).         Cardiac Diagnostics      Exercise stress test 19 December 2018:  1. No ECG evidence of ischemia.  2. No chest pain symptoms reported with exercise.  3. Normal functional capacity.     Nuclear stress perfusion study 1 July 2016 (pre-coronary angiogram which was performed 14 July 2016):  1. There is a large, severe perfusion defect involving the entire lateral wall on the left ventricle that redistributes on the rest images consistent with ischemia.  2. Normal left ventricular systolic performance with ejection fraction of 54% with lateral hypokinesis.    Nuclear stress perfusion study 28 October 2008:  1. Medium-sized area of non-transmural infarction in the inferior wall.  2. Normal left ventricular systolic  performance with ejection fraction of 66% with very small area of basal inferior hypokinesis.    Coronary angiography 14 July 2016:  1. Left anterior descending coronary artery: Mid 75% stenosis.  2. Left circumflex coronary artery: Mid 90% stenosis with more severe distal stenosis.  3. Right coronary artery: 25% proximal stenosis and 40% distal stenosis.  4. Status post PCI with stenting of mid left anterior descending coronary artery ([place] [employer] SMITHA [id number]).  5. Status post PCI with stenting of mid circumflex coronary artery ([place] [employer] SMITHA 3.0x20mm).    Recommendations:  o Consider medical therapy with possible PCI of distal LCX if symptoms not controlled. However small in caliber and likely too small for stent  o Continue with prasugrel (Effient  ) for 12 months, but if necessary could discontinue earlier at 3 months. If tolerated would advocate 12 month course.    CT coronary angiography 22 May 2014:  1. Left main without significant stenosis.  2. Left anterior descending has a calcified plaque from distal left main and other calcified plaques and soft plaques in the mid left anterior descending which cause moderate stenosis of 50-60%. It appears to be non-obstructive in the left anterior descending.  3. Mild to moderate disease in the left circumflex.  4. There is mild disease in the proximal right coronary artery. The stent in the right coronary artery is patent. The distal right coronary artery stent is not able to be evaluated because of significant artifact.    Coronary angiography 12 August 2008 (performed at [facility] in [facility], [place], operated by [facility]) :  1. Left main coronary artery: Mild irregularities.  2. Left anterior descending coronary artery: 70-80% stenosis at the branch point of the first major diagonal.  3. Circumflex artery with small obtuse marginal branch occlusion.  4. Right coronary artery: Vessel occluded in the proximal third portion. Culprit vessel for  "myocardial infarction.  5. PCI with stent placement (3.5×12 mm drug-eluting stent) to right coronary artery.           Physical Examination       /84 (Patient Site: Right Arm, Patient Position: Sitting, Cuff Size: Adult Large)   Pulse 84   Resp 16   Ht 5' 9.5\" (1.765 m)   Wt 213 lb (96.6 kg)   BMI 31.00 kg/m          Wt Readings from Last 3 Encounters:   12/28/18 213 lb (96.6 kg)   11/14/18 210 lb (95.3 kg)   09/26/18 209 lb (94.8 kg)     The patient is alert and oriented times three. Sclerae are anicteric. Mucosal membranes are moist. Jugular venous pressure is normal. No significant adenopathy/thyromegally appreciated. Lungs are clear with good expansion. On cardiovascular exam, the patient has a regular S1 and S2. Abdomen is soft and non-tender. Extremities reveal no clubbing, cyanosis, or edema.         Family History/Social History/Risk Factors   Patient does not smoke.  Family history of hypertension.         Medications  Allergies   Current Outpatient Medications   Medication Sig Dispense Refill   ? aspirin 81 MG tablet Take 81 mg by mouth daily.     ? atorvastatin (LIPITOR) 80 MG tablet TAKE 1/2 TABLET(40 MG) BY MOUTH EVERY EVENING 45 tablet 3   ? cholecalciferol, vitamin D3, (VITAMIN D3) 2,000 unit capsule Take 2,000 Units by mouth daily.     ? hydroCHLOROthiazide (HYDRODIURIL) 12.5 MG tablet TAKE 1 TABLET(12.5 MG) BY MOUTH DAILY IN THE MORNING 90 tablet 2   ? lisinopril (PRINIVIL,ZESTRIL) 20 MG tablet TAKE 1 TABLET BY MOUTH TWICE DAILY 180 tablet 0   ? multivitamin with minerals (THERA-M) 9 mg iron-400 mcg Tab tablet Take 1 tablet by mouth daily.     ? omeprazole (PRILOSEC) 20 MG capsule Take 20 mg by mouth daily before breakfast.     ? sildenafil (VIAGRA) 50 MG tablet Take 1 tablet (50 mg total) by mouth daily as needed for erectile dysfunction. 16 tablet 5   ? amLODIPine (NORVASC) 5 MG tablet Take 1 tablet (5 mg total) by mouth daily. 90 tablet 3     No current facility-administered " medications for this visit.       No Known Allergies       Lab Results   Lab Results   Component Value Date     11/14/2018    K 4.2 11/14/2018     11/14/2018    CO2 29 11/14/2018    BUN 14 11/14/2018    CREATININE 0.82 11/14/2018    CALCIUM 9.8 11/14/2018     Lab Results   Component Value Date    WBC 8.4 07/14/2016    WBC 8.9 08/11/2014    HGB 15.0 04/19/2018    HCT 39.4 (L) 07/14/2016    MCV 87 07/14/2016     07/14/2016     Lab Results   Component Value Date    CHOL 129 04/19/2018    TRIG 108 04/19/2018    HDL 45 04/19/2018    LDLCALC 62 04/19/2018    LDLDIRECT 85 11/14/2018     Lab Results   Component Value Date    CKTOTAL 104 09/17/2013    CKTOTAL 195 (H) 06/05/2013    CKTOTAL 188 07/27/2010    CKMB 2 07/14/2016    TROPONINI 0.03 05/22/2014    TROPONINI 0.01 05/21/2014    TROPONINI <0.01 05/21/2014

## 2021-06-28 NOTE — PROGRESS NOTES
Progress Notes by Heydi Wilkes MD at 1/2/2020 10:10 AM     Author: Heydi Wilkes MD Service: -- Author Type: Physician    Filed: 1/2/2020 10:33 AM Encounter Date: 1/2/2020 Status: Signed    : Heydi Wilkes MD (Physician)                                       Thank you Dr. Boothe for asking the [facility] Care team to participate in the care of your patient, Josafat Madera.     Impression and Plan     1.  Coronary artery disease.  Josafat has known coronary artery disease. Specifically, Kyle has known coronary disease having had prior myocardial infarction and percutaneous interventions in 1998 and 2008. On 12 August 2008 after the patient had presented with acute occlusion of the right coronary artery. He had 2 stents placed to the vessel.     Kyle underwent repeat angiography 14 July 2016 at which time he had PCI with stenting of mid left anterior descending coronary artery ([place] [employer] SMITHA 2.33c09md) and PCI with stenting of mid circumflex coronary artery ([place] [employer] SMITHA 3.0x20mm).  See Cardiac Diagnostics section below for details.     Kyle underwent a recent exercise stress test 21 December 2018 which was favorable and revealed no evidence of ischemia and normal functional capacity.     This is stable.  Patient denies any angina symptoms at this time.     2.  Hypertension.  Blood pressure is reasonable in the office today at 126/74 mmHg..       3.  Dyslipidemia.  Direct LDL 16 December 2019 was fairly close to target with LDL 82 mg/dL.       *Continue high intensity statin therapy.     Plan on follow-up in one year.         History of Present Illness    Once again I would like to thank you again for asking me to participate in the care of your patient, Josafat Madera.  As you know, but to reiterate for my own records, Josafat Madera is a 69 y.o. male with known coronary artery disease. Specifically, Kyle has known coronary disease having  had prior myocardial infarction and percutaneous interventions in 1998 and 2008. On 12 August 2008 after the patient had presented with acute occlusion of the right coronary artery. He had 2 stents placed to the vessel.      Kyle underwent repeat angiography 14 July 2016 at which time he had PCI with stenting of mid left anterior descending coronary artery ([place] [employer] SMITHA 2.53w42an) and PCI with stenting of mid circumflex coronary artery ([place] [employer] SMITHA 3.0x20mm).  See Cardiac Diagnostics section below for details.     Kyle underwent a recent exercise stress test 21 December 2018 that was favorable and revealed no evidence of ischemia and normal functional capacity.     On interview today, Kyle is without specific complaint.  He denies any chest pain symptoms.  He reports no worsening shortness of breath or subjective decline in exercise tolerance.  He denies any palpitations or lightheadedness.  He continues to do water aerobics with his wife at the [facility] and has had no problems with this activity.    Further review of systems is otherwise negative/noncontributory (medical record and 13 point review of systems reviewed as well and pertinent positives noted).         Cardiac Diagnostics         Exercise stress test 19 December 2018:  1. No ECG evidence of ischemia.  2. No chest pain symptoms reported with exercise.  3. Normal functional capacity.    Nuclear stress perfusion study 1 July 2016 (pre-coronary angiogram which was performed 14 July 2016):  1. There is a large, severe perfusion defect involving the entire lateral wall on the left ventricle that redistributes on the rest images consistent with ischemia.  2. Normal left ventricular systolic performance with ejection fraction of 54% with lateral hypokinesis.    Nuclear stress perfusion study 28 October 2008:  1. Medium-sized area of non-transmural infarction in the inferior wall.  2. Normal left ventricular systolic performance  with ejection fraction of 66% with very small area of basal inferior hypokinesis.    Coronary angiography 14 July 2016:  1. Left anterior descending coronary artery: Mid 75% stenosis.  2. Left circumflex coronary artery: Mid 90% stenosis with more severe distal stenosis.  3. Right coronary artery: 25% proximal stenosis and 40% distal stenosis.  4. Status post PCI with stenting of mid left anterior descending coronary artery ([place] [employer] SMITHA 2.75n78fg).  5. Status post PCI with stenting of mid circumflex coronary artery ([place] [employer] SMITHA 3.0x20mm).  o Recommendations:    Consider medical therapy with possible PCI of distal LCX if symptoms not controlled. However small in caliber and likely too small for stent    Continue with prasugrel (Effient  ) for 12 months, but if necessary could discontinue earlier at 3 months. If tolerated would advocate 12 month course.    CT coronary angiography 22 May 2014:  1. Left main without significant stenosis.  2. Left anterior descending has a calcified plaque from distal left main and other calcified plaques and soft plaques in the mid left anterior descending which cause moderate stenosis of 50-60%. It appears to be non-obstructive in the left anterior descending.  3. Mild to moderate disease in the left circumflex.  4. There is mild disease in the proximal right coronary artery. The stent in the right coronary artery is patent. The distal right coronary artery stent is not able to be evaluated because of significant artifact.    Coronary angiography 12 August 2008 (performed at [facility]) :  1. Left main coronary artery: Mild irregularities.  2. Left anterior descending coronary artery: 70-80% stenosis at the branch point of the first major diagonal.  3. Circumflex artery with small obtuse marginal branch occlusion.  4. Right coronary artery: Vessel occluded in the proximal third portion. Culprit vessel for myocardial  "infarction.  5. PCI with stent placement (3.5×12 mm drug-eluting stent) to right coronary artery.             Physical Examination       /74 (Patient Site: Right Arm, Patient Position: Sitting, Cuff Size: Adult Large)   Pulse 80   Resp 16   Ht 5' 9.5\" (1.765 m)   Wt 215 lb (97.5 kg)   BMI 31.29 kg/m          Wt Readings from Last 3 Encounters:   01/02/20 215 lb (97.5 kg)   12/16/19 211 lb (95.7 kg)   05/23/19 210 lb (95.3 kg)     The patient is alert and oriented times three. Sclerae are anicteric. Mucosal membranes are moist. Jugular venous pressure is normal. No significant adenopathy/thyromegally appreciated. Lungs are clear with good expansion. On cardiovascular exam, the patient has a regular S1 and S2. Abdomen is soft and non-tender. Extremities reveal no clubbing, cyanosis, or edema.         Family History/Social History/Risk Factors   Patient does not smoke.  Family history reviewed, and family history includes Crohn's disease in his sister; Diabetes in his brother; Heart disease in his father and mother; Stroke in his mother.          Medications  Allergies   Current Outpatient Medications   Medication Sig Dispense Refill   ? amLODIPine (NORVASC) 5 MG tablet Take 1 tablet (5 mg total) by mouth daily. 90 tablet 3   ? aspirin 81 MG tablet Take 81 mg by mouth daily.     ? atorvastatin (LIPITOR) 80 MG tablet TAKE 1/2 TABLET(40 MG) BY MOUTH EVERY EVENING 45 tablet 2   ? hydroCHLOROthiazide (HYDRODIURIL) 12.5 MG tablet TAKE 1 TABLET(12.5 MG) BY MOUTH DAILY IN THE MORNING 90 tablet 2   ? lisinopril (PRINIVIL,ZESTRIL) 20 MG tablet Take 1 tablet (20 mg total) by mouth 2 (two) times a day. 180 tablet 0   ? multivitamin with minerals (THERA-M) 9 mg iron-400 mcg Tab tablet Take 1 tablet by mouth daily.     ? omeprazole (PRILOSEC) 20 MG capsule Take 20 mg by mouth daily before breakfast.     ? sildenafil (VIAGRA) 50 MG tablet Take 1 tablet (50 mg total) by mouth daily as needed for erectile dysfunction. 16 " tablet 5     No current facility-administered medications for this visit.       No Known Allergies       Lab Results   Lab Results   Component Value Date     12/16/2019    K 4.1 12/16/2019     12/16/2019    CO2 29 12/16/2019    BUN 16 12/16/2019    CREATININE 0.98 12/16/2019    CALCIUM 9.7 12/16/2019     Lab Results   Component Value Date    WBC 8.4 07/14/2016    WBC 8.9 08/11/2014    HGB 15.0 04/19/2018    HCT 39.4 (L) 07/14/2016    MCV 87 07/14/2016     07/14/2016     Lab Results   Component Value Date    CHOL 133 06/27/2019    TRIG 98 06/27/2019    HDL 43 06/27/2019    LDLCALC 70 06/27/2019    LDLDIRECT 82 12/16/2019     Lab Results   Component Value Date    CKTOTAL 104 09/17/2013    CKTOTAL 195 (H) 06/05/2013    CKTOTAL 188 07/27/2010    CKMB 2 07/14/2016    TROPONINI 0.03 05/22/2014    TROPONINI 0.01 05/21/2014    TROPONINI <0.01 05/21/2014

## 2021-06-29 NOTE — PROGRESS NOTES
Progress Notes by Heydi Wilkes MD at 10/26/2020  3:30 PM     Author: Heydi Wilkes MD Service: -- Author Type: Physician    Filed: 10/27/2020 12:48 PM Encounter Date: 10/26/2020 Status: Signed    : Heydi Wilkes MD (Physician)                                       Thank you Dr. Holley for asking the [facility] Care team to participate in the care of your patient, Josafat Madera.     Impression and Plan     1.  Coronary artery disease.  Josafat has known coronary artery disease. Specifically, Kyle has known coronary disease having had prior myocardial infarction and percutaneous interventions in 1998 and 2008. On 12 August 2008 after the patient had presented with acute occlusion of the right coronary artery. He had 2 stents placed to the vessel.     Kyle underwent repeat angiography 14 July 2016 at which time he had PCI with stenting of mid left anterior descending coronary artery ([employer] Synergy SMITHA [id number]) and PCI with stenting of mid circumflex coronary artery ([employer] Synergy SMITHA 3.0x20mm).  See Cardiac Diagnostics section below for details.     Kyle underwent a recent exercise stress test 21 December 2018 which was favorable and revealed no evidence of ischemia and normal functional capacity.    In addition, he underwent repeat stress testing 13 October 2020 which again revealed no evidence by ECG of ischemia.  He did come operative, he have some PVCs in the 5 beat NSVT run.     Patient does report some subjective shortness of breath with certain activities though perhaps this is related to some deconditioning.  Symptoms are somewhat sporadic and not consistent, over.  Plan:    Echocardiogram to rule out any other structural heart disease which may be a contributor.    2.  PVCs.  Patient noted to have frequent PVCs on recent Holter monitor with some couplets and triplets.  Patient does remember being on a beta-blocker in the past, but is  uncertain as to why it was discontinued.  Plan:   Trial of metoprolol    Trial of metoprolol succinate 25 mg daily.    Trial of metoprolol succinate 25 mg/day.      3.  Hypertension.  Blood pressure is reasonable in the office today at 126/74 mmHg.  Blood pressure is reasonable in the office today.      Plan to discontinue amlodipine given the addition of metoprolol may obviate the need to continue the dihydropyridine calcium channel blocker.    3.  Dyslipidemia.  Direct LDL 16 December 2019 was fairly close to target with LDL 82 mg/dL.    Continue high intensity statin therapy.    Patient plans to obtain a repeat lipid profile at primary providers later this year.     Further recommendations pending echocardiographic findings.           History of Present Illness    Once again I would like to thank you again for asking me to participate in the care of your patient, Josafat Madera.  As you know, but to reiterate for my own records, Josafat Madera is a 69 y.o. male with known coronary artery disease. Specifically, Kyle has known coronary disease having had prior myocardial infarction and percutaneous interventions in 1998 and 2008. On 12 August 2008 after the patient had presented with acute occlusion of the right coronary artery. He had 2 stents placed to the vessel.      Kyle underwent repeat angiography 14 July 2016 at which time he had PCI with stenting of mid left anterior descending coronary artery ([employer] Synergy SMITHA [id number]) and PCI with stenting of mid circumflex coronary artery ([employer] Synergy SMITHA 3.0x20mm).  See Cardiac Diagnostics section below for details.     Kyle underwent an exercise stress test 21 December 2018 that was favorable and revealed no evidence of ischemia and normal functional capacity.     On interview today, Kyle does report some shortness of breath with certain activities.  He denies chest pain, over.  He feels in part this may be due to some deconditioning and  that with the Covid pandemic he has not been attending the [facility] like he normally had in the past.  He minimizes any subjective palpitations despite PVCs.  Reports no lightheadedness.  Denies any fevers, chills, or other constitutional symptoms.    Further review of systems is otherwise negative/noncontributory (medical record and 13 point review of systems reviewed as well and pertinent positives noted).         Cardiac Diagnostics      Exercise stress test 13 October 2020 (formally reviewed by Dr. Maxx Russell (Ted) and also personally reviewed):  1. No ECG evidence of ischemia.  2. No chest pain symptoms with exercise.  3. Exercise-induced PVCs and short run of nonsustained VT was noted (5 beat).    Exercise stress test 19 December 2018:  1. No ECG evidence of ischemia.  2. No chest pain symptoms reported with exercise.  3. Normal functional capacity.    Nuclear stress perfusion study 1 July 2016 (pre-coronary angiogram which was performed 14 July 2016):  1. There is a large, severe perfusion defect involving the entire lateral wall on the left ventricle that redistributes on the rest images consistent with ischemia.  2. Normal left ventricular systolic performance with ejection fraction of 54% with lateral hypokinesis.    Nuclear stress perfusion study 28 October 2008:  1. Medium-sized area of non-transmural infarction in the inferior wall.  2. Normal left ventricular systolic performance with ejection fraction of 66% with very small area of basal inferior hypokinesis.    Coronary angiography 14 July 2016:  1. Left anterior descending coronary artery: Mid 75% stenosis.  2. Left circumflex coronary artery: Mid 90% stenosis with more severe distal stenosis.  3. Right coronary artery: 25% proximal stenosis and 40% distal stenosis.  4. Status post PCI with stenting of mid left anterior descending coronary artery ([employer] Synergy SMITHA [id number]).  5. Status post PCI with stenting of mid  "circumflex coronary artery ([employer] Synergy SMITHA 3.0x20mm).    Recommendations:  o Consider medical therapy with possible PCI of distal LCX if symptoms not controlled. However small in caliber and likely too small for stent  o Continue with prasugrel (Effient  ) for 12 months, but if necessary could discontinue earlier at 3 months. If tolerated would advocate 12 month course.    CT coronary angiography 22 May 2014:  1. Left main without significant stenosis.  2. Left anterior descending has a calcified plaque from distal left main and other calcified plaques and soft plaques in the mid left anterior descending which cause moderate stenosis of 50-60%. It appears to be non-obstructive in the left anterior descending.  3. Mild to moderate disease in the left circumflex.  4. There is mild disease in the proximal right coronary artery. The stent in the right coronary artery is patent. The distal right coronary artery stent is not able to be evaluated because of significant artifact.    Coronary angiography 12 August 2008 (performed at [facility] in [facility]) :  1. Left main coronary artery: Mild irregularities.  2. Left anterior descending coronary artery: 70-80% stenosis at the branch point of the first major diagonal.  3. Circumflex artery with small obtuse marginal branch occlusion.  4. Right coronary artery: Vessel occluded in the proximal third portion. Culprit vessel for myocardial infarction.  5. PCI with stent placement (3.5×12 mm drug-eluting stent) to right coronary artery.     Holter monitor 22 October 2020:  Frequent PVCs.  Most of the PVCs are from a single site, but somecomplexity is noted including frequent couplets and some triplets.       Physical Examination       /68 (Patient Site: Left Arm, Patient Position: Sitting, Cuff Size: Adult Large)   Pulse 60   Resp 14   Ht 5' 9.5\" (1.765 m)   Wt 207 lb (93.9 kg)   BMI 30.13 kg/m          Wt Readings from Last 3 Encounters: "   10/26/20 207 lb (93.9 kg)   06/22/20 211 lb (95.7 kg)   01/15/20 209 lb 9 oz (95.1 kg)     The patient is alert and oriented times three. Sclerae are anicteric. Mucosal membranes are moist. Jugular venous pressure is normal. No significant adenopathy/thyromegally appreciated. Lungs are clear with good expansion. On cardiovascular exam, the patient has a regular S1 and S2. Abdomen is soft and non-tender. Extremities reveal no clubbing, cyanosis, or edema.         Family History/Social History/Risk Factors   Patient does not smoke.  Family history reviewed, and family history includes Crohn's disease in his sister; Diabetes in his brother; Heart disease in his father and mother; Stroke in his mother.          Medications  Allergies   Current Outpatient Medications   Medication Sig Dispense Refill   ? aspirin 81 MG tablet Take 81 mg by mouth daily.     ? atorvastatin (LIPITOR) 80 MG tablet TAKE 1/2 TABLET(40 MG) BY MOUTH EVERY EVENING 45 tablet 3   ? hydroCHLOROthiazide (HYDRODIURIL) 12.5 MG tablet TAKE 1 TABLET(12.5 MG) BY MOUTH DAILY IN THE MORNING 90 tablet 1   ? lisinopriL (PRINIVIL,ZESTRIL) 20 MG tablet TAKE 1 TABLET(20 MG) BY MOUTH TWICE DAILY 180 tablet 2   ? multivitamin with minerals (THERA-M) 9 mg iron-400 mcg Tab tablet Take 1 tablet by mouth daily.     ? omeprazole (PRILOSEC) 20 MG capsule Take 20 mg by mouth daily before breakfast.     ? sildenafil (VIAGRA) 50 MG tablet Take 1 tablet (50 mg total) by mouth daily as needed for erectile dysfunction. 16 tablet 5   ? metoprolol succinate (TOPROL-XL) 25 MG Take 1 tablet (25 mg total) by mouth daily. 90 tablet 3     No current facility-administered medications for this visit.       No Known Allergies       Lab Results   Lab Results   Component Value Date     12/16/2019    K 4.1 12/16/2019     12/16/2019    CO2 29 12/16/2019    BUN 16 12/16/2019    CREATININE 0.98 12/16/2019    CALCIUM 9.7 12/16/2019     Lab Results   Component Value Date    WBC 8.4  07/14/2016    WBC 8.9 08/11/2014    HGB 15.0 04/19/2018    HCT 39.4 (L) 07/14/2016    MCV 87 07/14/2016     07/14/2016     Lab Results   Component Value Date    CHOL 133 06/27/2019    TRIG 98 06/27/2019    HDL 43 06/27/2019    LDLCALC 70 06/27/2019    LDLDIRECT 82 12/16/2019     Lab Results   Component Value Date    CKTOTAL 104 09/17/2013    CKTOTAL 195 (H) 06/05/2013    CKTOTAL 188 07/27/2010    CKMB 2 07/14/2016    TROPONINI 0.03 05/22/2014    TROPONINI 0.01 05/21/2014    TROPONINI <0.01 05/21/2014

## 2021-06-30 NOTE — PROGRESS NOTES
"Progress Notes by Maia Bee AuD at 2/11/2021  9:20 AM     Author: Maia Bee AuD Service: -- Author Type: Audiologist    Filed: 2/11/2021 10:56 AM Encounter Date: 2/11/2021 Status: Signed    : Maia Bee AuD (Audiologist)       Hearing evaluation in conjunction with ENT exam (Dr. Isidro)    Summary:  Audiology visit completed. Please see audiogram below or under \"media\" tab for history and results.    Transducer:  Both insert phones and circumaural headphones were used.    Reliability:    Good    Recommendations:  Follow-up with ENT; retest hearing annually (to monitor) or per medical management/patient concern.  Wear hearing protection consistently in noise to preserve residual hearing sensitivity and to minimize the effects of tinnitus.  Josafat Madera is a potential binaural amplification candidate, if patient motivation exists and medical clearance is granted. Hearing aid questions were answered; a copy of the [facility] consumer brochure on the purchase of amplification was provided and discussed briefly.      Franci Langston, [facility]  [place] Licensed Audiologist [id number]           "

## 2021-07-03 NOTE — ADDENDUM NOTE
Addendum Note by Aniya Zeng CMA at 5/23/2019  9:00 AM     Author: Aniya Zeng CMA Service: -- Author Type: Certified Medical Assistant    Filed: 5/23/2019 12:16 PM Encounter Date: 5/23/2019 Status: Signed    : Aniya Zeng CMA (Certified Medical Assistant)    Addended by: ANIYA ZEGN on: 5/23/2019 12:16 PM        Modules accepted: Orders

## 2021-07-03 NOTE — ADDENDUM NOTE
Addendum Note by Abdulaziz Boothe MD at 11/15/2017  4:20 PM     Author: Abdulaziz Boothe MD Service: -- Author Type: Physician    Filed: 11/15/2017  4:20 PM Encounter Date: 11/15/2017 Status: Signed    : Abdulaziz Boothe MD (Physician)    Addended by: ABDULAZIZ BOOTHE on: 11/15/2017 04:20 PM        Modules accepted: Orders

## 2021-07-03 NOTE — ADDENDUM NOTE
Addendum Note by Molina Barger RN at 12/19/2018  7:56 AM     Author: Molina Barger RN Service: -- Author Type: Registered Nurse    Filed: 12/19/2018  7:56 AM Encounter Date: 12/18/2018 Status: Signed    : Molina Barger RN (Registered Nurse)    Addended by: MOLINA BARGER on: 12/19/2018 07:56 AM        Modules accepted: Orders

## 2021-07-19 DIAGNOSIS — E78.00 PURE HYPERCHOLESTEROLEMIA: ICD-10-CM

## 2021-07-21 DIAGNOSIS — E78.00 PURE HYPERCHOLESTEROLEMIA: ICD-10-CM

## 2021-07-23 RX ORDER — ATORVASTATIN CALCIUM 80 MG/1
TABLET, FILM COATED ORAL
Qty: 45 TABLET | Refills: 1 | Status: SHIPPED | OUTPATIENT
Start: 2021-07-23 | End: 2021-07-27

## 2021-07-23 NOTE — TELEPHONE ENCOUNTER
"Last Written Prescription Date:  7/13/20  Last Fill Quantity: 45,  # refills: 3   Last office visit provider:  3/26/21     Requested Prescriptions   Pending Prescriptions Disp Refills     atorvastatin (LIPITOR) 80 MG tablet [Pharmacy Med Name: ATORVASTATIN 80MG TABLETS] 45 tablet 3     Sig: TAKE 1/2 TABLET(40 MG) BY MOUTH EVERY EVENING       Statins Protocol Failed - 7/19/2021  8:57 AM        Failed - LDL on file in past 12 months     Recent Labs   Lab Test 03/26/21  0954   LDL 73             Passed - No abnormal creatine kinase in past 12 months     No lab results found.             Passed - Recent (12 mo) or future (30 days) visit within the authorizing provider's specialty     Patient has had an office visit with the authorizing provider or a provider within the authorizing providers department within the previous 12 mos or has a future within next 30 days. See \"Patient Info\" tab in inbasket, or \"Choose Columns\" in Meds & Orders section of the refill encounter.              Passed - Medication is active on med list        Passed - Patient is age 18 or older             Dominick Reeves RN 07/23/21 2:22 PM  "

## 2021-07-25 RX ORDER — ATORVASTATIN CALCIUM 80 MG/1
TABLET, FILM COATED ORAL
Qty: 45 TABLET | Refills: 3 | OUTPATIENT
Start: 2021-07-25

## 2021-07-26 NOTE — TELEPHONE ENCOUNTER
Duplicate request.  Atorvastatin already filled  atorvastatin (LIPITOR) 80 MG tablet 45 tablet 1 7/23/2021  No   Sig: TAKE 1/2 TABLET(40 MG) BY MOUTH EVERY EVENING   Sent to pharmacy as: Atorvastatin Calcium 80 MG Oral Tablet (LIPITOR)   Class: E-Prescribe   Order: [id number]   E-Prescribing Status: Receipt confirmed by pharmacy (7/23/2021  2:23 PM CDT     Zoya Lindsey, RN  Triage Nurse Advisor

## 2021-08-17 DIAGNOSIS — I10 HYPERTENSION: ICD-10-CM

## 2021-08-17 RX ORDER — LISINOPRIL 20 MG/1
20 TABLET ORAL DAILY
Qty: 180 TABLET | Refills: 1 | Status: SHIPPED | OUTPATIENT
Start: 2021-08-17 | End: 2021-08-20

## 2021-08-20 DIAGNOSIS — I10 HYPERTENSION: ICD-10-CM

## 2021-08-20 RX ORDER — LISINOPRIL 20 MG/1
20 TABLET ORAL 2 TIMES DAILY
Qty: 180 TABLET | Refills: 1 | Status: SHIPPED | OUTPATIENT
Start: 2021-08-20 | End: 2022-03-10

## 2021-08-20 NOTE — TELEPHONE ENCOUNTER
PC to pharmacy, and informed of entry error. Will send new prescription for the patient. Spoke with the patient and informed the patient of the entry error, prescription fixed, and continue as prescribed, taking twice daily. Pharmacy has a new prescription on file. Apologetic for the error, pt understanding. No further questions at this time. ADELINE,GINO

## 2021-08-20 NOTE — TELEPHONE ENCOUNTER
===View-only below this line===  ----- Message -----  From: Shae Silva  Sent: 8/20/2021  11:32 AM CDT  To: Chey Smith RN      Caller: Patient  Primary cardiologist: Dr. Wilkes    Detailed reason for call: Patient stated that he thinks Dr. Wilkes made a mistake with the refill he was taking 2 a day for Lisinopril and the refill was for 1 a day. Please advise    Best phone number: [phone]  Best time to contact: today  Ok to leave a detailed message? yes  Device? No

## 2021-10-11 ENCOUNTER — HEALTH MAINTENANCE LETTER (OUTPATIENT)
Age: 71
End: 2021-10-11

## 2021-10-14 DIAGNOSIS — I10 ESSENTIAL HYPERTENSION: ICD-10-CM

## 2021-10-14 DIAGNOSIS — I25.10 CORONARY ARTERY DISEASE INVOLVING NATIVE CORONARY ARTERY OF NATIVE HEART WITHOUT ANGINA PECTORIS: Primary | ICD-10-CM

## 2021-10-14 RX ORDER — METOPROLOL SUCCINATE 25 MG/1
25 TABLET, EXTENDED RELEASE ORAL DAILY
Qty: 90 TABLET | Refills: 3 | Status: SHIPPED | OUTPATIENT
Start: 2021-10-14 | End: 2022-09-30

## 2021-12-14 ENCOUNTER — TRANSFERRED RECORDS (OUTPATIENT)
Dept: HEALTH INFORMATION MANAGEMENT | Facility: CLINIC | Age: 71
End: 2021-12-14
Payer: MEDICARE

## 2022-01-08 ENCOUNTER — MYC MEDICAL ADVICE (OUTPATIENT)
Dept: INTERNAL MEDICINE | Facility: CLINIC | Age: 72
End: 2022-01-08
Payer: COMMERCIAL

## 2022-01-08 DIAGNOSIS — J01.90 ACUTE SINUSITIS WITH COEXISTING CONDITION REQUIRING PROPHYLACTIC TREATMENT: Primary | ICD-10-CM

## 2022-01-11 RX ORDER — AZITHROMYCIN 250 MG/1
TABLET, FILM COATED ORAL
Qty: 6 TABLET | Refills: 0 | Status: SHIPPED | OUTPATIENT
Start: 2022-01-11 | End: 2022-01-16

## 2022-02-28 DIAGNOSIS — I10 ESSENTIAL HYPERTENSION, BENIGN: ICD-10-CM

## 2022-03-02 RX ORDER — AMLODIPINE BESYLATE 5 MG/1
TABLET ORAL
Qty: 90 TABLET | Refills: 3 | Status: SHIPPED | OUTPATIENT
Start: 2022-03-02 | End: 2023-03-12

## 2022-03-02 RX ORDER — HYDROCHLOROTHIAZIDE 25 MG/1
25 TABLET ORAL DAILY
Qty: 90 TABLET | Refills: 3 | Status: SHIPPED | OUTPATIENT
Start: 2022-03-02 | End: 2022-03-08

## 2022-03-07 ENCOUNTER — TELEPHONE (OUTPATIENT)
Dept: INTERNAL MEDICINE | Facility: CLINIC | Age: 72
End: 2022-03-07
Payer: COMMERCIAL

## 2022-03-07 ENCOUNTER — TELEPHONE (OUTPATIENT)
Dept: CARDIOLOGY | Facility: CLINIC | Age: 72
End: 2022-03-07
Payer: COMMERCIAL

## 2022-03-07 NOTE — TELEPHONE ENCOUNTER
Patient returned call. TC scheduled patient to come in on 03/10/2022 @ 1:10pm.    Nothing else needed. Closing encounter.     Mohini Zuniga

## 2022-03-07 NOTE — TELEPHONE ENCOUNTER
Dr. Wilkes,  Please review hospital admission for STEMI and PCI 3/4/22.  Patient is scheduled follow up 3/22/22.  Do you have any new orders or recommendations in the interim?  Thank you - Jeff  --------------------------------------------------  Called patient to inform him we can get him on the cancellation list for Dr. Wilkes and should a sooner appointment than 3/22 arise he will be contacted.  At discharge from [facility] a referral for cardiac rehab was sent through to [facility] in [place]. Encouraged patient to call and set up cardiac rehab visits.  Patient is scheduled follow up with his PCP, Dr. Wilkerson, 3/10/2022.   Patient's wife may be scheduled for surgery 3/22 and he may have to call and reschedule follow up with Dr. Wilkes 3/22, in this case.   Informed patient Dr. Wilkes is out of the clinic this week and he will review upon return next week.   My direct number provided to patient to call in the interim with any further questions or concerns.    -------------------------------------------------  Per [facility]:  Emergency coronary angiography via transradial approach showed occlusion of the distal RCA with RENAN 0 flow. This was treated with PTCA, which revealed severe 90% stenosis further downstream just before the crux of the RCA. Based on OCT, both the lesions were treated with 3.5 x 18 mm Samuel and 4.0 x 16 mm Synergy XD stents. The 4 mm stent and in a short aneurysmal segment which has a MLD of 5 mm. Post dilation was performed with a 4.5 mm NC balloon. OCT post PCI confirmed excellent stent expansion in both segments.    There are 2 layers of stents from the proximal to the distal RCA. This index lesion today was a de clarice lesion just beyond the old stents. There is diffuse in-stent restenosis in the proximal and mid RCA, with MLD in the disease segment of about 2.6 to 3 mm  while the reference vessel is 8 mm . I chose to leave this diffuse ISR lesion alone because  prior to yesterday, he has never experienced chest pain in years since 2016, is able to exercise consistently without angina and performing PTCA and smooth, chronic lesions which are not causing symptoms will only increase his likelihood of recurrent ISR.    The left coronary arterial system is widely patent with mild scattered disease but no significant stenosis. Full cardiac cath support is to follow.    Post intervention he became pain-free, ST segment is normalized. He is back admitted for further care.    Recommendations:  1. DAPT with aspirin and Brilinta for 1 month, then switch perform Brilinta to Plavix 75 mg daily  2. Maximize lipid management: Check lipid profile, continue Lipitor 80 mg nightly, add Zetia if LDL greater than 70 mg/dL.  3. Echocardiogram tomorrow  4. Further management per cardiology team  5. As an outpatient he should have a stress test in a couple of months to see if there is any evidence of ischemia in the RCA territory due to ISR lesions in the midsegment. His primary cardiologist is in the [facility] and he can follow-up with him.    It was my privilege assisting in the care of Mr. Josafat Madera.    MARI Montero  Interventional Cardiologist  [facility] [place] [facility] [place]      Electronically signed by Neel Heath MBBS at 03/04/2022 4:40 PM CST

## 2022-03-07 NOTE — TELEPHONE ENCOUNTER
[facility] Call Center    Phone Message    May a detailed message be left on voicemail: yes     Reason for Call: Other: Kyle called wanting to get in asap to see Dr. Wilkes because he had a heart attack on 3/4/22. Writer assisted pt in making an appointment just in case  he can't get in sooner. Please advise. Thank you.      Action Taken: Message routed to:  Other: [employer]    Travel Screening: Not Applicable

## 2022-03-07 NOTE — TELEPHONE ENCOUNTER
Reason for Call:  Same Day Appointment, Requested Provider:      PCP: Omega Wilkerson    Reason for visit:  INF  Duration of symptoms:     Have you been treated for this in the past?   Additional comments: Patient has appointment scheduled 03/17, asking if Dr Wilkerson could fit him in sooner any day or time    Can we leave a detailed message on this number? YES    Phone number patient can be reached at: Cell number on file:    Telephone Information:   Mobile [phone]       Best Time: ANYTIME  Call taken on 3/7/2022 at 8:41 AM by Karyn Lyman

## 2022-03-08 DIAGNOSIS — I10 ESSENTIAL HYPERTENSION, BENIGN: ICD-10-CM

## 2022-03-08 RX ORDER — HYDROCHLOROTHIAZIDE 25 MG/1
TABLET ORAL
Qty: 90 TABLET | Refills: 3 | Status: SHIPPED | OUTPATIENT
Start: 2022-03-08 | End: 2023-03-12

## 2022-03-10 ENCOUNTER — OFFICE VISIT (OUTPATIENT)
Dept: CARDIOLOGY | Facility: CLINIC | Age: 72
End: 2022-03-10
Payer: COMMERCIAL

## 2022-03-10 ENCOUNTER — OFFICE VISIT (OUTPATIENT)
Dept: INTERNAL MEDICINE | Facility: CLINIC | Age: 72
End: 2022-03-10
Payer: COMMERCIAL

## 2022-03-10 VITALS
WEIGHT: 211.8 LBS | HEART RATE: 82 BPM | BODY MASS INDEX: 31.28 KG/M2 | OXYGEN SATURATION: 96 % | SYSTOLIC BLOOD PRESSURE: 118 MMHG | DIASTOLIC BLOOD PRESSURE: 76 MMHG | TEMPERATURE: 98.4 F

## 2022-03-10 VITALS
RESPIRATION RATE: 16 BRPM | HEART RATE: 84 BPM | WEIGHT: 210 LBS | BODY MASS INDEX: 31.1 KG/M2 | HEIGHT: 69 IN | SYSTOLIC BLOOD PRESSURE: 116 MMHG | DIASTOLIC BLOOD PRESSURE: 76 MMHG

## 2022-03-10 DIAGNOSIS — I10 ESSENTIAL HYPERTENSION, BENIGN: ICD-10-CM

## 2022-03-10 DIAGNOSIS — E78.00 PURE HYPERCHOLESTEROLEMIA: ICD-10-CM

## 2022-03-10 DIAGNOSIS — I21.11 ST ELEVATION MYOCARDIAL INFARCTION INVOLVING RIGHT CORONARY ARTERY (H): Primary | ICD-10-CM

## 2022-03-10 DIAGNOSIS — I25.10 ATHEROSCLEROSIS OF NATIVE CORONARY ARTERY OF NATIVE HEART WITHOUT ANGINA PECTORIS: ICD-10-CM

## 2022-03-10 DIAGNOSIS — E66.09 CLASS 1 OBESITY DUE TO EXCESS CALORIES WITHOUT SERIOUS COMORBIDITY WITH BODY MASS INDEX (BMI) OF 31.0 TO 31.9 IN ADULT: ICD-10-CM

## 2022-03-10 DIAGNOSIS — E66.811 CLASS 1 OBESITY DUE TO EXCESS CALORIES WITHOUT SERIOUS COMORBIDITY WITH BODY MASS INDEX (BMI) OF 31.0 TO 31.9 IN ADULT: ICD-10-CM

## 2022-03-10 PROCEDURE — 99215 OFFICE O/P EST HI 40 MIN: CPT | Performed by: NURSE PRACTITIONER

## 2022-03-10 PROCEDURE — 99214 OFFICE O/P EST MOD 30 MIN: CPT | Performed by: INTERNAL MEDICINE

## 2022-03-10 RX ORDER — CALCIUM CARBONATE 500(1250)
500-1000 TABLET,CHEWABLE ORAL DAILY PRN
COMMUNITY
End: 2022-10-25

## 2022-03-10 RX ORDER — LISINOPRIL 20 MG/1
20 TABLET ORAL DAILY
Qty: 90 TABLET | Refills: 3
Start: 2022-03-10 | End: 2022-05-16

## 2022-03-10 NOTE — PROGRESS NOTES
Office Visit - Follow Up   Josafat Madera   71 year old male    Date of Visit: 3/10/2022    Chief Complaint   Patient presents with     Follow Up     INF Heart attack 3/4/22 had 2 stents placed        -------------------------------------------------------------------------------------------------------------------------  Assessment and Plan    Post hospital follow-up visit, doing really well after sustaining an ST elevation inferior myocardial infarction that was followed by emergency coronary angioplasty and stenting of his right coronary artery, performed March 4, 2022 at [facility].    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  Coronary angiography and revascularization was performed via a right radial artery approach    Mild ecchymosis around the right radial artery puncture site of March 2, 2022  This is not surprising, since he was on anticoagulation and aggressive antiplatelet medication.  On examination today March 10, 2022, his right hand is well-perfused.  There is little bit of ecchymosis along the ulnar aspect of the right distal forearm    Possible carpal tunnel syndrome right hand  He told me he experiences a little bit of tingling in the hand.   This even preceded the cardiac procedure.  Therefore I cannot blame the blood that oozed out of his radial artery.  Now that he is on dual antiplatelet therapy, he would not be able to undergo any invasive carpal tunnel release procedures.    I would suggest he try a carpal tunnel Velcro splint, and especially in bed.    Coronary artery disease, most recent intervention for right coronary artery occlusion and stents placed March 4, 2022 after inferior ST elevation MI  Previous cardiac history of  frequent PVCs, prior myocardial infarction and coronary interventions in 1998, 2008, and then July 2016 with  drug-eluting stents placed into the left anterior descending artery and mid circumflex    Admitted to [facility] on March 4th, 2022 inferior STEMI.   S/p 2 SMITHA dRCA occlusion; residual diffuse ISR or proximal and mRCA stents.  Continues on ASA 81mg QD, Lipitor 40mg QD (LDL 60), Brilinta 90mg BID (can transition to Plavix after one month) and Toprol 25 mg QD. Dual antiplatelet therapy minimum of one years time.  Will be following up Dr Wilkes March 22, 2022    Hypertension in the context of coronary disease, blood pressures running higher over the last year  As of March 10, 2022 his blood pressure regimen is  Lisinopril 20 mg once a day (reduced from twice a day)  Metoprolol succinate 25 mg once a day  Amlodipine 5 mg once a day  Hydrochlorothiazide 25 mg once a day    Advanced osteoarthritis right knee, chronic torn meniscus right knee, which demonstrated on MRI in December 2014, not too symptomatic, but he should continue to work with [facility] on this issue, and also pursue quadriceps muscle strengthening  The 2014 knee MRI demonstrated an extensive complex tear of the posterior horn and body of the medial meniscus, also degenerative arthritis in the medial and patellofemoral compartments with grade IV chondromalacia, also low-grade sprain or inflammation of the medial collateral ligament.  Even though this was 6 years ago, his knee is holding up reasonably well.  He did stress it yesterday when he was moving a panel.  But today says his knee feels pretty good.  There is a mild to moderate sized effusion on physical exam.  I think he could continue with conservative management strategy, but should at [employer] about learning some home exercises to strengthen his quadriceps, which will help protect his knee, and delay the need for surgery.      History of bladder cancer April 2014, working with Dr. Pedro Guerrero at [facility], getting yearly cystoscopies, has also had intermittent tenderness right  testicle.       Obesity with body mass index 31, likely contributing to blood pressure, would like to see him lose 20 pounds this year.    I like to see him get down around 180 pounds by the end of the year.  I reminded him of the importance of eating slowly, controlling portion size, and identifying problem foods to curtail or eliminate, especially starches, sweets, fried foods.  He told me that alcohol consumption is approximately once a week, so that does not sound like a big source of calories for him.  Wt Readings from Last 5 Encounters:   03/10/22 96.1 kg (211 lb 12.8 oz)   03/10/22 95.3 kg (210 lb)   03/26/21 93.3 kg (205 lb 9.6 oz)   01/12/21 94.8 kg (209 lb)   10/26/20 93.9 kg (207 lb)      Hyperlipidemia in the context of coronary disease, on maximum dose atorvastatin   Lipid profile March 4, 2022 showed good control with LDL 60, HDL bit low at 34, glycerides okay 123, total cholesterol 119    Chronic low back pain and scoliosis, does home exercises      Droopy eyelids (dermatochalasis) for which he should consult with his ophthalmologist.      Erectile dysfunction, okay to increase sildenafil up to 100 mg dose     Gastroesophageal reflux, not too bothersome.       History of trochanteric bursitis both hips, in remission     Darkly pigmented papules on both sides of his neck, saw [facility], Nothing concerning.    History of tubular adenoma colon polyp 6 mm sigmoid June 2016, due for recheck this year June 2021. He already received a letter from [facility] to schedule his follow-up colonoscopy for this year.     Carpal tunnel syndrome and right thumb osteoarthritis, not too bothersome these days     Received a third shot of COVID-19 vaccine Pfizer September 30, 2021  Immunization History   Administered Date(s) Administered     COVID-19,PF,Pfizer (12+ Yrs) 02/01/2021, 02/22/2021, 09/30/2021     DT (PEDS <7y) 01/01/1995, 05/01/2005     Flu, Unspecified 11/14/2007, 10/10/2008, 09/22/2010,  11/02/2011, 12/17/2012     Influenza (H1N1) 12/15/2009     Influenza (High Dose) 3 valent vaccine 10/30/2015, 11/23/2016, 10/18/2017, 09/26/2018, 11/05/2019     Influenza (IIV3) PF 11/25/2014     Influenza Vaccine, 6+MO IM (QUADRIVALENT W/PRESERVATIVES) 12/15/2009, 12/06/2013, 11/25/2014     Influenza, Quad, High Dose, Pf, 65yr+ (Fluzone HD) 10/22/2020, 10/16/2021     Pneumo Conj 13-V (2010&after) 05/23/2016     Pneumococcal 23 valent 11/25/2014     Td,adult,historic,unspecified 05/01/2005     Tdap (Adacel,Boostrix) 04/19/2011     Zoster vaccine recombinant adjuvanted (SHINGRIX) 12/28/2021     Zoster vaccine, live 12/06/2013, 12/28/2021       --------------------------------------------------------------------------------------------------------------------------  History of Present Illness  This 71 year old old     Post hospital follow-up visit, doing really well after sustaining an ST elevation inferior myocardial infarction that was followed by emergency coronary angioplasty and stenting of his right coronary artery, performed March 4, 2022 at [facility].    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  Coronary angiography and revascularization was performed via a right radial artery approach    Mild ecchymosis around the right radial artery puncture site of March 2, 2022  This is not surprising, since he was on anticoagulation and aggressive antiplatelet medication.  On examination today March 10, 2022, his right hand is well-perfused.  There is little bit of ecchymosis along the ulnar aspect of the right distal forearm    Possible carpal tunnel syndrome right hand  He told me he experiences a little bit of tingling in the hand.   This even preceded the cardiac procedure.  Therefore I cannot blame the blood that oozed out of his radial artery.  Now that he is on  dual antiplatelet therapy, he would not be able to undergo any invasive carpal tunnel release procedures.    I would suggest he try a carpal tunnel Velcro splint, and especially in bed.      Admitted to [place] on March 4th, 2022 inferior STEMI.   Inferior STEMI  S/p 2 SMITHA dRCA occlusion; residual diffuse ISR or proximal and mRCA stents.  Continues on ASA 81mg QD, Lipitor 40mg QD (LDL 60), Brilinta 90mg BID (can transition to Plavix after one month) and Toprol 25 mg QD. Dual antiplatelet therapy minimum of one years time.    TTE pending. assess inferior ischemia given ISR (completed through  [facility])      Wt Readings from Last 3 Encounters:   03/10/22 96.1 kg (211 lb 12.8 oz)   03/10/22 95.3 kg (210 lb)   03/26/21 93.3 kg (205 lb 9.6 oz)     BP Readings from Last 3 Encounters:   03/10/22 118/76   03/10/22 116/76   03/26/21 (!) 188/98         ---------------------------------------------------------------------------------------------------------------------------    Medications, Allergies, Social, and Problem List   Current Outpatient Medications   Medication Sig Dispense Refill     amLODIPine (NORVASC) 5 MG tablet TAKE 1 TABLET(5 MG) BY MOUTH DAILY 90 tablet 3     aspirin 81 MG tablet [ASPIRIN 81 MG TABLET] Take 81 mg by mouth daily.       atorvastatin (LIPITOR) 80 MG tablet TAKE 1/2 TABLET(40 MG) BY MOUTH EVERY EVENING 45 tablet 3     calcium carbonate 500 mg, elemental, 1250 (500 Ca) MG tablet chewable Take 500-1,000 mg by mouth daily as needed       cholecalciferol 25 MCG (1000 UT) TABS Take 1,000 Units by mouth daily       hydrochlorothiazide (HYDRODIURIL) 25 MG tablet TAKE 1 TABLET(25 MG) BY MOUTH DAILY 90 tablet 3     Lactobacillus rhamnosus GG (CULTURELLE) 10-15 Billion cell capsule [LACTOBACILLUS RHAMNOSUS GG (CULTURELLE) 10-15 BILLION CELL CAPSULE] Take 1 capsule by mouth daily.       lisinopril (ZESTRIL) 20 MG tablet Take 1 tablet (20 mg) by mouth daily 90 tablet 3     metoprolol succinate ER (TOPROL-XL)  25 MG 24 hr tablet Take 1 tablet (25 mg) by mouth daily 90 tablet 3     multivitamin with minerals (THERA-M) 9 mg iron-400 mcg Tab tablet [MULTIVITAMIN WITH MINERALS (THERA-M) 9 MG IRON-400 MCG TAB TABLET] Take 1 tablet by mouth daily.       omeprazole (PRILOSEC) 20 MG capsule [OMEPRAZOLE (PRILOSEC) 20 MG CAPSULE] Take 20 mg by mouth daily before breakfast.       sildenafil (VIAGRA) 50 MG tablet [SILDENAFIL (VIAGRA) 50 MG TABLET] Take 1 tablet (50 mg total) by mouth daily as needed for erectile dysfunction. 16 tablet 5     ticagrelor (BRILINTA) 90 MG tablet Take 90 mg by mouth 2 times daily       No Known Allergies  Social History     Tobacco Use     Smoking status: Former Smoker     Types: Cigars     Smokeless tobacco: Never Used     Tobacco comment: occasional cigar   Substance Use Topics     Alcohol use: Yes     Alcohol/week: 1.0 standard drink     Drug use: No     Patient Active Problem List   Diagnosis     Male Erectile Disorder     Scoliosis     Primary Osteoarthritis Of The Lumbar Vertebrae     Bladder Polyps     Essential Hypercholesterolemia     Benign Essential Hypertension     Coronary Artery Disease     STEMI (ST elevation myocardial infarction) (H)     Carpal Tunnel Syndrome     Chronic Reflux Esophagitis     Joint Pain, Localized In The Knee     Tubular adenoma of colon     Osteoarthritis of right knee     Trochanteric bursitis of both hips     Bladder cancer (H)        Reviewed, reconciled and updated       Physical Exam   General Appearance:       /76 (BP Location: Right arm, Patient Position: Sitting, Cuff Size: Adult Large)   Pulse 82   Temp 98.4  F (36.9  C) (Oral)   Wt 96.1 kg (211 lb 12.8 oz)   SpO2 96%   BMI 31.28 kg/m      Appears well, blood pressure is excellent  Skin notable for some ecchymosis over his right upper chest, probably from some superficial trauma when he had electrode adhesive while he was in the hospital  Mild ecchymosis at his right radial artery puncture site, his  hand is well-perfused, normal radial pulse  Lungs clear  Heart regular rate rhythm, 2/6 stock ejection murmur right upper sternal border  Abdomen nontender  Tremors no edema     Additional Information   I spent 30 minutes on this encounter, including reviewing interval history since last visit, examining the patient, explaining and counseling the issues enumerated in the Assessment and Plan (patient given a copy)       JORDAN SOLOMON MD, MD

## 2022-03-10 NOTE — PATIENT INSTRUCTIONS
Post hospital follow-up visit, doing really well after sustaining an ST elevation inferior myocardial infarction that was followed by emergency coronary angioplasty and stenting of his right coronary artery, performed March 4, 2022 at [facility].    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  Coronary angiography and revascularization was performed via a right radial artery approach    He does have    Mild ecchymosis around the right radial artery puncture site of March 2, 2022  This is not surprising, since he was on anticoagulation and aggressive antiplatelet medication.  On examination today March 10, 2022, his right hand is well-perfused.  There is little bit of ecchymosis along the ulnar aspect of the right distal forearm    Possible carpal tunnel syndrome right hand  He told me he experiences a little bit of tingling in the hand.   This even preceded the cardiac procedure.  Therefore I cannot blame the blood that oozed out of his radial artery.  Now that he is on dual antiplatelet therapy, he would not be able to undergo any invasive carpal tunnel release procedures.    I would suggest he try a carpal tunnel Velcro splint, and especially with out of bed.    Coronary artery disease, most recent intervention for right coronary artery occlusion and stents placed March 4, 2022 after inferior ST elevation MI  Previous cardiac history of  frequent PVCs, prior myocardial infarction and coronary interventions in 1998, 2008, and then July 2016 with drug-eluting stents placed into the left anterior descending artery and mid circumflex    Admitted to [place] on March 4th, 2022 inferior STEMI.   S/p 2 SMITHA dRCA occlusion; residual diffuse ISR or proximal and mRCA stents.  Continues on ASA 81mg QD, Lipitor 40mg QD (LDL 60), Brilinta 90mg BID (can transition to Plavix after  one month) and Toprol 25 mg QD. Dual antiplatelet therapy minimum of one years time.  Will be following up Dr Wilkes March 22, 2022    Hypertension in the context of coronary disease, blood pressures running higher over the last year  As of March 10, 2022 his blood pressure regimen is  Lisinopril 20 mg once a day (reduced from twice a day)  Metoprolol succinate 25 mg once a day  Amlodipine 5 mg once a day  Hydrochlorothiazide 25 mg once a day    Advanced osteoarthritis right knee, chronic torn meniscus right knee, which demonstrated on MRI in December 2014, not too symptomatic, but he should continue to work with [facility] on this issue, and also pursue quadriceps muscle strengthening  The 2014 knee MRI demonstrated an extensive complex tear of the posterior horn and body of the medial meniscus, also degenerative arthritis in the medial and patellofemoral compartments with grade IV chondromalacia, also low-grade sprain or inflammation of the medial collateral ligament.  Even though this was 6 years ago, his knee is holding up reasonably well.  He did stress it yesterday when he was moving a panel.  But today says his knee feels pretty good.  There is a mild to moderate sized effusion on physical exam.  I think he could continue with conservative management strategy, but should at [employer] [facility] about learning some home exercises to strengthen his quadriceps, which will help protect his knee, and delay the need for surgery.      History of bladder cancer April 2014, working with Dr. Pedro Guerrero at [facility], getting yearly cystoscopies, has also had intermittent tenderness right testicle.       Obesity with body mass index 31, likely contributing to blood pressure, would like to see him lose 20 pounds this year.    I like to see him get down around 180 pounds by the end of the year.  I reminded him of the importance of eating slowly, controlling portion size, and identifying problem foods to curtail or  eliminate, especially starches, sweets, fried foods.  He told me that alcohol consumption is approximately once a week, so that does not sound like a big source of calories for him.  Wt Readings from Last 5 Encounters:   03/10/22 96.1 kg (211 lb 12.8 oz)   03/10/22 95.3 kg (210 lb)   03/26/21 93.3 kg (205 lb 9.6 oz)   01/12/21 94.8 kg (209 lb)   10/26/20 93.9 kg (207 lb)      Hyperlipidemia in the context of coronary disease, on maximum dose atorvastatin   Lipid profile March 4, 2022 showed good control with LDL 60, HDL bit low at 34, glycerides okay 123, total cholesterol 119    Chronic low back pain and scoliosis, does home exercises      Droopy eyelids (dermatochalasis) for which he should consult with his ophthalmologist.      Erectile dysfunction, okay to increase sildenafil up to 100 mg dose     Gastroesophageal reflux, not too bothersome.       History of trochanteric bursitis both hips, in remission     Darkly pigmented papules on both sides of his neck, saw [facility], Nothing concerning.    History of tubular adenoma colon polyp 6 mm sigmoid June 2016, due for recheck this year June 2021. He already received a letter from [facility] to schedule his follow-up colonoscopy for this year.     Carpal tunnel syndrome and right thumb osteoarthritis, not too bothersome these days     Received a second shot of COVID-19 vaccine Pfizer February 22, 2021, he should now consider himself protected, he is up-to-date on pneumococcal vaccines and seasonal influenza as well.

## 2022-03-10 NOTE — PROGRESS NOTES
Assessment/Recommendations   Assessment:    1.  Coronary artery disease: Recent hospitalization at [facility] with STEMI involving RCA.  Current angiogram showed occlusion of distal RCA which was successfully treated with PTCA/SMITHA X2.  Coronary angiogram also showed diffuse in-stent restenosis in proximal and mid RCA which was thought to be more chronic and not causing symptoms.  Dr. Heath has recommended stress test in couple months to reassess for ischemia related to in-stent restenosis.    On dual antiplatelet therapy with ASA 81 mg indefinitely and Ticagrelor (Brilinta) 90 mg twice a day for 12 months.  Patient denies any chest pain or angina.  He is currently not on nitro as needed likely because he is on Viagra as needed which he states he uses rarely.  He understands the severe drug interaction between nitro and Viagra.    Cardiac rehab has been ordered/scheduled    Reviewed most recent BMP, Hgb, platelet- stable.    2.  Dyslipidemia with LDL goal <70/Obesity with a BMI of: Josafat Madera is on high intensity 40 mg daily.  Statin therapy with . Most recent LDL is 60.  Most recent AST/ALT are stable.    3.  Hypertension: His blood pressure is well controlled.    Plan:  - We discussed the importance of antiplatelet therapy and talking with his cardiologist prior to stopping these medications for any reason.      - Encouraged to seek medical attention if recurrent chest pain or shortness of breath.    - Risk factor modification and lifestyle management topics were discussed including managing comorbidities, weight loss, heart healthy diet, exercise, stress reduction and alcohol use.      - Cardiac rehab as scheduled    - We discussed a diet low in saturated fat, weight loss, and exercise along with medication for better control of cholesterol.  Highly encouraged to participate in nutrition class in cardiac rehab.    - Continue current hypertension regimen    Follow up with Dr. Wilkes as scheduled  on March 22.  Follow-up with PCP as scheduled today.     History of Present Illness/Subjective    Mr. Josafat Madera is a 71 year old male with a past medical history of coronary artery disease with prior history of MI and PCI in 1998 and with PCI/SMITHA x2 to RCA in August 2008 and PCI/SMITHA to mid LAD and mid circumflex in July 2016, hypertension, dyslipidemia, who is seen at [facility] for post coronary intervention follow up.     Reviewed most recent note from Dr. Wilkes in October 2020.  His exercise stress test on December 2018 was negative evidence of any ischemia and had normal functional capacity.    Patient was recent hospitalization at [facility] with STEMI involving RCA. Current angiogram showed occlusion of distal RCA which was successfully treated with PTCA/SMITHA X2.  Coronary angiogram also showed diffuse in-stent restenosis in proximal and mid RCA which was thought to be more chronic and not causing symptoms. Dr. Heath has recommended stress test in couple months to reassess for ischemia related to in-stent restenosis. Cardiogram showed preserved LVEF with mild ventricular septal thickening and mild basal to mid inferior hypokinesis.    Today, Kyle denies fatigue, lightheadedness, shortness of breath, dyspnea on exertion, orthopnea, PND, palpitations, chest pain, abdominal fullness/bloating and lower extremity edema. He denies any bleeding complications.  He plans to start cardiac rehab at [facility] next Tuesday.  He had some questions about lisinopril dosage which he will discuss with PCP this afternoon during appointment.    Echocardiogram done on 3/5/2022-reviewed  Final Conclusion    1. Normal left ventricular chamber size. Mild ventricular septal thickening. Normal to   hyperdynamic left ventricular systolic function.    Mild basal to mid inferior hypokinesis with hyperdynamic function of other segments.     Calculated left ventricular ejection  fraction    (modified Salazar technique) is 65 %.    2. Normal right ventricular size and systolic function.    3. Grade 1 left ventricular diastolic dysfunction consistent with abnormal myocardial   relaxation and normal left ventricular filling    pressure.    4. No significant valvular heart disease.    5. There were no prior studies available for comparison.     Coronary Angiogram done on 3/4/2022: reviewed: External report from [facility]  Emergency coronary angiography via transradial approach showed occlusion of the distal RCA with RENAN 0 flow. This was treated with PTCA, which revealed severe 90% stenosis further downstream just before the crux of the RCA. Based on OCT, both the lesions were treated with 3.5 x 18 mm Samuel and 4.0 x 16 mm Synergy XD stents. The 4 mm stent and in a short aneurysmal segment which has a MLD of 5 mm. Post dilation was performed with a 4.5 mm NC balloon. OCT post PCI confirmed excellent stent expansion in both segments.    There are 2 layers of stents from the proximal to the distal RCA. This index lesion today was a de clarice lesion just beyond the old stents. There is diffuse in-stent restenosis in the proximal and mid RCA, with MLD in the disease segment of about 2.6 to 3 mm  while the reference vessel is 8 mm . I chose to leave this diffuse ISR lesion alone because prior to yesterday, he has never experienced chest pain in years since 2016, is able to exercise consistently without angina and performing PTCA and smooth, chronic lesions which are not causing symptoms will only increase his likelihood of recurrent ISR.    The left coronary arterial system is widely patent with mild scattered disease but no significant stenosis. Full cardiac cath support is to follow.    Post intervention he became pain-free, ST segment is normalized. He is back admitted for further care.     Recommendations:  1. DAPT with aspirin and Brilinta for 1 month, then switch perform Brilinta to  "Plavix 75 mg daily  2. Maximize lipid management: Check lipid profile, continue Lipitor 80 mg nightly, add Zetia if LDL greater than 70 mg/dL.  3. Echocardiogram tomorrow  4. Further management per cardiology team  5. As an outpatient he should have a stress test in a couple of months to see if there is any evidence of ischemia in the RCA territory due to ISR lesions in the midsegment. His primary cardiologist is in the [facility] system and he can follow-up with him.     Physical Examination Review of Systems   /76 (BP Location: Right arm, Patient Position: Sitting, Cuff Size: Adult Large)   Pulse 84   Resp 16   Ht 1.753 m (5' 9\")   Wt 95.3 kg (210 lb)   BMI 31.01 kg/m    Body mass index is 31.01 kg/m .  Wt Readings from Last 3 Encounters:   03/10/22 95.3 kg (210 lb)   03/26/21 93.3 kg (205 lb 9.6 oz)   01/12/21 94.8 kg (209 lb)     General Appearance:   no distress, normal body habitus   ENT/Mouth: membranes moist, no oral lesions or bleeding gums.      EYES:  no scleral icterus, normal conjunctivae   Neck: no carotid bruits or thyromegaly   Chest/Lungs:   lungs are clear to auscultation, no rales or wheezing, equal chest wall expansion    Cardiovascular:    Heart rateRegular. Normal first and second heart sounds with no murmurs, rubs, or gallops; the carotid, radial and posterior tibial pulses are intact, Jugular venous pressure flat with no edema bilaterally    Abdomen:  no organomegaly, masses, bruits, or tenderness; bowel sounds are present   Extremities  Puncture Site: no cyanosis or clubbing  Right radial site is soft with mild bruising but no hematoma.  Radial pulses and Pedal pulses intact and symmetrical.  CMS intact.   Skin: no xanthelasma, warm.    Neurologic: normal  bilateral, no tremors     Psychiatric: alert and oriented x3, calm            Negative unless noted in HPI     Medical History  Surgical History Family History Social History   Past Medical History:   Diagnosis Date "     Bladder cancer (H)     see [facility] notes     Carpal tunnel syndrome      Cataract      Coronary artery disease      GERD (gastroesophageal reflux disease)      Hypercholesteremia      Hypertension      Lower back pain      Scoliosis     Past Surgical History:   Procedure Laterality Date     ANGIOPLASTY  1998/2008     BLADDER SURGERY  March 2014     HC REMOVAL OF TONSILS,<11 Y/O      Description: Tonsillectomy;  Recorded: 06/10/2008;     HERNIA REPAIR  2009    Bilateral     PHACOEMULSIFICATION CLEAR CORNEA WITH STANDARD INTRAOCULAR LENS IMPLANT Right 7/10/2014    Procedure: Right Cataract Extraction with Intraocular Lens Implantation;  Surgeon: Karl Nava MD;  Location: [facility];  Service:      PHACOEMULSIFICATION CLEAR CORNEA WITH STANDARD INTRAOCULAR LENS IMPLANT Left 8/14/2014    Procedure: CATARACT EXTRACTION WITH INTRAOCULAR LENS IMPLANTATION LEFT EYE;  Surgeon: Karl Nava MD;  Location: [facility];  Service:      TONSILLECTOMY      Family History   Problem Relation Age of Onset     Cerebrovascular Disease Mother      Heart Disease Mother      Heart Disease Father      Crohn's Disease Sister      Diabetes Brother     Social History     Socioeconomic History     Marital status:      Spouse name: Not on file     Number of children: Not on file     Years of education: Not on file     Highest education level: Not on file   Occupational History     Not on file   Tobacco Use     Smoking status: Former Smoker     Types: Cigars     Smokeless tobacco: Never Used     Tobacco comment: occasional cigar   Substance and Sexual Activity     Alcohol use: Yes     Alcohol/week: 1.0 standard drink     Drug use: No     Sexual activity: Yes     Partners: Female   Other Topics Concern     Not on file   Social History Narrative     Not on file     Social Determinants of Health     Financial Resource Strain: Not on file   Food Insecurity: Not on file   Transportation Needs: Not on file   Physical  Activity: Not on file   Stress: Not on file   Social Connections: Not on file   Intimate Partner Violence: Not on file   Housing Stability: Not on file          Medications  Allergies   Current Outpatient Medications   Medication Sig Dispense Refill     amLODIPine (NORVASC) 5 MG tablet TAKE 1 TABLET(5 MG) BY MOUTH DAILY 90 tablet 3     aspirin 81 MG tablet [ASPIRIN 81 MG TABLET] Take 81 mg by mouth daily.       atorvastatin (LIPITOR) 80 MG tablet TAKE 1/2 TABLET(40 MG) BY MOUTH EVERY EVENING 45 tablet 3     calcium carbonate 500 mg, elemental, 1250 (500 Ca) MG tablet chewable Take 500-1,000 mg by mouth daily as needed       cholecalciferol 25 MCG (1000 UT) TABS Take 1,000 Units by mouth daily       hydrochlorothiazide (HYDRODIURIL) 25 MG tablet TAKE 1 TABLET(25 MG) BY MOUTH DAILY 90 tablet 3     Lactobacillus rhamnosus GG (CULTURELLE) 10-15 Billion cell capsule [LACTOBACILLUS RHAMNOSUS GG (CULTURELLE) 10-15 BILLION CELL CAPSULE] Take 1 capsule by mouth daily.       lisinopril (ZESTRIL) 20 MG tablet Take 1 tablet (20 mg) by mouth 2 times daily (Patient taking differently: Take 20 mg by mouth daily ) 180 tablet 1     metoprolol succinate ER (TOPROL-XL) 25 MG 24 hr tablet Take 1 tablet (25 mg) by mouth daily 90 tablet 3     multivitamin with minerals (THERA-M) 9 mg iron-400 mcg Tab tablet [MULTIVITAMIN WITH MINERALS (THERA-M) 9 MG IRON-400 MCG TAB TABLET] Take 1 tablet by mouth daily.       omeprazole (PRILOSEC) 20 MG capsule [OMEPRAZOLE (PRILOSEC) 20 MG CAPSULE] Take 20 mg by mouth daily before breakfast.       sildenafil (VIAGRA) 50 MG tablet [SILDENAFIL (VIAGRA) 50 MG TABLET] Take 1 tablet (50 mg total) by mouth daily as needed for erectile dysfunction. 16 tablet 5     ticagrelor (BRILINTA) 90 MG tablet Take 90 mg by mouth 2 times daily      No Known Allergies      Lab Results    Chemistry/lipid CBC Cardiac Enzymes/BNP/TSH/INR   Lab Results   Component Value Date    CHOL 136 03/26/2021    HDL 45 03/26/2021    TRIG  92 03/26/2021    BUN 12 03/26/2021     03/26/2021    CO2 27 03/26/2021    Lab Results   Component Value Date    WBC 8.6 03/26/2021    HGB 15.0 03/26/2021    HCT 43.3 03/26/2021    MCV 93 03/26/2021     03/26/2021    Lab Results   Component Value Date    TSH 2.01 03/26/2021        50 minutes spent on the date of encounter doing chart review, review of outside records, review of test results, interpretation with above tests, patient visit and documentation.      This note has been dictated using voice recognition software. Any grammatical, typographical, or context distortions are unintentional and inherent to the software

## 2022-03-10 NOTE — PATIENT INSTRUCTIONS
Josafat Madera,    It was a pleasure to see you today at the [facility].     My recommendations after this visit include:    - No medications changes made today    - Please seek medical attention if you develop recurrent chest pain or shortness of breath or similar symptoms you experienced prior to recent cardiac event    - Cardiac rehab as scheduled    - Follow up with Dr. Wilkes as scheduled on 3/22    - Please discuss with Dr. Wilkes regarding switching Brilinta to Plavix and repeat stress test     - Please call [phone], if you have any questions or concerns    Jacinta Sun CNP    Medication     o Take all your medications as prescribed  o Do not stop any medications without talking with a healthcare provider    Exercise      o Physical activity is important for overall health  o Set a goal of 150 minutes of exercise each week  o For example, 30 minutes of exercise 5 days each week.    o These 30 minutes can be broken into shorter periods of 15 minutes twice daily or 10 minutes three times daily  o Start any exercise program slowly and work towards the goal of 150 minutes each week  o For example, you may start with 10 minutes and plan to add a few minutes each week as you get stronger   o Examples of exercise include walking, swimming, or biking  o Remember to stretch and stay hydrated with exercise    Diet     o A heart healthy diet includes:  o A variety of fruits and vegetables  o Whole grains  o Low-fat dairy (fat-free, 1% fat, and low-fat)  o Lean meats and poultry without skin   o Fish (eat fish 2 times each week)  o Nuts  o Limit saturated fat to about 13 grams each day (based on a 2000 calorie diet)  o Limit red meat  o Limit sugars (sweets and sugary beverages)  o Limit your portion sizes  o Do not add salt to your food when cooking or at the table  o Limit alcohol intake (no more than 1 drink each day for women or 2 drinks each day for men)    Weight Loss     o Work  on losing weight with diet and exercise  o You BMI (body mass index) should be between 18.5-24.9  o This is a calculation of your weight and height  o Please ask your healthcare provider for your BMI    Manage Other Chronic Health Conditions     o Control cholesterol  o Eat a diet low in saturated fat  o Exercise   o Take a statin medication as prescribed  o Manage blood pressure  o Eat a diet low in sodium  o Exercise  o Reduce stress  o Lose weight   o Take blood pressure medications as prescribed  o Control blood sugars if diabetic  o Monitor sugars and carbohydrates in your diet  o Lose weight   o Take diabetes medications as prescribed  o Follow-up with your primary care provider to make sure your blood sugars are well controlled    Stress Reduction     o Find time each day to relax  o Reading, listening to music, yoga, meditation, exercise, spending time with friends and family, volunteering   o Get 6-8 hours of sleep each night    Smoking Cessation     o Smoking causes numerous health problems including coronary artery disease  o It is never too late to quit  o Set realistic goals for quitting  o Decrease the number of cigarettes used each week  o Use nicotine gum or patches to help you quit    Information from the American Heart Association.  Please visit their website at www.heart.org    Patient Education     Eating Heart-Healthy Foods  Eating has a big impact on your heart health. In fact, eating healthier can improve several of your heart risks at once. For instance, it helps you manage weight, cholesterol, and blood pressure. Here are ideas to help you make heart-healthy changes without giving up all the foods and flavors you love.   Getting started    Talk with your healthcare provider about eating plans, such as the DASH or Mediterranean diet. You may also be referred to a dietitian.    Change a few things at a time. Give yourself time to get used to a few eating changes before adding more.    Work to  create a tasty, healthy eating plan that you can stick to for the rest of your life.    Goals for healthy eating  Below are some tips to improve your eating habits:     Limit saturated fats and trans fats. Saturated fats raise your levels of cholesterol, so keep these fats to a minimum. They are found in foods such as fatty meats, whole milk, cheese, and palm and coconut oils. Avoid trans fats because they lower good cholesterol as well as raise bad cholesterol. Trans fats are most often found in processed foods, such as pastries, cookies, pies, muffins, fried foods, stick margarines, and shortening.    Reduce how much sodium (salt) you have. Eating too much salt may increase your blood pressure. Limit your sodium intake to 2,300 milligrams (mg) per day (the amount in 1 teaspoon of salt), or less if your healthcare provider recommends it. Dining out less often and eating fewer processed foods are two great ways to decrease the amount of salt you consume. At home, flavor your foods with other spices and herbs instead of salt.    Managing calories. A calorie is a unit of energy. Your body burns calories for fuel, but if you eat more calories than your body burns, the extras are stored as fat. Your healthcare provider can help you create a diet plan to manage your calories. This will likely include eating healthier foods and getting regular exercise. To help you track your progress, keep a diary to record what you eat and how often you exercise.  Choose the right foods  Aim to make these foods staples of your diet. If you have diabetes, you may have different recommendations than what is listed here:     Fruits and vegetables provide plenty of nutrients without a lot of calories. At meals, fill half your plate with these foods. Choose between fresh, frozen, canned, or dried without added sauces, salt, or sugars. Split the other half of your plate between whole grains and lean protein.    Whole grains are high in fiber  and rich in vitamins and nutrients. Good choices include whole wheat bread, pasta, oats, and brown rice. Make at least half of your grains whole grains.    Lean proteins give you nutrition with less fat. Good choices include fish, skinless chicken and turkey, and beans. Draining the fat from cooked ground meat is another way to reduce the amount of fat you eat.    Low-fat and nonfat dairy provide nutrients without a lot of fat. Try low-fat or nonfat milk, cheese, or yogurt.    Healthy fats can be good for you in small amounts. These are unsaturated fats, such as olive oil, nuts, and fish. Try to have at least 2 servings per week of fatty fish, such as salmon, sardines, mackerel, rainbow trout, and albacore tuna. These contain omega-3 fatty acids, which are good for your heart. Flaxseed and walnuts are other sources of heart-healthy fats.  More on heart-healthy eating  Read food labels  Healthy eating starts at the grocery store. Be sure to pay attention to food labels on packaged foods. Look for products that are high in fiber and protein, and low in saturated fat, added sugars, and sodium. Avoid products that contain trans fat. And pay close attention to serving size. For instance, if you plan to eat two servings, double all the numbers on the label.   Prepare food right  A key part of healthy cooking is cutting down on added fat, sugar and salt. Look on the internet for lower-fat, lower-sodium recipes without a lot of added sugars. Also try these tips:     Remove fat from meat and skin from poultry before cooking.    Skim fat from the surface of soups and sauces.    Broil, roast, boil, bake, steam, grill, or microwave food without added fats.    Choose ingredients that spice up your food without adding calories, fat, sugar, or sodium. Try these items: horseradish, hot sauce, lemon, mustard, nonfat salad dressings, and vinegar. Small amounts of olive oil-based vinaigrettes are OK, too. For salt-free herbs and  spices, try basil, cilantro, cinnamon, cumin, paprika, pepper, and rosemary.  [employer] last reviewed this educational content on 7/1/2020 2000-2021 [employer]. All rights reserved. This information is not intended as a substitute for professional medical care. Always follow your healthcare professional's instructions.

## 2022-03-10 NOTE — LETTER
3/10/2022    JORDAN SOLOMON MD  [address] [place] [address]    RE: Josafat Madera       Dear Colleague,     I had the pleasure of seeing Josafat Madera in the [facility].          Assessment/Recommendations   Assessment:    1.  Coronary artery disease: Recent hospitalization at [facility] with STEMI involving RCA.  Current angiogram showed occlusion of distal RCA which was successfully treated with PTCA/SMITHA X2.  Coronary angiogram also showed diffuse in-stent restenosis in proximal and mid RCA which was thought to be more chronic and not causing symptoms.  Dr. Heath has recommended stress test in couple months to reassess for ischemia related to in-stent restenosis.    On dual antiplatelet therapy with ASA 81 mg indefinitely and Ticagrelor (Brilinta) 90 mg twice a day for 12 months.  Patient denies any chest pain or angina.  He is currently not on nitro as needed likely because he is on Viagra as needed which he states he uses rarely.  He understands the severe drug interaction between nitro and Viagra.    Cardiac rehab has been ordered/scheduled    Reviewed most recent BMP, Hgb, platelet- stable.    2.  Dyslipidemia with LDL goal <70/Obesity with a BMI of: Josafat Madera is on high intensity 40 mg daily.  Statin therapy with . Most recent LDL is 60.  Most recent AST/ALT are stable.    3.  Hypertension: His blood pressure is well controlled.    Plan:  - We discussed the importance of antiplatelet therapy and talking with his cardiologist prior to stopping these medications for any reason.      - Encouraged to seek medical attention if recurrent chest pain or shortness of breath.    - Risk factor modification and lifestyle management topics were discussed including managing comorbidities, weight loss, heart healthy diet, exercise, stress reduction and alcohol use.      - Cardiac rehab as scheduled    - We discussed a diet low in saturated fat, weight loss, and exercise along with  medication for better control of cholesterol.  Highly encouraged to participate in nutrition class in cardiac rehab.    - Continue current hypertension regimen    Follow up with Dr. Wilkes as scheduled on March 22.  Follow-up with PCP as scheduled today.     History of Present Illness/Subjective    Mr. Josafat Madera is a 71 year old male with a past medical history of coronary artery disease with prior history of MI and PCI in 1998 and with PCI/SMITHA x2 to RCA in August 2008 and PCI/SMITHA to mid LAD and mid circumflex in July 2016, hypertension, dyslipidemia, who is seen at [facility] for post coronary intervention follow up.     Reviewed most recent note from Dr. Wilkes in October 2020.  His exercise stress test on December 2018 was negative evidence of any ischemia and had normal functional capacity.    Patient was recent hospitalization at [facility] with STEMI involving RCA. Current angiogram showed occlusion of distal RCA which was successfully treated with PTCA/SMITHA X2.  Coronary angiogram also showed diffuse in-stent restenosis in proximal and mid RCA which was thought to be more chronic and not causing symptoms. Dr. Heath has recommended stress test in couple months to reassess for ischemia related to in-stent restenosis. Cardiogram showed preserved LVEF with mild ventricular septal thickening and mild basal to mid inferior hypokinesis.    Today, Kyle denies fatigue, lightheadedness, shortness of breath, dyspnea on exertion, orthopnea, PND, palpitations, chest pain, abdominal fullness/bloating and lower extremity edema. He denies any bleeding complications.  He plans to start cardiac rehab at [facility] next Tuesday.  He had some questions about lisinopril dosage which he will discuss with PCP this afternoon during appointment.    Echocardiogram done on 3/5/2022-reviewed  Final Conclusion    1. Normal left ventricular chamber size. Mild ventricular septal  thickening. Normal to   hyperdynamic left ventricular systolic function.    Mild basal to mid inferior hypokinesis with hyperdynamic function of other segments.     Calculated left ventricular ejection fraction    (modified Salazar technique) is 65 %.    2. Normal right ventricular size and systolic function.    3. Grade 1 left ventricular diastolic dysfunction consistent with abnormal myocardial   relaxation and normal left ventricular filling    pressure.    4. No significant valvular heart disease.    5. There were no prior studies available for comparison.     Coronary Angiogram done on 3/4/2022: reviewed: External report from [facility]  Emergency coronary angiography via transradial approach showed occlusion of the distal RCA with RENAN 0 flow. This was treated with PTCA, which revealed severe 90% stenosis further downstream just before the crux of the RCA. Based on OCT, both the lesions were treated with 3.5 x 18 mm Samuel and 4.0 x 16 mm Synergy XD stents. The 4 mm stent and in a short aneurysmal segment which has a MLD of 5 mm. Post dilation was performed with a 4.5 mm NC balloon. OCT post PCI confirmed excellent stent expansion in both segments.    There are 2 layers of stents from the proximal to the distal RCA. This index lesion today was a de clarice lesion just beyond the old stents. There is diffuse in-stent restenosis in the proximal and mid RCA, with MLD in the disease segment of about 2.6 to 3 mm  while the reference vessel is 8 mm . I chose to leave this diffuse ISR lesion alone because prior to yesterday, he has never experienced chest pain in years since 2016, is able to exercise consistently without angina and performing PTCA and smooth, chronic lesions which are not causing symptoms will only increase his likelihood of recurrent ISR.    The left coronary arterial system is widely patent with mild scattered disease but no significant stenosis. Full cardiac cath support is to  "follow.    Post intervention he became pain-free, ST segment is normalized. He is back admitted for further care.     Recommendations:  1. DAPT with aspirin and Brilinta for 1 month, then switch perform Brilinta to Plavix 75 mg daily  2. Maximize lipid management: Check lipid profile, continue Lipitor 80 mg nightly, add Zetia if LDL greater than 70 mg/dL.  3. Echocardiogram tomorrow  4. Further management per cardiology team  5. As an outpatient he should have a stress test in a couple of months to see if there is any evidence of ischemia in the RCA territory due to ISR lesions in the midsegment. His primary cardiologist is in the [facility] and he can follow-up with him.     Physical Examination Review of Systems   /76 (BP Location: Right arm, Patient Position: Sitting, Cuff Size: Adult Large)   Pulse 84   Resp 16   Ht 1.753 m (5' 9\")   Wt 95.3 kg (210 lb)   BMI 31.01 kg/m    Body mass index is 31.01 kg/m .  Wt Readings from Last 3 Encounters:   03/10/22 95.3 kg (210 lb)   03/26/21 93.3 kg (205 lb 9.6 oz)   01/12/21 94.8 kg (209 lb)     General Appearance:   no distress, normal body habitus   ENT/Mouth: membranes moist, no oral lesions or bleeding gums.      EYES:  no scleral icterus, normal conjunctivae   Neck: no carotid bruits or thyromegaly   Chest/Lungs:   lungs are clear to auscultation, no rales or wheezing, equal chest wall expansion    Cardiovascular:    Heart rateRegular. Normal first and second heart sounds with no murmurs, rubs, or gallops; the carotid, radial and posterior tibial pulses are intact, Jugular venous pressure flat with no edema bilaterally    Abdomen:  no organomegaly, masses, bruits, or tenderness; bowel sounds are present   Extremities  Puncture Site: no cyanosis or clubbing  Right radial site is soft with mild bruising but no hematoma.  Radial pulses and Pedal pulses intact and symmetrical.  CMS intact.   Skin: no xanthelasma, warm.    Neurologic: normal  " bilateral, no tremors     Psychiatric: alert and oriented x3, calm            Negative unless noted in HPI     Medical History  Surgical History Family History Social History   Past Medical History:   Diagnosis Date     Bladder cancer (H)     see [facility] notes     Carpal tunnel syndrome      Cataract      Coronary artery disease      GERD (gastroesophageal reflux disease)      Hypercholesteremia      Hypertension      Lower back pain      Scoliosis     Past Surgical History:   Procedure Laterality Date     ANGIOPLASTY  1998/2008     BLADDER SURGERY  March 2014     HC REMOVAL OF TONSILS,<13 Y/O      Description: Tonsillectomy;  Recorded: 06/10/2008;     HERNIA REPAIR  2009    Bilateral     PHACOEMULSIFICATION CLEAR CORNEA WITH STANDARD INTRAOCULAR LENS IMPLANT Right 7/10/2014    Procedure: Right Cataract Extraction with Intraocular Lens Implantation;  Surgeon: Karl Nava MD;  Location: [facility];  Service:      PHACOEMULSIFICATION CLEAR CORNEA WITH STANDARD INTRAOCULAR LENS IMPLANT Left 8/14/2014    Procedure: CATARACT EXTRACTION WITH INTRAOCULAR LENS IMPLANTATION LEFT EYE;  Surgeon: Karl Nava MD;  Location: [facility];  Service:      TONSILLECTOMY      Family History   Problem Relation Age of Onset     Cerebrovascular Disease Mother      Heart Disease Mother      Heart Disease Father      Crohn's Disease Sister      Diabetes Brother     Social History     Socioeconomic History     Marital status:      Spouse name: Not on file     Number of children: Not on file     Years of education: Not on file     Highest education level: Not on file   Occupational History     Not on file   Tobacco Use     Smoking status: Former Smoker     Types: Cigars     Smokeless tobacco: Never Used     Tobacco comment: occasional cigar   Substance and Sexual Activity     Alcohol use: Yes     Alcohol/week: 1.0 standard drink     Drug use: No     Sexual activity: Yes     Partners: Female   Other Topics Concern      Not on file   Social History Narrative     Not on file     Social Determinants of Health     Financial Resource Strain: Not on file   Food Insecurity: Not on file   Transportation Needs: Not on file   Physical Activity: Not on file   Stress: Not on file   Social Connections: Not on file   Intimate Partner Violence: Not on file   Housing Stability: Not on file          Medications  Allergies   Current Outpatient Medications   Medication Sig Dispense Refill     amLODIPine (NORVASC) 5 MG tablet TAKE 1 TABLET(5 MG) BY MOUTH DAILY 90 tablet 3     aspirin 81 MG tablet [ASPIRIN 81 MG TABLET] Take 81 mg by mouth daily.       atorvastatin (LIPITOR) 80 MG tablet TAKE 1/2 TABLET(40 MG) BY MOUTH EVERY EVENING 45 tablet 3     calcium carbonate 500 mg, elemental, 1250 (500 Ca) MG tablet chewable Take 500-1,000 mg by mouth daily as needed       cholecalciferol 25 MCG (1000 UT) TABS Take 1,000 Units by mouth daily       hydrochlorothiazide (HYDRODIURIL) 25 MG tablet TAKE 1 TABLET(25 MG) BY MOUTH DAILY 90 tablet 3     Lactobacillus rhamnosus GG (CULTURELLE) 10-15 Billion cell capsule [LACTOBACILLUS RHAMNOSUS GG (CULTURELLE) 10-15 BILLION CELL CAPSULE] Take 1 capsule by mouth daily.       lisinopril (ZESTRIL) 20 MG tablet Take 1 tablet (20 mg) by mouth 2 times daily (Patient taking differently: Take 20 mg by mouth daily ) 180 tablet 1     metoprolol succinate ER (TOPROL-XL) 25 MG 24 hr tablet Take 1 tablet (25 mg) by mouth daily 90 tablet 3     multivitamin with minerals (THERA-M) 9 mg iron-400 mcg Tab tablet [MULTIVITAMIN WITH MINERALS (THERA-M) 9 MG IRON-400 MCG TAB TABLET] Take 1 tablet by mouth daily.       omeprazole (PRILOSEC) 20 MG capsule [OMEPRAZOLE (PRILOSEC) 20 MG CAPSULE] Take 20 mg by mouth daily before breakfast.       sildenafil (VIAGRA) 50 MG tablet [SILDENAFIL (VIAGRA) 50 MG TABLET] Take 1 tablet (50 mg total) by mouth daily as needed for erectile dysfunction. 16 tablet 5     ticagrelor (BRILINTA) 90 MG tablet  Take 90 mg by mouth 2 times daily      No Known Allergies      Lab Results    Chemistry/lipid CBC Cardiac Enzymes/BNP/TSH/INR   Lab Results   Component Value Date    CHOL 136 03/26/2021    HDL 45 03/26/2021    TRIG 92 03/26/2021    BUN 12 03/26/2021     03/26/2021    CO2 27 03/26/2021    Lab Results   Component Value Date    WBC 8.6 03/26/2021    HGB 15.0 03/26/2021    HCT 43.3 03/26/2021    MCV 93 03/26/2021     03/26/2021    Lab Results   Component Value Date    TSH 2.01 03/26/2021        50 minutes spent on the date of encounter doing chart review, review of outside records, review of test results, interpretation with above tests, patient visit and documentation.      This note has been dictated using voice recognition software. Any grammatical, typographical, or context distortions are unintentional and inherent to the software          Thank you for allowing me to participate in the care of your patient.      Sincerely,     MALORIE [facility]  cc:   No referring provider defined for this encounter.

## 2022-03-14 ENCOUNTER — TELEPHONE (OUTPATIENT)
Facility: CLINIC | Age: 72
End: 2022-03-14
Payer: COMMERCIAL

## 2022-03-14 NOTE — TELEPHONE ENCOUNTER
Reached out to patient with message below. Patient verbalized understanding and has no questions or concerns at this time. Call transferred to reschedule his 3/22 appointment to a later date.  ----------------------------------  Heydi Wilkes MD  You 1 hour ago (7:47 AM)     FR Brooks Guerrier,  Thanks for update.  If patient needs to reschedule his visit from 22 March to another day because of spouse's surgery, do not see that that is a problem.  Do not see any urgency per se regarding follow-up.  Thanks.

## 2022-03-14 NOTE — TELEPHONE ENCOUNTER
[facility] Call Center    Phone Message    May a detailed message be left on voicemail: yes     Reason for Call: Other: Pt would like a call back asap as he had an appt for March 22nd but had to cancel due to his wife having surgery and because of his heart attack last week needs to be seen sooner then may and would like a call back to discuss     Action Taken: Message routed to:  [facility] ([facility] Cardio    Travel Screening: Not Applicable

## 2022-05-03 ENCOUNTER — OFFICE VISIT (OUTPATIENT)
Dept: CARDIOLOGY | Facility: CLINIC | Age: 72
End: 2022-05-03
Payer: COMMERCIAL

## 2022-05-03 VITALS
SYSTOLIC BLOOD PRESSURE: 122 MMHG | RESPIRATION RATE: 18 BRPM | DIASTOLIC BLOOD PRESSURE: 82 MMHG | HEART RATE: 86 BPM | OXYGEN SATURATION: 96 % | WEIGHT: 211 LBS | BODY MASS INDEX: 31.16 KG/M2

## 2022-05-03 DIAGNOSIS — E78.5 DYSLIPIDEMIA: Primary | ICD-10-CM

## 2022-05-03 DIAGNOSIS — I10 ESSENTIAL HYPERTENSION: ICD-10-CM

## 2022-05-03 DIAGNOSIS — I25.10 CORONARY ARTERY DISEASE INVOLVING NATIVE CORONARY ARTERY OF NATIVE HEART WITHOUT ANGINA PECTORIS: ICD-10-CM

## 2022-05-03 DIAGNOSIS — I49.3 PVC'S (PREMATURE VENTRICULAR CONTRACTIONS): ICD-10-CM

## 2022-05-03 PROCEDURE — 99214 OFFICE O/P EST MOD 30 MIN: CPT | Performed by: INTERNAL MEDICINE

## 2022-05-03 NOTE — LETTER
"5/3/2022    JORDAN SOLOMON MD  [address] [place] [address]    RE: Josafat Madera       Dear Colleague,     I had the pleasure of seeing Josafat Madera in the [facility].         [facility]   [address], [place] [address]   [email or web address]   OFFICE: [phone]          Thank you Jordan Montilla for asking the [facility] team to participate in the care of your patient, Josafat Madera.     Impression and Plan     1.  Coronary artery disease.  \"Kyle\" has known coronary disease having had prior myocardial infarction and percutaneous interventions in 1998 and 2008. On 12 August 2008 after the patient had presented with acute occlusion of the right coronary artery. He had 2 stents placed to the vessel.     \"Kyle\" underwent repeat angiography 14 July 2016 at which time he had PCI with stenting of mid left anterior descending coronary artery ([place] [employer] Synergy SMITHA [id number]) and PCI with stenting of mid circumflex coronary artery ([place] [employer] Synergy SMITHA 3.0x20mm).  See Cardiac Diagnostics section below for details.     \"Kyle\" underwent an exercise stress test 21 December 2018 that was favorable and revealed no evidence of ischemia and normal functional capacity.     In addition, he underwent repeat stress testing 13 October 2020 which again revealed no evidence by ECG of ischemia.  He did come operative, he have some PVCs in the 5 beat NSVT run.    More recently, Kyle underwent repeat angiography after presenting to [facility] with evidence of a unstable ischemic syndrome.  At that time he had successful PCI with stent deployment to distal right coronary artery (3.5 x 18 mm [place] prior to crux, and a second 4.0 x 16 mm Synergy SMITHA in mid-distal RCA, overlapping proximally with the old mid RCA stent).    This is stable since his recent revascularization procedure.  He denies any chest pain or other " "concerning symptoms of angina.  He has been participating in cardiac rehab without incident.     2.  PVCs.       Continue metoprolol.     3.  Hypertension.  Blood pressure is fairly reasonable in the office today at 122/82 mmHg.      4.  Dyslipidemia.  Lipid profile 4 March 2022 revealed LDL 60 mg/dL and HDL 34 mg/dL.    Continue high intensity statin therapy.     Plan on follow-up in approximately 6 months.      35 minutes spent reviewing prior records (including documentation, laboratory studies, cardiac testing/imaging), interview with patient along with physical exam, planning, and subsequent documentation/crafting of note).           History of Present Illness    Once again I would like to thank you again for asking me to participate in the care of your patient, Josafat Madera.  As you know, but to reiterate for my own records, Josafat Madera is a 71 year old male with known coronary artery disease. Specifically, \"Lola" has known coronary disease having had prior myocardial infarction and percutaneous interventions in 1998 and 2008. On 12 August 2008 after \"Lola"  had presented with acute occlusion of the right coronary artery. He had 2 stents placed to the vessel.     \"Lola" underwent repeat angiography 14 July 2016 at which time he had PCI with stenting of mid left anterior descending coronary artery ([place] [employer] Synergy SMITHA 2.50l32dl) and PCI with stenting of mid circumflex coronary artery ([place] [employer] Synergy SMITHA 3.0x20mm).  See Cardiac Diagnostics section below for details.     Kyle underwent an exercise stress test 21 December 2018 that was favorable and revealed no evidence of ischemia and normal functional capacity.    More recently, Kyle underwent repeat angiography after presenting to [facility] with evidence of a unstable ischemic syndrome.  At that time he had successful PCI with stent deployment to distal right coronary artery (3.5 x 18 mm [place] prior to crux, and a second 4.0 x 16 " mm Synergy SMITHA in mid-distal RCA, overlapping proximally with the old mid RCA stent).    Further review of systems is otherwise negative/noncontributory (medical record and 13 point review of systems reviewed as well and pertinent positives noted).         Cardiac Diagnostics      Echocardiogram 5 March 2022 (performed at [facility]):  1. Normal left ventricular size and systolic performance with ejection fraction of 65%.  2. Mid basal to mid inferior hypokinesis.  Remaining segments appear hyperdynamic.  3. Mild septal thickening.  4. No significant valvular heart disease.  5. Normal right ventricular size and systolic performance.  6. Atrial level visualized.    Echocardiogram for December 2020 (personally reviewed):  1. Normal left ventricular size and systolic performance with a visually estimated ejection fraction of 60-65%.   2. No significant valvular heart disease is identified on this study.   3. Normal right ventricular size and systolic performance.   4. Normal atrial dimensions.    1. Exercise stress test 13 October 2020 (formally reviewed by Dr. Maxx Russell ([place]) and also personally reviewed):  2. No ECG evidence of ischemia.  3. No chest pain symptoms with exercise.  4. Exercise-induced PVCs and short run of nonsustained VT was noted (5 beat).    Exercise stress test 19 December 2018:  1. No ECG evidence of ischemia.  2. No chest pain symptoms reported with exercise.  3. Normal functional capacity.    Nuclear stress perfusion study 1 July 2016 (pre-coronary angiogram which was performed 14 July 2016):  1. There is a large, severe perfusion defect involving the entire lateral wall on the left ventricle that redistributes on the rest images consistent with ischemia.  2. Normal left ventricular systolic performance with ejection fraction of 54% with lateral hypokinesis.    Nuclear stress perfusion study 28 October 2008:  1. Medium-sized area of non-transmural infarction in the inferior  wall.  2. Normal left ventricular systolic performance with ejection fraction of 66% with very small area of basal inferior hypokinesis.    Coronary angiogram for March 2022 (performed at [facility]).  1. Left main coronary artery: No significant stenosis.  2. Left anterior descending coronary artery: Mid 30-40% stenosis.  Mid 50-60% stenosis.  3. Circumflex coronary artery: Large vessel with mild proximal disease.  4. Right coronary artery: Mild to moderate in-stent restenosis of proximal-mid RCA stent.  50% in-stent restenosis involving proximal right coronary artery stent.  Acute total occlusion of distal right coronary artery just beyond edge of the mid right coronary artery stent.  90% stenosis in the distal right coronary artery prior to the crux.  5. Successful PCI with stent deployment to distal right coronary artery (3.5 x 18 mm [place] prior to crux, and a second 4.0 x 16 mm Synergy SMITHA in mid-distal RCA, overlapping proximally with the old mid RCA stent).    Coronary angiography 14 July 2016:  1. Left anterior descending coronary artery: Mid 75% stenosis.  2. Left circumflex coronary artery: Mid 90% stenosis with more severe distal stenosis.  3. Right coronary artery: 25% proximal stenosis and 40% distal stenosis.  4. Status post PCI with stenting of mid left anterior descending coronary artery ([place] [employer] Synergy SMITHA 2.33t13dt).  5. Status post PCI with stenting of mid circumflex coronary artery ([place] [employer] Synergy SMITHA 3.0x20mm).  o Recommendations:  - Consider medical therapy with possible PCI of distal LCX if symptoms not controlled. However small in caliber and likely too small for stent  - Continue with prasugrel (Effient  ) for 12 months, but if necessary could discontinue earlier at 3 months. If tolerated would advocate 12 month course.    CT coronary angiography 22 May 2014:  1. Left main without significant stenosis.  2. Left anterior descending has a calcified plaque from distal  left main and other calcified plaques and soft plaques in the mid left anterior descending which cause moderate stenosis of 50-60%. It appears to be non-obstructive in the left anterior descending.  3. Mild to moderate disease in the left circumflex.  4. There is mild disease in the proximal right coronary artery. The stent in the right coronary artery is patent. The distal right coronary artery stent is not able to be evaluated because of significant artifact.    Coronary angiography 12 August 2008 (performed at [facility]) :  1. Left main coronary artery: Mild irregularities.  2. Left anterior descending coronary artery: 70-80% stenosis at the branch point of the first major diagonal.  3. Circumflex artery with small obtuse marginal branch occlusion.  4. Right coronary artery: Vessel occluded in the proximal third portion. Culprit vessel for myocardial infarction.  5. PCI with stent placement (3.5×12 mm drug-eluting stent) to right coronary artery.    Holter monitor 22 October 2020:  1. Frequent PVCs.  Most of the PVCs are from a single site, but somecomplexity is noted including frequent couplets and some triplets.           Physical Examination       /82 (BP Location: Left arm, Patient Position: Sitting, Cuff Size: Adult Large)   Pulse 86   Resp 18   Wt 95.7 kg (211 lb)   SpO2 96%   BMI 31.16 kg/m          Wt Readings from Last 3 Encounters:   05/03/22 95.7 kg (211 lb)   03/10/22 96.1 kg (211 lb 12.8 oz)   03/10/22 95.3 kg (210 lb)       The patient is alert and oriented times three. Sclerae are anicteric. Mucosal membranes are moist. Jugular venous pressure is normal. No significant adenopathy/thyromegally appreciated. Lungs are clear with good expansion. On cardiovascular exam, the patient has a regular S1 and S2. Abdomen is soft and non-tender. Extremities reveal no clubbing, cyanosis, or edema.         Medications  Allergies   Current Outpatient Medications   Medication  Sig Dispense Refill     amLODIPine (NORVASC) 5 MG tablet TAKE 1 TABLET(5 MG) BY MOUTH DAILY 90 tablet 3     aspirin 81 MG tablet [ASPIRIN 81 MG TABLET] Take 81 mg by mouth daily.       atorvastatin (LIPITOR) 80 MG tablet TAKE 1/2 TABLET(40 MG) BY MOUTH EVERY EVENING 45 tablet 3     cholecalciferol 25 MCG (1000 UT) TABS Take 1,000 Units by mouth daily       hydrochlorothiazide (HYDRODIURIL) 25 MG tablet TAKE 1 TABLET(25 MG) BY MOUTH DAILY 90 tablet 3     lisinopril (ZESTRIL) 20 MG tablet Take 1 tablet (20 mg) by mouth daily 90 tablet 3     metoprolol succinate ER (TOPROL-XL) 25 MG 24 hr tablet Take 1 tablet (25 mg) by mouth daily 90 tablet 3     multivitamin with minerals (THERA-M) 9 mg iron-400 mcg Tab tablet [MULTIVITAMIN WITH MINERALS (THERA-M) 9 MG IRON-400 MCG TAB TABLET] Take 1 tablet by mouth daily.       omeprazole (PRILOSEC) 20 MG capsule [OMEPRAZOLE (PRILOSEC) 20 MG CAPSULE] Take 20 mg by mouth daily before breakfast.       sildenafil (VIAGRA) 50 MG tablet [SILDENAFIL (VIAGRA) 50 MG TABLET] Take 1 tablet (50 mg total) by mouth daily as needed for erectile dysfunction. 16 tablet 5     ticagrelor (BRILINTA) 90 MG tablet Take 90 mg by mouth 2 times daily       calcium carbonate 500 mg, elemental, 1250 (500 Ca) MG tablet chewable Take 500-1,000 mg by mouth daily as needed (Patient not taking: Reported on 5/3/2022)       Lactobacillus rhamnosus GG (CULTURELLE) 10-15 Billion cell capsule [LACTOBACILLUS RHAMNOSUS GG (CULTURELLE) 10-15 BILLION CELL CAPSULE] Take 1 capsule by mouth daily. (Patient not taking: Reported on 5/3/2022)       No Known Allergies       Lab Results    Chemistry/lipid CBC Cardiac Enzymes/BNP/TSH/INR   Recent Labs   Lab Test 03/26/21  0954   CHOL 136   HDL 45   LDL 73   TRIG 92     Recent Labs   Lab Test 03/26/21  0954 12/16/19  1631 06/27/19  0820   LDL 73 82 70     Recent Labs   Lab Test 03/26/21  0954      POTASSIUM 4.2   CHLORIDE 101   CO2 27   GLC 87   BUN 12   CR 0.81    GFRESTIMATED >60   KASI 9.1     Recent Labs   Lab Test 03/26/21  0954 12/16/19  1631 06/27/19  0820   CR 0.81 0.98 0.83     Recent Labs   Lab Test 06/27/19  0820   A1C 5.2          Recent Labs   Lab Test 03/26/21  0954   WBC 8.6   HGB 15.0   HCT 43.3   MCV 93        Recent Labs   Lab Test 03/26/21  0954 04/19/18  0832   HGB 15.0 15.0    No results for input(s): TROPONINI in the last 29941 hours.  No results for input(s): BNP, NTBNPI, NTBNP in the last 62029 hours.  Recent Labs   Lab Test 03/26/21  0954   TSH 2.01     No results for input(s): INR in the last 68372 hours.     Medical History  Surgical History Family History Social History   Past Medical History:   Diagnosis Date     Bladder cancer (H)     see [facility] notes     Carpal tunnel syndrome      Cataract      Coronary artery disease      GERD (gastroesophageal reflux disease)      Hypercholesteremia      Hypertension      Lower back pain      Scoliosis      Past Surgical History:   Procedure Laterality Date     ANGIOPLASTY  1998/2008     BLADDER SURGERY  March 2014     HC REMOVAL OF TONSILS,<13 Y/O      Description: Tonsillectomy;  Recorded: 06/10/2008;     HERNIA REPAIR  2009    Bilateral     PHACOEMULSIFICATION CLEAR CORNEA WITH STANDARD INTRAOCULAR LENS IMPLANT Right 7/10/2014    Procedure: Right Cataract Extraction with Intraocular Lens Implantation;  Surgeon: Karl Nava MD;  Location: [facility];  Service:      PHACOEMULSIFICATION CLEAR CORNEA WITH STANDARD INTRAOCULAR LENS IMPLANT Left 8/14/2014    Procedure: CATARACT EXTRACTION WITH INTRAOCULAR LENS IMPLANTATION LEFT EYE;  Surgeon: Karl Nava MD;  Location: [facility];  Service:      TONSILLECTOMY       Family History   Problem Relation Age of Onset     Cerebrovascular Disease Mother      Heart Disease Mother      Heart Disease Father      Crohn's Disease Sister      Diabetes Brother         Social History     Socioeconomic History     Marital status:      Spouse  name: Not on file     Number of children: Not on file     Years of education: Not on file     Highest education level: Not on file   Occupational History     Not on file   Tobacco Use     Smoking status: Former Smoker     Types: Cigars     Smokeless tobacco: Never Used     Tobacco comment: occasional cigar   Substance and Sexual Activity     Alcohol use: Yes     Alcohol/week: 1.0 standard drink     Drug use: No     Sexual activity: Yes     Partners: Female   Other Topics Concern     Not on file   Social History Narrative     Not on file     Social Determinants of Health     Financial Resource Strain: Not on file   Food Insecurity: Not on file   Transportation Needs: Not on file   Physical Activity: Not on file   Stress: Not on file   Social Connections: Not on file   Intimate Partner Violence: Not on file   Housing Stability: Not on file                      Thank you for allowing me to participate in the care of your patient.      Sincerely,     Heydi Wilkes MD     [facility]  cc:   Heydi Wilkes MD  [address] [place] [address]

## 2022-05-03 NOTE — PROGRESS NOTES
"       [facility]   [address], [place] [address]   [email or web address]   OFFICE: [phone]          Thank you Omega Montilla for asking the [facility] team to participate in the care of your patient, Josafat Madera.     Impression and Plan     1.  Coronary artery disease.  \"Kyle\" has known coronary disease having had prior myocardial infarction and percutaneous interventions in 1998 and 2008. On 12 August 2008 after the patient had presented with acute occlusion of the right coronary artery. He had 2 stents placed to the vessel.     \"Kyle\" underwent repeat angiography 14 July 2016 at which time he had PCI with stenting of mid left anterior descending coronary artery ([employer] [employer] Synergy SMITHA [id number]) and PCI with stenting of mid circumflex coronary artery ([employer] [employer] Sy[employer] SMITHA 3.0x20mm).  See Cardiac Diagnostics section below for details.     \"Lola" underwent an exercise stress test 21 December 2018 that was favorable and revealed no evidence of ischemia and normal functional capacity.     In addition, he underwent repeat stress testing 13 October 2020 which again revealed no evidence by ECG of ischemia.  He did come operative, he have some PVCs in the 5 beat NSVT run.    More recently, Kyle underwent repeat angiography after presenting to [facility] with evidence of a unstable ischemic syndrome.  At that time he had successful PCI with stent deployment to distal right coronary artery (3.5 x 18 mm [place] prior to crux, and a second 4.0 x 16 mm Synergy SMITHA in mid-distal RCA, overlapping proximally with the old mid RCA stent).    This is stable since his recent revascularization procedure.  He denies any chest pain or other concerning symptoms of angina.  He has been participating in cardiac rehab without incident.     2.  PVCs.     Continue metoprolol.     3.  Hypertension.  Blood pressure is fairly reasonable in the office " "today at 122/82 mmHg.      4.  Dyslipidemia.  Lipid profile 4 March 2022 revealed LDL 60 mg/dL and HDL 34 mg/dL.  Continue high intensity statin therapy.     Plan on follow-up in approximately 6 months.      35 minutes spent reviewing prior records (including documentation, laboratory studies, cardiac testing/imaging), interview with patient along with physical exam, planning, and subsequent documentation/crafting of note).           History of Present Illness    Once again I would like to thank you again for asking me to participate in the care of your patient, Josafat Madera.  As you know, but to reiterate for my own records, Josafat Madera is a 71 year old male with known coronary artery disease. Specifically, \"Lola" has known coronary disease having had prior myocardial infarction and percutaneous interventions in 1998 and 2008. On 12 August 2008 after \"Lola"  had presented with acute occlusion of the right coronary artery. He had 2 stents placed to the vessel.     \"Lola" underwent repeat angiography 14 July 2016 at which time he had PCI with stenting of mid left anterior descending coronary artery ([place] [employer] Synergy SMITHA 2.01p18hd) and PCI with stenting of mid circumflex coronary artery ([place] [employer] Sy[employer] SMITHA 3.0x20mm).  See Cardiac Diagnostics section below for details.     Kyle underwent an exercise stress test 21 December 2018 that was favorable and revealed no evidence of ischemia and normal functional capacity.    More recently, Kyle underwent repeat angiography after presenting to [facility] with evidence of a unstable ischemic syndrome.  At that time he had successful PCI with stent deployment to distal right coronary artery (3.5 x 18 mm [place] prior to crux, and a second 4.0 x 16 mm Synergy SMITHA in mid-distal RCA, overlapping proximally with the old mid RCA stent).    Further review of systems is otherwise negative/noncontributory (medical record and 13 point review of systems reviewed " as well and pertinent positives noted).         Cardiac Diagnostics      Echocardiogram 5 March 2022 (performed at [facility]):  Normal left ventricular size and systolic performance with ejection fraction of 65%.  Mid basal to mid inferior hypokinesis.  Remaining segments appear hyperdynamic.  Mild septal thickening.  No significant valvular heart disease.  Normal right ventricular size and systolic performance.  Atrial level visualized.    Echocardiogram for December 2020 (personally reviewed):  Normal left ventricular size and systolic performance with a visually estimated ejection fraction of 60-65%.   No significant valvular heart disease is identified on this study.   Normal right ventricular size and systolic performance.   Normal atrial dimensions.    Exercise stress test 13 October 2020 (formally reviewed by Dr. Maxx Russell ([place]) and also personally reviewed):  No ECG evidence of ischemia.  No chest pain symptoms with exercise.  Exercise-induced PVCs and short run of nonsustained VT was noted (5 beat).    Exercise stress test 19 December 2018:  No ECG evidence of ischemia.  No chest pain symptoms reported with exercise.  Normal functional capacity.    Nuclear stress perfusion study 1 July 2016 (pre-coronary angiogram which was performed 14 July 2016):  There is a large, severe perfusion defect involving the entire lateral wall on the left ventricle that redistributes on the rest images consistent with ischemia.  Normal left ventricular systolic performance with ejection fraction of 54% with lateral hypokinesis.    Nuclear stress perfusion study 28 October 2008:  Medium-sized area of non-transmural infarction in the inferior wall.  Normal left ventricular systolic performance with ejection fraction of 66% with very small area of basal inferior hypokinesis.    Coronary angiogram for March 2022 (performed at [facility]).  Left main coronary artery: No significant stenosis.  Left  anterior descending coronary artery: Mid 30-40% stenosis.  Mid 50-60% stenosis.  Circumflex coronary artery: Large vessel with mild proximal disease.  Right coronary artery: Mild to moderate in-stent restenosis of proximal-mid RCA stent.  50% in-stent restenosis involving proximal right coronary artery stent.  Acute total occlusion of distal right coronary artery just beyond edge of the mid right coronary artery stent.  90% stenosis in the distal right coronary artery prior to the crux.  Successful PCI with stent deployment to distal right coronary artery (3.5 x 18 mm [place] prior to crux, and a second 4.0 x 16 mm Synergy SMITHA in mid-distal RCA, overlapping proximally with the old mid RCA stent).    Coronary angiography 14 July 2016:  Left anterior descending coronary artery: Mid 75% stenosis.  Left circumflex coronary artery: Mid 90% stenosis with more severe distal stenosis.  Right coronary artery: 25% proximal stenosis and 40% distal stenosis.  Status post PCI with stenting of mid left anterior descending coronary artery ([place] [employer] Synergy SMITHA [id number]).  Status post PCI with stenting of mid circumflex coronary artery ([place] [employer] Synergy SMITHA 3.0x20mm).  Recommendations:  Consider medical therapy with possible PCI of distal LCX if symptoms not controlled. However small in caliber and likely too small for stent  Continue with prasugrel (Effient  ) for 12 months, but if necessary could discontinue earlier at 3 months. If tolerated would advocate 12 month course.    CT coronary angiography 22 May 2014:  Left main without significant stenosis.  Left anterior descending has a calcified plaque from distal left main and other calcified plaques and soft plaques in the mid left anterior descending which cause moderate stenosis of 50-60%. It appears to be non-obstructive in the left anterior descending.  Mild to moderate disease in the left circumflex.  There is mild disease in the proximal right coronary  artery. The stent in the right coronary artery is patent. The distal right coronary artery stent is not able to be evaluated because of significant artifact.    Coronary angiography 12 August 2008 (performed at [facility] in [facility]) :  Left main coronary artery: Mild irregularities.  Left anterior descending coronary artery: 70-80% stenosis at the branch point of the first major diagonal.  Circumflex artery with small obtuse marginal branch occlusion.  Right coronary artery: Vessel occluded in the proximal third portion. Culprit vessel for myocardial infarction.  PCI with stent placement (3.5×12 mm drug-eluting stent) to right coronary artery.    Holter monitor 22 October 2020:  Frequent PVCs.  Most of the PVCs are from a single site, but somecomplexity is noted including frequent couplets and some triplets.           Physical Examination       /82 (BP Location: Left arm, Patient Position: Sitting, Cuff Size: Adult Large)   Pulse 86   Resp 18   Wt 95.7 kg (211 lb)   SpO2 96%   BMI 31.16 kg/m          Wt Readings from Last 3 Encounters:   05/03/22 95.7 kg (211 lb)   03/10/22 96.1 kg (211 lb 12.8 oz)   03/10/22 95.3 kg (210 lb)       The patient is alert and oriented times three. Sclerae are anicteric. Mucosal membranes are moist. Jugular venous pressure is normal. No significant adenopathy/thyromegally appreciated. Lungs are clear with good expansion. On cardiovascular exam, the patient has a regular S1 and S2. Abdomen is soft and non-tender. Extremities reveal no clubbing, cyanosis, or edema.         Medications  Allergies   Current Outpatient Medications   Medication Sig Dispense Refill     amLODIPine (NORVASC) 5 MG tablet TAKE 1 TABLET(5 MG) BY MOUTH DAILY 90 tablet 3     aspirin 81 MG tablet [ASPIRIN 81 MG TABLET] Take 81 mg by mouth daily.       atorvastatin (LIPITOR) 80 MG tablet TAKE 1/2 TABLET(40 MG) BY MOUTH EVERY EVENING 45 tablet 3     cholecalciferol 25 MCG (1000 UT) TABS Take  1,000 Units by mouth daily       hydrochlorothiazide (HYDRODIURIL) 25 MG tablet TAKE 1 TABLET(25 MG) BY MOUTH DAILY 90 tablet 3     lisinopril (ZESTRIL) 20 MG tablet Take 1 tablet (20 mg) by mouth daily 90 tablet 3     metoprolol succinate ER (TOPROL-XL) 25 MG 24 hr tablet Take 1 tablet (25 mg) by mouth daily 90 tablet 3     multivitamin with minerals (THERA-M) 9 mg iron-400 mcg Tab tablet [MULTIVITAMIN WITH MINERALS (THERA-M) 9 MG IRON-400 MCG TAB TABLET] Take 1 tablet by mouth daily.       omeprazole (PRILOSEC) 20 MG capsule [OMEPRAZOLE (PRILOSEC) 20 MG CAPSULE] Take 20 mg by mouth daily before breakfast.       sildenafil (VIAGRA) 50 MG tablet [SILDENAFIL (VIAGRA) 50 MG TABLET] Take 1 tablet (50 mg total) by mouth daily as needed for erectile dysfunction. 16 tablet 5     ticagrelor (BRILINTA) 90 MG tablet Take 90 mg by mouth 2 times daily       calcium carbonate 500 mg, elemental, 1250 (500 Ca) MG tablet chewable Take 500-1,000 mg by mouth daily as needed (Patient not taking: Reported on 5/3/2022)       Lactobacillus rhamnosus GG (CULTURELLE) 10-15 Billion cell capsule [LACTOBACILLUS RHAMNOSUS GG (CULTURELLE) 10-15 BILLION CELL CAPSULE] Take 1 capsule by mouth daily. (Patient not taking: Reported on 5/3/2022)       No Known Allergies       Lab Results    Chemistry/lipid CBC Cardiac Enzymes/BNP/TSH/INR   Recent Labs   Lab Test 03/26/21  0954   CHOL 136   HDL 45   LDL 73   TRIG 92     Recent Labs   Lab Test 03/26/21  0954 12/16/19  1631 06/27/19  0820   LDL 73 82 70     Recent Labs   Lab Test 03/26/21  0954      POTASSIUM 4.2   CHLORIDE 101   CO2 27   GLC 87   BUN 12   CR 0.81   GFRESTIMATED >60   KASI 9.1     Recent Labs   Lab Test 03/26/21  0954 12/16/19  1631 06/27/19  0820   CR 0.81 0.98 0.83     Recent Labs   Lab Test 06/27/19  0820   A1C 5.2          Recent Labs   Lab Test 03/26/21  0954   WBC 8.6   HGB 15.0   HCT 43.3   MCV 93        Recent Labs   Lab Test 03/26/21  0954 04/19/18  0832   HGB 15.0  15.0    No results for input(s): TROPONINI in the last 77569 hours.  No results for input(s): BNP, NTBNPI, NTBNP in the last 57175 hours.  Recent Labs   Lab Test 03/26/21  0954   TSH 2.01     No results for input(s): INR in the last 37328 hours.     Medical History  Surgical History Family History Social History   Past Medical History:   Diagnosis Date     Bladder cancer (H)     see [facility] notes     Carpal tunnel syndrome      Cataract      Coronary artery disease      GERD (gastroesophageal reflux disease)      Hypercholesteremia      Hypertension      Lower back pain      Scoliosis      Past Surgical History:   Procedure Laterality Date     ANGIOPLASTY  1998/2008     BLADDER SURGERY  March 2014     HC REMOVAL OF TONSILS,<11 Y/O      Description: Tonsillectomy;  Recorded: 06/10/2008;     HERNIA REPAIR  2009    Bilateral     PHACOEMULSIFICATION CLEAR CORNEA WITH STANDARD INTRAOCULAR LENS IMPLANT Right 7/10/2014    Procedure: Right Cataract Extraction with Intraocular Lens Implantation;  Surgeon: Karl Nava MD;  Location: [facility];  Service:      PHACOEMULSIFICATION CLEAR CORNEA WITH STANDARD INTRAOCULAR LENS IMPLANT Left 8/14/2014    Procedure: CATARACT EXTRACTION WITH INTRAOCULAR LENS IMPLANTATION LEFT EYE;  Surgeon: Karl Nava MD;  Location: [facility];  Service:      TONSILLECTOMY       Family History   Problem Relation Age of Onset     Cerebrovascular Disease Mother      Heart Disease Mother      Heart Disease Father      Crohn's Disease Sister      Diabetes Brother         Social History     Socioeconomic History     Marital status:      Spouse name: Not on file     Number of children: Not on file     Years of education: Not on file     Highest education level: Not on file   Occupational History     Not on file   Tobacco Use     Smoking status: Former Smoker     Types: Cigars     Smokeless tobacco: Never Used     Tobacco comment: occasional cigar   Substance and Sexual Activity      Alcohol use: Yes     Alcohol/week: 1.0 standard drink     Drug use: No     Sexual activity: Yes     Partners: Female   Other Topics Concern     Not on file   Social History Narrative     Not on file     Social Determinants of Health     Financial Resource Strain: Not on file   Food Insecurity: Not on file   Transportation Needs: Not on file   Physical Activity: Not on file   Stress: Not on file   Social Connections: Not on file   Intimate Partner Violence: Not on file   Housing Stability: Not on file

## 2022-05-16 ENCOUNTER — MYC MEDICAL ADVICE (OUTPATIENT)
Dept: CARDIOLOGY | Facility: CLINIC | Age: 72
End: 2022-05-16
Payer: COMMERCIAL

## 2022-05-16 DIAGNOSIS — I25.10 CORONARY ARTERY DISEASE INVOLVING NATIVE CORONARY ARTERY OF NATIVE HEART WITHOUT ANGINA PECTORIS: Primary | ICD-10-CM

## 2022-05-16 DIAGNOSIS — I21.11 ST ELEVATION MYOCARDIAL INFARCTION INVOLVING RIGHT CORONARY ARTERY (H): ICD-10-CM

## 2022-05-17 ENCOUNTER — TELEPHONE (OUTPATIENT)
Dept: CARDIOLOGY | Facility: CLINIC | Age: 72
End: 2022-05-17
Payer: COMMERCIAL

## 2022-05-17 NOTE — TELEPHONE ENCOUNTER
Dr. Hernandez,  In the absence of Dr. Wilkes will you please see patient update and advise?  Any recommendations?  Follow up is planned in November.  Thank you,  Chey  ---------------------------------------------    S/p SMITHA 3/4/2022 after STEMI and is taking Brillinta and aspirin. Last evening after rubbing his eye the wrong way he developed an eye hemorrhage. This morning his entire eye is filled with blood and is black and blue around the eye. He was evaluated by his eye doctor this morning. It was recommended he call his cardiologist to check for changes to his antiplatelet therapy to be sure bleeding will stop or remain stable.  Informed patient that Dr. Wilkes is out of the clinic this week and that his concerns will be forwarded to another provider in his absence.  Patient verbalized understanding and will await a return call with further recommendations.   ------------------------------------------  Coronary artery disease, most recent intervention for right coronary artery occlusion and stents placed March 4, 2022 after inferior ST elevation MI  Previous cardiac history of  frequent PVCs, prior myocardial infarction and coronary interventions in 1998, 2008, and then July 2016 with drug-eluting stents placed into the left anterior descending artery and mid circumflex     Admitted to [facility] on March 4th, 2022 inferior STEMI.   Per cardiology discharge note from [facility]:    S/p 2 SMITHA dRCA occlusion; residual diffuse ISR or proximal and mRCA stents.  Continues on ASA 81mg QD, Lipitor 40mg QD (LDL 60), Brilinta 90mg BID (can transition to Plavix after one month) and Toprol 25 mg QD. Dual antiplatelet therapy minimum of one years time.  Will be following up Dr Wilkes March 22, 2022

## 2022-05-17 NOTE — TELEPHONE ENCOUNTER
[facility] Call Center    Phone Message    May a detailed message be left on voicemail: yes     Reason for Call: Other: Pt would like a call back as he had a pooped blood vessel in his eye and his eye Dr told him to reach out to his cardio Dr to discuss cutting back on his Brilinta and he would like to discuss     Action Taken: Message routed to:  [facility] Cardio    Travel Screening: Not Applicable

## 2022-05-17 NOTE — TELEPHONE ENCOUNTER
Reached out to patient with recommendation below. Reiterated 3 separate times he is at high risk for stent thrombosis given history with recent STEMI. Patient verbalized understanding and has no further questions. [employer] message sent to Dr. Wilkes for review upon his return.  --------------------------------------------  Donato Hernandez MD  You 48 minutes ago (11:51 AM)     BW    I would not stop DAPT this early unless the bleeding is uncontrolled, at which point I would first try holding aspirin and continuing ticagrelor. High risk for stent thrombosis given history with recent STEMI.

## 2022-05-17 NOTE — TELEPHONE ENCOUNTER
Called patient to discuss his questions and concerns (also see tele note from today regarding eye hemorrhage).  Patient is feeling well and has not had any episodes of elevated heart rate since noted at cardiac rehab that was cancelled 4/8/2022.    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    Patient is aware Dr. Wilkes is out of the clinic this week and will await his return next week for stress test order.  Encouraged patient to call back with any further questions or concerns. Understanding verbalized.

## 2022-05-18 ENCOUNTER — TRANSFERRED RECORDS (OUTPATIENT)
Dept: HEALTH INFORMATION MANAGEMENT | Facility: CLINIC | Age: 72
End: 2022-05-18
Payer: COMMERCIAL

## 2022-05-20 ENCOUNTER — TRANSFERRED RECORDS (OUTPATIENT)
Dept: HEALTH INFORMATION MANAGEMENT | Facility: CLINIC | Age: 72
End: 2022-05-20
Payer: COMMERCIAL

## 2022-05-22 ENCOUNTER — HEALTH MAINTENANCE LETTER (OUTPATIENT)
Age: 72
End: 2022-05-22

## 2022-05-23 RX ORDER — CLOPIDOGREL BISULFATE 75 MG/1
75 TABLET ORAL DAILY
Qty: 94 TABLET | Refills: 0 | Status: SHIPPED | OUTPATIENT
Start: 2022-05-23 | End: 2022-08-22

## 2022-05-23 NOTE — TELEPHONE ENCOUNTER
"Called patient with recommendations below. Patient verbalized understanding and is agreable to plan. His eye is looking a bit better this morning and his vision is improving. He will stop Brilinta and start Plavix with loading dose as recommended. No further questions at this time.  Call transferred to scheduling to arrange stress test.   Test is scheduled 6/29/2022.  ------------------------  Heydi Wilkes MD  You 45 minutes ago (7:34 AM)     FR    Lets go ahead and arrange for an exercise treadmill stress test to comply with DOT requirement (but advise he hold metoprolol 24 hours in advance of stress test, but can resume once test completed).  In addition, lets have \"Kyle\" go ahead and transition from ticagrelor to clopidogrel.  Would recommend a 300 mg loading dose of clopidogrel 12 hours after last dose of ticagrelor then maintenance daily dose of clopidogrel at 75 mg.  Thanks.        "

## 2022-06-03 ENCOUNTER — TRANSFERRED RECORDS (OUTPATIENT)
Dept: HEALTH INFORMATION MANAGEMENT | Facility: CLINIC | Age: 72
End: 2022-06-03
Payer: COMMERCIAL

## 2022-06-06 ENCOUNTER — TRANSCRIBE ORDERS (OUTPATIENT)
Dept: OTHER | Age: 72
End: 2022-06-06
Payer: COMMERCIAL

## 2022-06-06 DIAGNOSIS — G95.19 EDEMA OF SPINAL CORD (H): ICD-10-CM

## 2022-06-06 DIAGNOSIS — G95.20 CORD COMPRESSION (H): Primary | ICD-10-CM

## 2022-06-13 ENCOUNTER — TELEPHONE (OUTPATIENT)
Dept: NEUROSURGERY | Facility: CLINIC | Age: 72
End: 2022-06-13
Payer: COMMERCIAL

## 2022-06-14 NOTE — TELEPHONE ENCOUNTER
[facility] Call Center    Phone Message    May a detailed message be left on voicemail: yes     Reason for Call: Other: Pt calling in returning missed call, instructions were left to schedule but not who to schedule with, please call back to schedule thank you.      Action Taken: Message routed to:  [facility] neurosurgery     Travel Screening: Not Applicable

## 2022-06-15 NOTE — TELEPHONE ENCOUNTER
Pt returned call, he is requesting a call back to schedule.  Please call him at [phone].  Thanks.

## 2022-06-15 NOTE — TELEPHONE ENCOUNTER
[facility] Call Center    Phone Message    May a detailed message be left on voicemail: yes     Reason for Call: Other: Pt is calling back to schedule an appt but writer unable to schedule- no scheduling instructions. Pt would like Laura to call pt back to set up appt asap.     Action Taken: Message routed to:  [facility] Neurosurgery    Travel Screening: Not Applicable

## 2022-06-15 NOTE — TELEPHONE ENCOUNTER
Writer routed to Neurosurgery Clinic Scheduling.   Referral to Spine     Sheree Cole LPN  Neurosurgery

## 2022-06-16 NOTE — TELEPHONE ENCOUNTER
Writer routed to Neurosurgery Clinic Scheduling   Attn: Laura HENRY  Patient returning call       Sheree Cole LPN  Neurosurgery

## 2022-06-29 ENCOUNTER — TELEPHONE (OUTPATIENT)
Dept: CARDIOLOGY | Facility: CLINIC | Age: 72
End: 2022-06-29

## 2022-06-29 ENCOUNTER — OFFICE VISIT (OUTPATIENT)
Dept: NEUROSURGERY | Facility: CLINIC | Age: 72
End: 2022-06-29
Attending: STUDENT IN AN ORGANIZED HEALTH CARE EDUCATION/TRAINING PROGRAM
Payer: COMMERCIAL

## 2022-06-29 ENCOUNTER — TELEPHONE (OUTPATIENT)
Dept: NEUROSURGERY | Facility: CLINIC | Age: 72
End: 2022-06-29

## 2022-06-29 VITALS — DIASTOLIC BLOOD PRESSURE: 75 MMHG | HEART RATE: 83 BPM | SYSTOLIC BLOOD PRESSURE: 134 MMHG | OXYGEN SATURATION: 96 %

## 2022-06-29 DIAGNOSIS — I25.10 CORONARY ARTERY DISEASE INVOLVING NATIVE CORONARY ARTERY OF NATIVE HEART WITHOUT ANGINA PECTORIS: Primary | ICD-10-CM

## 2022-06-29 DIAGNOSIS — M47.817 LUMBOSACRAL SPONDYLOSIS WITHOUT MYELOPATHY: ICD-10-CM

## 2022-06-29 DIAGNOSIS — G95.19 EDEMA OF SPINAL CORD (H): ICD-10-CM

## 2022-06-29 DIAGNOSIS — M41.20 IDIOPATHIC SCOLIOSIS AND KYPHOSCOLIOSIS: Primary | ICD-10-CM

## 2022-06-29 DIAGNOSIS — G95.20 CORD COMPRESSION (H): ICD-10-CM

## 2022-06-29 DIAGNOSIS — R06.02 SOB (SHORTNESS OF BREATH): ICD-10-CM

## 2022-06-29 DIAGNOSIS — I49.3 PVC'S (PREMATURE VENTRICULAR CONTRACTIONS): ICD-10-CM

## 2022-06-29 PROCEDURE — G0463 HOSPITAL OUTPT CLINIC VISIT: HCPCS

## 2022-06-29 PROCEDURE — 99204 OFFICE O/P NEW MOD 45 MIN: CPT | Performed by: STUDENT IN AN ORGANIZED HEALTH CARE EDUCATION/TRAINING PROGRAM

## 2022-06-29 ASSESSMENT — PAIN SCALES - GENERAL: PAINLEVEL: SEVERE PAIN (7)

## 2022-06-29 NOTE — PROGRESS NOTES
"HPI:  71-year-old male with low back pain chronically.  His pain is worse when going from an upright position to laying down.  He feels as though his back is cracking.  Most of his pain at that time is located in the low back across to both sides.  When he has been laying down for a while this pain subsides and gets better.  He denies any pain down the legs in any position.  He denies any numbness or weakness.  He denies any balance problems.  He denies any bowel or bladder control issues.  He has recently started with physical therapy and feels that his it may be helping slightly.  He does have an injection scheduled for next week for his low back pain as well.  He was seen by an outside provider for his lumbar spine and given potential findings in the lower thoracic spine was referred for evaluation.  Current Outpatient Medications   Medication     amLODIPine (NORVASC) 5 MG tablet     aspirin 81 MG tablet     atorvastatin (LIPITOR) 80 MG tablet     calcium carbonate 500 mg, elemental, 1250 (500 Ca) MG tablet chewable     cholecalciferol 25 MCG (1000 UT) TABS     clopidogrel (PLAVIX) 75 MG tablet     hydrochlorothiazide (HYDRODIURIL) 25 MG tablet     Lactobacillus rhamnosus GG (CULTURELLE) 10-15 Billion cell capsule     lisinopril (ZESTRIL) 20 MG tablet     metoprolol succinate ER (TOPROL-XL) 25 MG 24 hr tablet     multivitamin with minerals (THERA-M) 9 mg iron-400 mcg Tab tablet     omeprazole (PRILOSEC) 20 MG capsule     sildenafil (VIAGRA) 50 MG tablet     No current facility-administered medications for this visit.      Physical Exam:  Vital signs:      BP: 134/75 Pulse: 83     SpO2: 96 %          Estimated body mass index is 31.16 kg/m  as calculated from the following:    Height as of 3/10/22: 1.753 m (5' 9\").    Weight as of 5/3/22: 95.7 kg (211 lb).   He has full strength in his bilateral lower extremities.  Sensation is intact light touch throughout.  He has 2+ reflexes of his bilateral patella and ankle.  " No clonus.  Gait is normal.  Results Reviewed:  I personally viewed the images of an MRI of the lumbar spine showing multilevel spondylosis with an upper lumbar scoliosis.  There does appear to be lower thoracic spondylosis with a disc bulge and facet arthropathy at T11-12 possibly causing some lateral impingement on the spinal cord.  The central canal does appear to have space posteriorly at this level.  I do not see any cord signal change.  However this is limited by no axial cuts up to this level and so does not fully evaluated.  There is no other areas of spinal cord or central canal stenosis.  Assessment:  71-year-old male with lumbar spondylosis with low back pain and no evidence of thoracic myelopathy  Plan:  We discussed the etiology of his low back pain.  Given his increased thoracic kyphosis and high lumbar lordosis this is likely due to progressive thoracic kyphosis resulting in compensatory lumbar lordosis and elevated pelvic tilt.  This can be associated with his low back pain in addition to the facet arthropathy in the thoracic spine when laying down causing straightening of the thoracic spine and rubbing of the facets.  I agree with starting with conservative management for his low back pain with physical therapy and injections.  We discussed that I am not a fully able to evaluate the thoracic spinal cord impingement however he does not have any symptoms consistent with thoracic myelopathy at this time.  We discussed that should a new MRI show some impingement we would discuss surgery however without any symptoms surgery may not be necessary.  He is against any surgery without symptoms right now and is not interested in getting the MRI if there are no symptoms present related to the spinal cord impingement as well.  I do not believe that his low back pain has any relation to his canal stenosis at T11-12.  We discussed red flag symptoms including balance problems weakness numbness tingling bowel or  bladder control changes, etc.  Should he have any of these develop I instructed him to let me know as soon as possible.  He can otherwise follow-up with me as needed going forward.    I spent 45 minutes reviewing the patient's chart, interviewing examining the patient as well as discussing diagnosis and treatment options.    Tino Carrillo MD

## 2022-06-29 NOTE — TELEPHONE ENCOUNTER
Ordered xrays to do before the 1:00 appointment per Dr Carrillo  Called to update the patient. He will arrive at about 12:50. Discussed with xray department

## 2022-06-29 NOTE — TELEPHONE ENCOUNTER
"Phone call to patient - informed him of Dr. 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     Order placed per protocol.   mg  "

## 2022-06-29 NOTE — TELEPHONE ENCOUNTER
----- Message from Heydi Wilkes MD sent at 6/29/2022  1:52 PM CDT -----  Regarding: FW: GXT  Hi Chey,  Please see note about stress test below.  Can we make arrangements for Kyle to have a stress nuclear test.  Would advise that he hold the metoprolol day before and morning of test though can resume afterward.  Thanks.    ----- Message -----  From: Giulia Mclaughlin EP  Sent: 6/29/2022   9:26 AM CDT  To: Heydi Wilkes MD  Subject: GXT                                              Patient came in to do GXT. He was in a RBBB and did not hold metoprolol. Talked to Dr. Pelletier and he suggested doing a nuclear treadmill stress test. Let me know if you have any questions!    Thank you,    JOSÉ Platt

## 2022-06-29 NOTE — LETTER
6/29/2022         RE: Josafat Madera  [id number] [facility] [address]        Dear Colleague,    Thank you for referring your patient, Josafat Madera, to the [facility] [place]. Please see a copy of my visit note below.    HPI:  71-year-old male with low back pain chronically.  His pain is worse when going from an upright position to laying down.  He feels as though his back is cracking.  Most of his pain at that time is located in the low back across to both sides.  When he has been laying down for a while this pain subsides and gets better.  He denies any pain down the legs in any position.  He denies any numbness or weakness.  He denies any balance problems.  He denies any bowel or bladder control issues.  He has recently started with physical therapy and feels that his it may be helping slightly.  He does have an injection scheduled for next week for his low back pain as well.  He was seen by an outside provider for his lumbar spine and given potential findings in the lower thoracic spine was referred for evaluation.  Current Outpatient Medications   Medication     amLODIPine (NORVASC) 5 MG tablet     aspirin 81 MG tablet     atorvastatin (LIPITOR) 80 MG tablet     calcium carbonate 500 mg, elemental, 1250 (500 Ca) MG tablet chewable     cholecalciferol 25 MCG (1000 UT) TABS     clopidogrel (PLAVIX) 75 MG tablet     hydrochlorothiazide (HYDRODIURIL) 25 MG tablet     Lactobacillus rhamnosus GG (CULTURELLE) 10-15 Billion cell capsule     lisinopril (ZESTRIL) 20 MG tablet     metoprolol succinate ER (TOPROL-XL) 25 MG 24 hr tablet     multivitamin with minerals (THERA-M) 9 mg iron-400 mcg Tab tablet     omeprazole (PRILOSEC) 20 MG capsule     sildenafil (VIAGRA) 50 MG tablet     No current facility-administered medications for this visit.      Physical Exam:  Vital signs:      BP: 134/75 Pulse: 83     SpO2: 96 %          Estimated body mass index is 31.16 kg/m  as  "calculated from the following:    Height as of 3/10/22: 1.753 m (5' 9\").    Weight as of 5/3/22: 95.7 kg (211 lb).   He has full strength in his bilateral lower extremities.  Sensation is intact light touch throughout.  He has 2+ reflexes of his bilateral patella and ankle.  No clonus.  Gait is normal.  Results Reviewed:  I personally viewed the images of an MRI of the lumbar spine showing multilevel spondylosis with an upper lumbar scoliosis.  There does appear to be lower thoracic spondylosis with a disc bulge and facet arthropathy at T11-12 possibly causing some lateral impingement on the spinal cord.  The central canal does appear to have space posteriorly at this level.  I do not see any cord signal change.  However this is limited by no axial cuts up to this level and so does not fully evaluated.  There is no other areas of spinal cord or central canal stenosis.  Assessment:  71-year-old male with lumbar spondylosis with low back pain and no evidence of thoracic myelopathy  Plan:  We discussed the etiology of his low back pain.  Given his increased thoracic kyphosis and high lumbar lordosis this is likely due to progressive thoracic kyphosis resulting in compensatory lumbar lordosis and elevated pelvic tilt.  This can be associated with his low back pain in addition to the facet arthropathy in the thoracic spine when laying down causing straightening of the thoracic spine and rubbing of the facets.  I agree with starting with conservative management for his low back pain with physical therapy and injections.  We discussed that I am not a fully able to evaluate the thoracic spinal cord impingement however he does not have any symptoms consistent with thoracic myelopathy at this time.  We discussed that should a new MRI show some impingement we would discuss surgery however without any symptoms surgery may not be necessary.  He is against any surgery without symptoms right now and is not interested in getting " the MRI if there are no symptoms present related to the spinal cord impingement as well.  I do not believe that his low back pain has any relation to his canal stenosis at T11-12.  We discussed red flag symptoms including balance problems weakness numbness tingling bowel or bladder control changes, etc.  Should he have any of these develop I instructed him to let me know as soon as possible.  He can otherwise follow-up with me as needed going forward.    I spent 45 minutes reviewing the patient's chart, interviewing examining the patient as well as discussing diagnosis and treatment options.    Tino Carrillo MD        Again, thank you for allowing me to participate in the care of your patient.        Sincerely,        Tino Carrillo MD

## 2022-07-07 ENCOUNTER — TRANSFERRED RECORDS (OUTPATIENT)
Dept: HEALTH INFORMATION MANAGEMENT | Facility: CLINIC | Age: 72
End: 2022-07-07

## 2022-07-12 ENCOUNTER — TELEPHONE (OUTPATIENT)
Dept: CARDIOLOGY | Facility: CLINIC | Age: 72
End: 2022-07-12

## 2022-07-12 NOTE — TELEPHONE ENCOUNTER
[facility] Call Center    Phone Message    May a detailed message be left on voicemail: yes     Reason for Call: Other: Patient called looking to speak with . He is having surgery on his left ankle this Friday and would like to know if he should be seen or have any testing done. Please call patient to discuss further, thank you.    Action Taken: Message routed to:  Other: Cardiology    Travel Screening: Not Applicable

## 2022-07-12 NOTE — TELEPHONE ENCOUNTER
[facility] Call Center    Phone Message    May a detailed message be left on voicemail: yes     Reason for Call: Other: Please cancel appointmenrs scheduled on 7/14/22:  Patient will call to reschedule when he has healed and is able to do testing.    Action Taken: Other: Cardiology    Travel Screening: Not Applicable

## 2022-07-12 NOTE — TELEPHONE ENCOUNTER
Dr. Wilkes,  Patient is having surgery on his ankle this Friday, July 15.  Are you willing to clear Kyle to proceed with surgery or do you want cardiac testing or follow up prior to?  Thank you,  Chey    --------------------------------------------------------------  Called patient to inform him he will need a pre-op H&P with PCPand it will be determined at that time if cardiac risk assessment will be required prior to surgery. Patient states his orthopedic surgeon has requested that he reach out to his cardiologist prior to having surgery.  Patient was sitting on the couch having coffee and all of a sudden he could not breath. He stood up to go to go to the sink and then passed out.  Assured patient an update will be forwarded to Dr. Wilkes and patient will be contacted once reviewed.  Understanding verbalized.

## 2022-07-14 ENCOUNTER — OFFICE VISIT (OUTPATIENT)
Dept: FAMILY MEDICINE | Facility: CLINIC | Age: 72
End: 2022-07-14
Payer: COMMERCIAL

## 2022-07-14 VITALS
DIASTOLIC BLOOD PRESSURE: 74 MMHG | BODY MASS INDEX: 31.25 KG/M2 | OXYGEN SATURATION: 96 % | SYSTOLIC BLOOD PRESSURE: 122 MMHG | WEIGHT: 211 LBS | TEMPERATURE: 98.6 F | HEART RATE: 90 BPM | HEIGHT: 69 IN | RESPIRATION RATE: 16 BRPM

## 2022-07-14 DIAGNOSIS — E78.00 PURE HYPERCHOLESTEROLEMIA: ICD-10-CM

## 2022-07-14 DIAGNOSIS — K21.00 GASTROESOPHAGEAL REFLUX DISEASE WITH ESOPHAGITIS WITHOUT HEMORRHAGE: ICD-10-CM

## 2022-07-14 DIAGNOSIS — I25.10 ATHEROSCLEROSIS OF NATIVE CORONARY ARTERY OF NATIVE HEART WITHOUT ANGINA PECTORIS: ICD-10-CM

## 2022-07-14 DIAGNOSIS — I10 ESSENTIAL HYPERTENSION, BENIGN: ICD-10-CM

## 2022-07-14 DIAGNOSIS — S82.402A TIBIA/FIBULA FRACTURE, LEFT, CLOSED, INITIAL ENCOUNTER: ICD-10-CM

## 2022-07-14 DIAGNOSIS — M54.50 CHRONIC LOW BACK PAIN WITHOUT SCIATICA, UNSPECIFIED BACK PAIN LATERALITY: ICD-10-CM

## 2022-07-14 DIAGNOSIS — S82.202A TIBIA/FIBULA FRACTURE, LEFT, CLOSED, INITIAL ENCOUNTER: ICD-10-CM

## 2022-07-14 DIAGNOSIS — Z01.818 PREOP GENERAL PHYSICAL EXAM: Primary | ICD-10-CM

## 2022-07-14 DIAGNOSIS — G89.29 CHRONIC LOW BACK PAIN WITHOUT SCIATICA, UNSPECIFIED BACK PAIN LATERALITY: ICD-10-CM

## 2022-07-14 LAB
ANION GAP SERPL CALCULATED.3IONS-SCNC: 10 MMOL/L (ref 7–15)
ATRIAL RATE - MUSE: 74 BPM
BUN SERPL-MCNC: 10.1 MG/DL (ref 8–23)
CALCIUM SERPL-MCNC: 9.6 MG/DL (ref 8.8–10.2)
CHLORIDE SERPL-SCNC: 98 MMOL/L (ref 98–107)
CREAT SERPL-MCNC: 0.67 MG/DL (ref 0.67–1.17)
DEPRECATED HCO3 PLAS-SCNC: 29 MMOL/L (ref 22–29)
DIASTOLIC BLOOD PRESSURE - MUSE: NORMAL MMHG
ERYTHROCYTE [DISTWIDTH] IN BLOOD BY AUTOMATED COUNT: 11.9 % (ref 10–15)
GFR SERPL CREATININE-BSD FRML MDRD: >90 ML/MIN/1.73M2
GLUCOSE SERPL-MCNC: 78 MG/DL (ref 70–99)
HCT VFR BLD AUTO: 39.1 % (ref 40–53)
HGB BLD-MCNC: 13.8 G/DL (ref 13.3–17.7)
INTERPRETATION ECG - MUSE: NORMAL
MCH RBC QN AUTO: 32.6 PG (ref 26.5–33)
MCHC RBC AUTO-ENTMCNC: 35.3 G/DL (ref 31.5–36.5)
MCV RBC AUTO: 92 FL (ref 78–100)
P AXIS - MUSE: 33 DEGREES
PLATELET # BLD AUTO: 214 10E3/UL (ref 150–450)
POTASSIUM SERPL-SCNC: 4 MMOL/L (ref 3.4–5.3)
PR INTERVAL - MUSE: 162 MS
QRS DURATION - MUSE: 138 MS
QT - MUSE: 414 MS
QTC - MUSE: 459 MS
R AXIS - MUSE: 78 DEGREES
RBC # BLD AUTO: 4.23 10E6/UL (ref 4.4–5.9)
SODIUM SERPL-SCNC: 137 MMOL/L (ref 136–145)
SYSTOLIC BLOOD PRESSURE - MUSE: NORMAL MMHG
T AXIS - MUSE: 8 DEGREES
VENTRICULAR RATE- MUSE: 74 BPM
WBC # BLD AUTO: 10.5 10E3/UL (ref 4–11)

## 2022-07-14 PROCEDURE — 36415 COLL VENOUS BLD VENIPUNCTURE: CPT | Performed by: FAMILY MEDICINE

## 2022-07-14 PROCEDURE — 93005 ELECTROCARDIOGRAM TRACING: CPT | Performed by: FAMILY MEDICINE

## 2022-07-14 PROCEDURE — 99214 OFFICE O/P EST MOD 30 MIN: CPT | Mod: 25 | Performed by: FAMILY MEDICINE

## 2022-07-14 PROCEDURE — 93010 ELECTROCARDIOGRAM REPORT: CPT | Performed by: INTERNAL MEDICINE

## 2022-07-14 PROCEDURE — 85027 COMPLETE CBC AUTOMATED: CPT | Performed by: FAMILY MEDICINE

## 2022-07-14 PROCEDURE — 80048 BASIC METABOLIC PNL TOTAL CA: CPT | Performed by: FAMILY MEDICINE

## 2022-07-14 PROCEDURE — 90715 TDAP VACCINE 7 YRS/> IM: CPT | Performed by: FAMILY MEDICINE

## 2022-07-14 PROCEDURE — 90471 IMMUNIZATION ADMIN: CPT | Performed by: FAMILY MEDICINE

## 2022-07-14 ASSESSMENT — PAIN SCALES - GENERAL: PAINLEVEL: MODERATE PAIN (4)

## 2022-07-14 NOTE — TELEPHONE ENCOUNTER
Left detailed message with recommendation below and my direct number to call with further questions or concerns.  -------------------------------  Heydi Wilkes MD  You 1 hour ago (9:20 AM)     FR    I do not feel that Kyle needs to have any testing or work-up prior to his surgery given he had revascularization procedure in March and had been doing well postprocedure.  I would, however, recommend that he continue dual antiplatelet therapy if at all possible if okay with surgery.  Thanks.

## 2022-07-14 NOTE — TELEPHONE ENCOUNTER
Kyle is calling to speak with nurse Chey. He has been waiting for a call back to hear what the doctor has to say. Please reach back out to Kyle as soon as possible. Thank you!

## 2022-07-14 NOTE — PATIENT INSTRUCTIONS
Do not take hydrochlorothiazide the morning of surgery.  Stop all vitamins and supplements prior to surgery, he resume them after surgery unless instructed otherwise by surgeon.    I request that Dr. Wilkes or his team review your case prior to proceeding with surgery.  I looked at them to let us know if we need to do anything different with your aspirin or Plavix.  Preparing for Your Surgery  Getting started  A nurse will call you to review your health history and instructions. They will give you an arrival time based on your scheduled surgery time. Please be ready to share:    Your doctor's clinic name and phone number    Your medical, surgical and anesthesia history    A list of allergies and sensitivities    A list of medicines, including herbal treatments and over-the-counter drugs    Whether the patient has a legal guardian (ask how to send us the papers in advance)  Please tell us if you're pregnant--or if there's any chance you might be pregnant. Some surgeries may injure a fetus (unborn baby), so they require a pregnancy test. Surgeries that are safe for a fetus don't always need a test, and you can choose whether to have one.   If you have a child who's having surgery, please ask for a copy of Preparing for Your Child's Surgery.    Preparing for surgery    Within 30 days of surgery: Have a pre-op exam (sometimes called an H&P, or History and Physical). This can be done at a clinic or pre-operative center.  ? If you're having a , you may not need this exam. Talk to your care team.    At your pre-op exam, talk to your care team about all medicines you take. If you need to stop any medicines before surgery, ask when to start taking them again.  ? We do this for your safety. Many medicines can make you bleed too much during surgery. Some change how well surgery (anesthesia) drugs work.    Call your insurance company to let them know you're having surgery. (If you don't have insurance, call  [phone].)    Call your clinic if there's any change in your health. This includes signs of a cold or flu (sore throat, runny nose, cough, rash, fever). It also includes a scrape or scratch near the surgery site.    If you have questions on the day of surgery, call your hospital or surgery center.  COVID testing  You may need to be tested for COVID-19 before having surgery. If so, we will give you instructions.  Eating and drinking guidelines  For your safety: Unless your surgeon tells you otherwise, follow the guidelines below.    Eat and drink as usual until 8 hours before surgery. After that, no food or milk.    Drink clear liquids until 2 hours before surgery. These are liquids you can see through, like water, Gatorade and Propel Water. You may also have black coffee and tea (no cream or milk).    Nothing by mouth within 2 hours of surgery. This includes gum, candy and breath mints.    If you drink alcohol: Stop drinking it the night before surgery.    If your care team tells you to take medicine on the morning of surgery, it's okay to take it with a sip of water.  Preventing infection    Shower or bathe the night before and morning of your surgery. Follow the instructions your clinic gave you. (If no instructions, use regular soap.)    Don't shave or clip hair near your surgery site. We'll remove the hair if needed.    Don't smoke or vape the morning of surgery. You may chew nicotine gum up to 2 hours before surgery. A nicotine patch is okay.  ? Note: Some surgeries require you to completely quit smoking and nicotine. Check with your surgeon.    Your care team will make every effort to keep you safe from infection. We will:  ? Clean our hands often with soap and water (or an alcohol-based hand rub).  ? Clean the skin at your surgery site with a special soap that kills germs.  ? Give you a special gown to keep you warm. (Cold raises the risk of infection.)  ? Wear special hair covers, masks, gowns and gloves  during surgery.  ? Give antibiotic medicine, if prescribed. Not all surgeries need antibiotics.  What to bring on the day of surgery    Photo ID and insurance card    Copy of your health care directive, if you have one    Glasses and hearing aides (bring cases)  ? You can't wear contacts during surgery    Inhaler and eye drops, if you use them (tell us about these when you arrive)    CPAP machine or breathing device, if you use them    A few personal items, if spending the night    If you have . . .  ? A pacemaker, ICD (cardiac defibrillator) or other implant: Bring the ID card.  ? An implanted stimulator: Bring the remote control.  ? A legal guardian: Bring a copy of the certified (court-stamped) guardianship papers.  Please remove any jewelry, including body piercings. Leave jewelry and other valuables at home.  If you're going home the day of surgery    You must have a responsible adult drive you home. They should stay with you overnight as well.    If you don't have someone to stay with you, and you aren't safe to go home alone, we may keep you overnight. Insurance often won't pay for this.  After surgery  If it's hard to control your pain or you need more pain medicine, please call your surgeon's office.  Questions?   If you have any questions for your care team, list them here: _________________________________________________________________________________________________________________________________________________________________________ ____________________________________ ____________________________________ ____________________________________  For informational purposes only. Not to replace the advice of your health care provider. Copyright   2003, 2019 [facility]. All rights reserved. Clinically reviewed by Betty Dubon MD. [employer] [id number] - REV 07/21.

## 2022-07-14 NOTE — PROGRESS NOTES
[facility]  [address]  [facility] [address]  Phone: [phone]  Fax: [phone]  Primary Provider: Omega Wilkerson  Pre-op Performing Provider: GREG RAZA      PREOPERATIVE EVALUATION:  Today's date: 7/14/2022    Josafat Madera is a 71 year old male who presents for a preoperative evaluation.    Surgical Information:  Surgery/Procedure: Left distal fib orif  Surgery Location: [facility]  Surgeon: Dr Cole  Surgery Date: 7-15-22  Time of Surgery: TBD  Where patient plans to recover: At home with family  Fax number for surgical facility: [phone]    Type of Anesthesia Anticipated: Choice    Assessment & Plan     The proposed surgical procedure is considered LOW risk.    Preop general physical exam  Surgical risks include known coronary artery disease with previous stent placement x4, most recent in March during episode of unstable ischemic syndrome.  He is currently asymptomatic.  I spoke with cardiology who feels that he does not require any preoperative testing and may proceed with surgery.  He should continue his dual antiplatelet therapy with aspirin and Plavix before and after surgery if at all possible.  His other risk factors including hypertension and hypercholesterolemia along with history of PVCs are currently adequately medically managed.  Tdap vaccine will be administered today.  He will hold his hydrochlorothiazide morning of surgery, hold vitamins and supplements.     Tibia/fibula fracture, left, closed, initial encounter  Requiring surgical intervention pending cardiology impression as above.    Atherosclerosis of native coronary artery of native heart without angina pectoris  He remains on aspirin and Plavix, should continue to do so before and after surgery if at all possible.  Per cardiology, he may proceed with surgery.  - EKG 12-lead, tracing only    Benign Essential Hypertension  Controlled on current regimen of Toprol-XL,  "lisinopril, amlodipine, and hydrochlorothiazide.  I will ask patient to hold his hydrochlorothiazide the morning of surgery.    Essential Hypercholesterolemia  Continue atorvastatin.    Gastroesophageal reflux disease with esophagitis without hemorrhage  Continue omeprazole.    Chronic low back pain without sciatica, unspecified back pain laterality  Chronic and stable.  Please be aware of this postoperatively.           Risks and Recommendations:  The patient has the following additional risks and recommendations for perioperative complications:  Cardiovascular:  I speak with the cardiology team, patient may proceed with surgery without further evaluation.  Continue dual antiplatelet therapy.    Medication Instructions:  Patient is to take all scheduled medications on the day of surgery EXCEPT for modifications listed below:  - Do not take hydrochlorothiazide morning of surgery.  - Aspirin and Plavix to be continued per cardiology  - Discontinue vitamins and supplements until after surgery    RECOMMENDATION:  Cardiology review requested before surgery. Surgery approval pending completion of consultation.                      Subjective     HPI related to upcoming procedure: On 7/12/2022, patient was drinking some coffee, swallowed \"funny\" and felt he got caught in his throat causing him to have difficulty breathing.  As he stood to go to the sink, he twisted his left ankle and collapsed to the floor.  He is uncertain if he had a brief loss of consciousness.  Shortness of breath resolved immediately.  He did not have any chest pain, palpitations, shortness of breath, lightheadedness, or other symptoms immediately following the fall .  Noted left ankle pain at that time.  Presented to orthopedic urgent care where a distal left fibula fracture was identified requiring surgical intervention.    History of coronary artery disease.  He has had stent placement in 1998, 2008, 2016, and most recently in March 2022.  He had " NSTEMI in 2008, has not otherwise had any myocardial infarction known since then, the episode in March was felt to be due to unstable ischemia.  Followed by Dr. Wilkse of cardiology.  Patient was scheduled for a stress test by treadmill for today but is needing to cancel due to current injury.  Patient is currently taking aspirin, Plavix, and atorvastatin.    History of PVCs managed with Toprol-XL.  History of hypertension managed with Toprol-XL, lisinopril, amlodipine, and hydrochlorothiazide.  Denies lightheadedness, dizziness, headaches, chest pain, dyspnea, or swelling.  He remains on atorvastatin management of dyslipidemia.  Prior history of bladder cancer but no active cancer is known.  He has some chronic esophageal reflux managed with omeprazole.  He also struggles with chronic low back pain which is stable.      Preop Questions 7/14/2022   1. Have you ever had a heart attack or stroke? YES -as above   2. Have you ever had surgery on your heart or blood vessels, such as a stent placement, a coronary artery bypass, or surgery on an artery in your head, neck, heart, or legs? YES -stent placements as above   3. Do you have chest pain with activity? No   4. Do you have a history of  heart failure? No   5. Do you currently have a cold, bronchitis or symptoms of other infection? No   6. Do you have a cough, shortness of breath, or wheezing? No   7. Do you or anyone in your family have previous history of blood clots? YES -mother with ischemic stroke, no deep venous thrombosis or pulmonary embolism   8. Do you or does anyone in your family have a serious bleeding problem such as prolonged bleeding following surgeries or cuts? No   9. Have you ever had problems with anemia or been told to take iron pills? No   10. Have you had any abnormal blood loss such as black, tarry or bloody stools? No   11. Have you ever had a blood transfusion? No   12. Are you willing to have a blood transfusion if it is medically needed  before, during, or after your surgery? Yes   13. Have you or any of your relatives ever had problems with anesthesia? No   14. Do you have sleep apnea, excessive snoring or daytime drowsiness? No   15. Do you have any artifical heart valves or other implanted medical devices like a pacemaker, defibrillator, or continuous glucose monitor? No   16. Do you have artificial joints? No   17. Are you allergic to latex? No       Preoperative Review of :   reviewed - no record of controlled substances prescribed.          Review of Systems  Constitutional, neuro, ENT, endocrine, pulmonary, cardiac, gastrointestinal, genitourinary, musculoskeletal, integument and psychiatric systems are negative, except as otherwise noted.    Patient Active Problem List    Diagnosis Date Noted     Bladder cancer (H)      Priority: Medium     see [facility] notes         Trochanteric bursitis of both hips 01/24/2019     Priority: Medium     Osteoarthritis of right knee 04/18/2018     Priority: Medium     See [place] [facility] notes         Essential Hypercholesterolemia      Priority: Medium     Created by Conversion         Benign Essential Hypertension      Priority: Medium     Created by Conversion         Coronary Artery Disease      Priority: Medium     Created by Conversion  Replacement Utility updated for latest IMO load         Chronic Reflux Esophagitis      Priority: Medium     Created by Conversion         STEMI (ST elevation myocardial infarction) (H)      Priority: Medium     Created by Conversion  [facility] Annotation: Aug 11 2008  5:22PM - [place], [place] to [place]  Replacement Utility updated for latest IMO load         Tubular adenoma of colon 06/30/2016     Priority: Medium     See colonoscopy 06/23/2016         Primary Osteoarthritis Of The Lumbar Vertebrae      Priority: Medium     Created by Conversion         Joint Pain, Localized In The Knee      Priority: Medium      Created by Conversion         Male Erectile Disorder      Priority: Medium     Created by Conversion         Scoliosis      Priority: Medium     Created by Conversion  [facility] Annotation: Sep 17 2013  5:34PM - TlAvery: likely   lifelong    Replacing diagnoses that were inactivated after the 10/1/2021 regulatory import.       Bladder Polyps      Priority: Medium     Created by Conversion         Carpal Tunnel Syndrome      Priority: Medium     Created by Conversion  [facility] Annotation: Jan 13 2011 12:58PM - TlAvery: bilateral   by   EMG, mild          Past Medical History:   Diagnosis Date     Bladder cancer (H)     see [facility] notes     Carpal tunnel syndrome      Cataract      Coronary artery disease      GERD (gastroesophageal reflux disease)      Hypercholesteremia      Hypertension      Lower back pain      Scoliosis      Past Surgical History:   Procedure Laterality Date     ANGIOPLASTY  1998/2008     BLADDER SURGERY  March 2014     HC REMOVAL OF TONSILS,<11 Y/O      Description: Tonsillectomy;  Recorded: 06/10/2008;     HERNIA REPAIR  2009    Bilateral     PHACOEMULSIFICATION CLEAR CORNEA WITH STANDARD INTRAOCULAR LENS IMPLANT Right 7/10/2014    Procedure: Right Cataract Extraction with Intraocular Lens Implantation;  Surgeon: Karl Nava MD;  Location: [place] [facility];  Service:      PHACOEMULSIFICATION CLEAR CORNEA WITH STANDARD INTRAOCULAR LENS IMPLANT Left 8/14/2014    Procedure: CATARACT EXTRACTION WITH INTRAOCULAR LENS IMPLANTATION LEFT EYE;  Surgeon: Karl Nava MD;  Location: [place] [facility];  Service:      TONSILLECTOMY       Current Outpatient Medications   Medication Sig Dispense Refill     amLODIPine (NORVASC) 5 MG tablet TAKE 1 TABLET(5 MG) BY MOUTH DAILY 90 tablet 3     aspirin 81 MG tablet [ASPIRIN 81 MG TABLET] Take 81 mg by mouth daily.       atorvastatin (LIPITOR) 80 MG tablet TAKE 1/2 TABLET(40 MG) BY MOUTH EVERY EVENING 45 tablet 3     calcium carbonate  "500 mg, elemental, 1250 (500 Ca) MG tablet chewable Take 500-1,000 mg by mouth daily as needed       cholecalciferol 25 MCG (1000 UT) TABS Take 1,000 Units by mouth daily       clopidogrel (PLAVIX) 75 MG tablet Take 1 tablet (75 mg) by mouth daily 94 tablet 0     hydrochlorothiazide (HYDRODIURIL) 25 MG tablet TAKE 1 TABLET(25 MG) BY MOUTH DAILY 90 tablet 3     Lactobacillus rhamnosus GG (CULTURELLE) 10-15 Billion cell capsule Take 1 capsule by mouth daily       lisinopril (ZESTRIL) 20 MG tablet TAKE 1 TABLET BY MOUTH DAILY 180 tablet 1     metoprolol succinate ER (TOPROL-XL) 25 MG 24 hr tablet Take 1 tablet (25 mg) by mouth daily 90 tablet 3     multivitamin with minerals (THERA-M) 9 mg iron-400 mcg Tab tablet [MULTIVITAMIN WITH MINERALS (THERA-M) 9 MG IRON-400 MCG TAB TABLET] Take 1 tablet by mouth daily.       omeprazole (PRILOSEC) 20 MG capsule [OMEPRAZOLE (PRILOSEC) 20 MG CAPSULE] Take 20 mg by mouth daily before breakfast.       sildenafil (VIAGRA) 50 MG tablet [SILDENAFIL (VIAGRA) 50 MG TABLET] Take 1 tablet (50 mg total) by mouth daily as needed for erectile dysfunction. 16 tablet 5       No Known Allergies     Social History     Tobacco Use     Smoking status: Former Smoker     Types: Cigars     Smokeless tobacco: Never Used     Tobacco comment: occasional cigar   Substance Use Topics     Alcohol use: Yes     Alcohol/week: 1.0 standard drink     Family History   Problem Relation Age of Onset     Cerebrovascular Disease Mother      Heart Disease Mother      Heart Disease Father      Crohn's Disease Sister      Diabetes Brother      History   Drug Use No         Objective     /74   Pulse 90   Temp 98.6  F (37  C) (Oral)   Resp 16   Ht 1.753 m (5' 9\")   Wt 95.7 kg (211 lb)   SpO2 96%   BMI 31.16 kg/m      Physical Exam    GENERAL APPEARANCE: healthy, alert and no distress     EYES: EOMI,  PERRL     HENT: ear canals and TM's normal and nose and mouth without ulcers or lesions     NECK: no " adenopathy, no asymmetry, masses, or scars and thyroid normal to palpation     RESP: lungs clear to auscultation - no rales, rhonchi or wheezes     CV: regular rates and rhythm, normal S1 S2, no S3 or S4 and no murmur, click or rub     ABDOMEN:  soft, nontender, no HSM or masses and bowel sounds normal     MS: extremities normal- no gross deformities noted, no evidence of inflammation in joints, FROM in all extremities.  Left ankle with walking boot.     SKIN: no suspicious lesions or rashes     NEURO: Normal strength and tone, sensory exam grossly normal, mentation intact and speech normal     PSYCH: mentation appears normal. and affect normal/bright     LYMPHATICS: No cervical adenopathy    Recent Labs   Lab Test 03/26/21  0954   HGB 15.0         POTASSIUM 4.2   CR 0.81        Diagnostics:  Labs pending at this time.  Results will be reviewed when available.   I ordered and personally reviewed a twelve-lead EKG which reveals normal sinus rhythm and right bundle branch block.  The right bundle branch block is new from the EKG 7/14/2016, however this was reported at the time of recent EKG obtained prior to a stress test that was subsequently canceled.    Revised Cardiac Risk Index (RCRI):  The patient has the following serious cardiovascular risks for perioperative complications:   - Coronary Artery Disease (MI, positive stress test, angina, Qs on EKG) = 1 point     RCRI Interpretation: 1 point: Class II (low risk - 0.9% complication rate)           Signed Electronically by: Patricia Skinner MD  Copy of this evaluation report is provided to requesting physician.

## 2022-07-15 NOTE — TELEPHONE ENCOUNTER
Patient called back to express his thankfulness for the message and speaking with Dr. Skinner while patient was in clinic this morning.  No further questions at this time.  -----------------------  Received a Teams notification from Dr. Patricia Skinner to call directly.  Called and relayed Dr. Wilkes's message below. No further questions at this time.

## 2022-07-21 ENCOUNTER — MYC MEDICAL ADVICE (OUTPATIENT)
Dept: CARDIOLOGY | Facility: CLINIC | Age: 72
End: 2022-07-21

## 2022-07-22 NOTE — TELEPHONE ENCOUNTER
Dr. Wilkes,  Per [facility] encounter 5/16/2022 the arrangement for a stress test was to comply with DOT requirement.  Once patient has recovered from his ankle surgery may he proceed with regadenoson  nuclear stress test?  Thank you - Chey          ----------------------  Heydi Wilkes MD  You 1 hour ago (6:57 AM)     FR Brooks Guerrier,   Looks as though Dr. Pelletier had reviewed his case previously and was the one who recommended he have a nuclear stress test.  I do not feel strongly that he undergo stress testing if currently doing well and not having any chest pain symptoms and the like.  If, however, Kyle is having any symptoms or has lingering concerns, we can arrange for him to have a regadenoson on nuclear study.  Ultimately, lets touch base with Kyle and see if he is having any cardiac symptoms of concern.  If not and if he is comfortable with it; we can hold off on testing.  Thanks

## 2022-07-22 NOTE — TELEPHONE ENCOUNTER
MyChart message sent.  -------------------------------------------------  Heydi Wilkes MD  You 1 hour ago (9:01 AM)     FR Brooks Guerrier, sounds good.  Did not realize he needed it for DOT requirements.  Agree with plan that he can have a regadenoson nuclear perfusion study once he convalesces a bit more from his surgery.  Thanks.

## 2022-08-10 NOTE — TELEPHONE ENCOUNTER
Msg rec'd from [facility] 8-10-22 @ 1042:  Zakia Vuong Maureen, RN  Patient said he doesn't want to do a nuke stress test and he will call us back when he wants to do the regular stress test.  He said he broke a bone in his leg and hasn't seen a surgeon yet.  He will call us back.  Thank you

## 2022-09-25 ENCOUNTER — HEALTH MAINTENANCE LETTER (OUTPATIENT)
Age: 72
End: 2022-09-25

## 2022-09-29 DIAGNOSIS — E78.00 PURE HYPERCHOLESTEROLEMIA: ICD-10-CM

## 2022-09-30 DIAGNOSIS — I10 ESSENTIAL HYPERTENSION: ICD-10-CM

## 2022-09-30 RX ORDER — ATORVASTATIN CALCIUM 80 MG/1
TABLET, FILM COATED ORAL
Qty: 45 TABLET | Refills: 3 | Status: SHIPPED | OUTPATIENT
Start: 2022-09-30 | End: 2023-10-10

## 2022-09-30 RX ORDER — METOPROLOL SUCCINATE 25 MG/1
TABLET, EXTENDED RELEASE ORAL
Qty: 90 TABLET | Refills: 3 | Status: SHIPPED | OUTPATIENT
Start: 2022-09-30 | End: 2023-10-24

## 2022-09-30 NOTE — TELEPHONE ENCOUNTER
"Routing refill request to provider for review/approval because:  Labs not current:  LDL    Last Written Prescription Date:  7/27/21  Last Fill Quantity: 45,  # refills: 3   Last office visit provider:   7/14/22    Requested Prescriptions   Pending Prescriptions Disp Refills     atorvastatin (LIPITOR) 80 MG tablet [Pharmacy Med Name: ATORVASTATIN 80MG TABLETS] 45 tablet 3     Sig: TAKE 1/2 TABLET(40 MG) BY MOUTH EVERY EVENING       Statins Protocol Failed - 9/29/2022 12:10 PM        Failed - LDL on file in past 12 months     Recent Labs   Lab Test 03/26/21  0954   LDL 73             Passed - No abnormal creatine kinase in past 12 months     No lab results found.             Passed - Recent (12 mo) or future (30 days) visit within the authorizing provider's specialty     Patient has had an office visit with the authorizing provider or a provider within the authorizing providers department within the previous 12 mos or has a future within next 30 days. See \"Patient Info\" tab in inbasket, or \"Choose Columns\" in Meds & Orders section of the refill encounter.              Passed - Medication is active on med list        Passed - Patient is age 18 or older             Milagros Rao RN 09/29/22 9:24 PM  "

## 2022-10-25 ENCOUNTER — HOSPITAL ENCOUNTER (OUTPATIENT)
Dept: MRI IMAGING | Facility: HOSPITAL | Age: 72
Discharge: HOME | End: 2022-10-25
Attending: INTERNAL MEDICINE
Payer: COMMERCIAL

## 2022-10-25 VITALS
SYSTOLIC BLOOD PRESSURE: 111 MMHG | HEART RATE: 75 BPM | RESPIRATION RATE: 18 BRPM | DIASTOLIC BLOOD PRESSURE: 75 MMHG | OXYGEN SATURATION: 96 %

## 2022-10-25 DIAGNOSIS — I21.11 ST ELEVATION MYOCARDIAL INFARCTION INVOLVING RIGHT CORONARY ARTERY (H): ICD-10-CM

## 2022-10-25 DIAGNOSIS — R06.02 SOB (SHORTNESS OF BREATH): ICD-10-CM

## 2022-10-25 DIAGNOSIS — I10 ESSENTIAL HYPERTENSION, BENIGN: ICD-10-CM

## 2022-10-25 DIAGNOSIS — I25.10 CORONARY ARTERY DISEASE INVOLVING NATIVE CORONARY ARTERY OF NATIVE HEART WITHOUT ANGINA PECTORIS: ICD-10-CM

## 2022-10-25 DIAGNOSIS — I49.3 PVC'S (PREMATURE VENTRICULAR CONTRACTIONS): ICD-10-CM

## 2022-10-25 LAB
ATRIAL RATE - MUSE: 67 BPM
ATRIAL RATE - MUSE: 76 BPM
DIASTOLIC BLOOD PRESSURE - MUSE: NORMAL MMHG
DIASTOLIC BLOOD PRESSURE - MUSE: NORMAL MMHG
INTERPRETATION ECG - MUSE: NORMAL
INTERPRETATION ECG - MUSE: NORMAL
P AXIS - MUSE: 46 DEGREES
P AXIS - MUSE: 56 DEGREES
PR INTERVAL - MUSE: 164 MS
PR INTERVAL - MUSE: 166 MS
QRS DURATION - MUSE: 136 MS
QRS DURATION - MUSE: 138 MS
QT - MUSE: 398 MS
QT - MUSE: 446 MS
QTC - MUSE: 447 MS
QTC - MUSE: 471 MS
R AXIS - MUSE: 94 DEGREES
R AXIS - MUSE: 94 DEGREES
SYSTOLIC BLOOD PRESSURE - MUSE: NORMAL MMHG
SYSTOLIC BLOOD PRESSURE - MUSE: NORMAL MMHG
T AXIS - MUSE: 1 DEGREES
T AXIS - MUSE: 25 DEGREES
VENTRICULAR RATE- MUSE: 67 BPM
VENTRICULAR RATE- MUSE: 76 BPM

## 2022-10-25 PROCEDURE — 999N000122 MR MYOCARDIUM  OVERREAD

## 2022-10-25 PROCEDURE — 93016 CV STRESS TEST SUPVJ ONLY: CPT | Performed by: INTERNAL MEDICINE

## 2022-10-25 PROCEDURE — 93005 ELECTROCARDIOGRAM TRACING: CPT

## 2022-10-25 PROCEDURE — 250N000011 HC RX IP 250 OP 636: Performed by: INTERNAL MEDICINE

## 2022-10-25 PROCEDURE — A9585 GADOBUTROL INJECTION: HCPCS | Performed by: INTERNAL MEDICINE

## 2022-10-25 PROCEDURE — 93018 CV STRESS TEST I&R ONLY: CPT | Performed by: INTERNAL MEDICINE

## 2022-10-25 PROCEDURE — 75563 CARD MRI W/STRESS IMG & DYE: CPT

## 2022-10-25 PROCEDURE — 255N000002 HC RX 255 OP 636: Performed by: INTERNAL MEDICINE

## 2022-10-25 PROCEDURE — 93010 ELECTROCARDIOGRAM REPORT: CPT | Performed by: INTERNAL MEDICINE

## 2022-10-25 PROCEDURE — 93010 ELECTROCARDIOGRAM REPORT: CPT | Mod: 77 | Performed by: INTERNAL MEDICINE

## 2022-10-25 PROCEDURE — 75563 CARD MRI W/STRESS IMG & DYE: CPT | Mod: 26 | Performed by: INTERNAL MEDICINE

## 2022-10-25 RX ORDER — CALCIUM CARBONATE 500 MG/1
1 TABLET, CHEWABLE ORAL 2 TIMES DAILY PRN
COMMUNITY
End: 2022-11-22

## 2022-10-25 RX ORDER — GADOBUTROL 604.72 MG/ML
20 INJECTION INTRAVENOUS ONCE
Status: COMPLETED | OUTPATIENT
Start: 2022-10-25 | End: 2022-10-25

## 2022-10-25 RX ORDER — AMINOPHYLLINE 25 MG/ML
50 INJECTION, SOLUTION INTRAVENOUS
Status: DISCONTINUED | OUTPATIENT
Start: 2022-10-25 | End: 2022-10-25 | Stop reason: HOSPADM

## 2022-10-25 RX ORDER — REGADENOSON 0.08 MG/ML
0.4 INJECTION, SOLUTION INTRAVENOUS ONCE
Status: COMPLETED | OUTPATIENT
Start: 2022-10-25 | End: 2022-10-25

## 2022-10-25 RX ADMIN — REGADENOSON 0.4 MG: 0.08 INJECTION, SOLUTION INTRAVENOUS at 10:20

## 2022-10-25 RX ADMIN — GADOBUTROL 20 ML: 604.72 INJECTION INTRAVENOUS at 09:56

## 2022-10-25 RX ADMIN — AMINOPHYLLINE 50 MG: 25 INJECTION, SOLUTION INTRAVENOUS at 10:25

## 2022-10-25 NOTE — PROGRESS NOTES
Pt here for cardiac stress MRI today. Pt admits he did not listen to his voicemail left by  nursing staff yesterday stating to hold his amlopidine, metoprolol and hydrochlorothiazide prior to his stress test today as he said it was left on his home phone vs cell phone. Unable to get a hold of Dr. Kumar and/or Dr. Hernandez, so writer reached out to Dr. Wilkes (pt's primary cardiologist) who approved to proceed with cardiac stress MRI today.    Pt baseline HR: SR with RBBB and occ. PVC's with HR 78. /84. Pt denies any chest pain or shortness of breath.     Chey Damon RN

## 2022-11-18 ASSESSMENT — ENCOUNTER SYMPTOMS
FREQUENCY: 0
JOINT SWELLING: 0
HEARTBURN: 0
HEMATOCHEZIA: 0
EYE PAIN: 0
SORE THROAT: 0
CHILLS: 0
COUGH: 0
FEVER: 0
ABDOMINAL PAIN: 0
NERVOUS/ANXIOUS: 0
HEMATURIA: 0
DIZZINESS: 0
ARTHRALGIAS: 0
PALPITATIONS: 0
PARESTHESIAS: 0
MYALGIAS: 0
NAUSEA: 0
WEAKNESS: 0
SHORTNESS OF BREATH: 0
HEADACHES: 0
CONSTIPATION: 0
DYSURIA: 0
DIARRHEA: 0

## 2022-11-18 ASSESSMENT — ACTIVITIES OF DAILY LIVING (ADL): CURRENT_FUNCTION: NO ASSISTANCE NEEDED

## 2022-11-22 ENCOUNTER — OFFICE VISIT (OUTPATIENT)
Dept: INTERNAL MEDICINE | Facility: CLINIC | Age: 72
End: 2022-11-22
Attending: FAMILY MEDICINE
Payer: COMMERCIAL

## 2022-11-22 VITALS
SYSTOLIC BLOOD PRESSURE: 132 MMHG | OXYGEN SATURATION: 98 % | WEIGHT: 211 LBS | HEIGHT: 69 IN | HEART RATE: 89 BPM | DIASTOLIC BLOOD PRESSURE: 88 MMHG | TEMPERATURE: 97.5 F | BODY MASS INDEX: 31.25 KG/M2 | RESPIRATION RATE: 16 BRPM

## 2022-11-22 DIAGNOSIS — E66.09 CLASS 1 OBESITY DUE TO EXCESS CALORIES WITHOUT SERIOUS COMORBIDITY WITH BODY MASS INDEX (BMI) OF 31.0 TO 31.9 IN ADULT: ICD-10-CM

## 2022-11-22 DIAGNOSIS — Z00.00 ROUTINE GENERAL MEDICAL EXAMINATION AT A HEALTH CARE FACILITY: Primary | ICD-10-CM

## 2022-11-22 DIAGNOSIS — Z12.5 SCREENING PSA (PROSTATE SPECIFIC ANTIGEN): ICD-10-CM

## 2022-11-22 DIAGNOSIS — E78.00 PURE HYPERCHOLESTEROLEMIA: ICD-10-CM

## 2022-11-22 DIAGNOSIS — Z86.39 PERSONAL HISTORY OF OTHER ENDOCRINE, NUTRITIONAL AND METABOLIC DISEASE: ICD-10-CM

## 2022-11-22 DIAGNOSIS — E66.811 CLASS 1 OBESITY DUE TO EXCESS CALORIES WITHOUT SERIOUS COMORBIDITY WITH BODY MASS INDEX (BMI) OF 31.0 TO 31.9 IN ADULT: ICD-10-CM

## 2022-11-22 DIAGNOSIS — E78.2 MIXED DYSLIPIDEMIA: ICD-10-CM

## 2022-11-22 DIAGNOSIS — I25.10 ATHEROSCLEROSIS OF NATIVE CORONARY ARTERY OF NATIVE HEART WITHOUT ANGINA PECTORIS: ICD-10-CM

## 2022-11-22 PROCEDURE — 99213 OFFICE O/P EST LOW 20 MIN: CPT | Mod: 25 | Performed by: INTERNAL MEDICINE

## 2022-11-22 PROCEDURE — G0438 PPPS, INITIAL VISIT: HCPCS | Performed by: INTERNAL MEDICINE

## 2022-11-22 ASSESSMENT — ENCOUNTER SYMPTOMS
PARESTHESIAS: 0
FREQUENCY: 0
MYALGIAS: 0
HEMATURIA: 0
DIARRHEA: 0
WEAKNESS: 0
SHORTNESS OF BREATH: 0
COUGH: 0
PALPITATIONS: 0
NERVOUS/ANXIOUS: 0
FEVER: 0
CHILLS: 0
ARTHRALGIAS: 0
HEMATOCHEZIA: 0
SORE THROAT: 0
DYSURIA: 0
NAUSEA: 0
DIZZINESS: 0
HEADACHES: 0
EYE PAIN: 0
HEARTBURN: 0
JOINT SWELLING: 0
CONSTIPATION: 0
ABDOMINAL PAIN: 0

## 2022-11-22 ASSESSMENT — ACTIVITIES OF DAILY LIVING (ADL): CURRENT_FUNCTION: NO ASSISTANCE NEEDED

## 2022-11-22 NOTE — PROGRESS NOTES
"SUBJECTIVE:   Kyle is a 71 year old who presents for Preventive Visit.    Patient has been advised of split billing requirements and indicates understanding: Yes  Are you in the first 12 months of your Medicare coverage?  No    Healthy Habits:     In general, how would you rate your overall health?  Good    Frequency of exercise:  2-3 days/week    Duration of exercise:  15-30 minutes    Do you usually eat at least 4 servings of fruit and vegetables a day, include whole grains    & fiber and avoid regularly eating high fat or \"junk\" foods?  Yes    Taking medications regularly:  Yes    Medication side effects:  Not applicable    Ability to successfully perform activities of daily living:  No assistance needed    Home Safety:  No safety concerns identified    Hearing Impairment:  Difficulty following a conversation in a noisy restaurant or crowded room, need to ask people to speak up or repeat themselves and difficulty understanding soft or whispered speech    In the past 6 months, have you been bothered by leaking of urine?  No    In general, how would you rate your overall mental or emotional health?  Good      PHQ-2 Total Score: 0    Additional concerns today:  Yes      Have you ever done Advance Care Planning? (For example, a Health Directive, POLST, or a discussion with a medical provider or your loved ones about your wishes): Yes, advance care planning is on file.       Fall risk  Fallen 2 or more times in the past year?: No  Any fall with injury in the past year?: No    Cognitive Screening   1) Repeat 3 items (Leader, Season, Table)    2) Clock draw: NORMAL  3) 3 item recall: Recalls 3 objects  Results: 3 items recalled: COGNITIVE IMPAIRMENT LESS LIKELY    Mini-CogTM Copyright BRIT Paiz. Licensed by the author for use in [facility]; reprinted with permission (lorin@.[email or web address]). All rights reserved.      Do you have sleep apnea, excessive snoring or daytime drowsiness?: yes    Reviewed and updated as " needed this visit by clinical staff   Tobacco  Allergies  Meds              Reviewed and updated as needed this visit by Provider                 Social History     Tobacco Use     Smoking status: Former     Types: Cigars     Quit date: 1985     Years since quittin.9     Smokeless tobacco: Never     Tobacco comments:     occasional cigar   Substance Use Topics     Alcohol use: Yes     Comment: 2 drinks per month     If you drink alcohol do you typically have >3 drinks per day or >7 drinks per week? No    Alcohol Use 2022   Prescreen: >3 drinks/day or >7 drinks/week? -   Prescreen: >3 drinks/day or >7 drinks/week? No     Current providers sharing in care for this patient include:   Patient Care Team:  Omega Wilkerson MD as PCP - General  Omega Wilkerson MD as Assigned PCP  Heydi Wilkes MD as Assigned Heart and Vascular Provider  Heydi Wilkes MD as MD (Cardiovascular Disease)  Tino Carrillo MD as Assigned Neuroscience Provider    The following health maintenance items are reviewed in Epic and correct as of today:  Health Maintenance   Topic Date Due     HEPATITIS C SCREENING  Never done     AORTIC ANEURYSM SCREENING (SYSTEM ASSIGNED)  Never done     MEDICARE ANNUAL WELLNESS VISIT  2022     ANNUAL REVIEW OF HM ORDERS  2023     FALL RISK ASSESSMENT  2023     LIPID  [id number]     COLORECTAL CANCER SCREENING  2026     ADVANCE CARE PLANNING  2027     DTAP/TDAP/TD IMMUNIZATION (3 - Td or Tdap) 2032     PHQ-2 (once per calendar year)  Completed     INFLUENZA VACCINE  Completed     Pneumococcal Vaccine: 65+ Years  Completed     ZOSTER IMMUNIZATION  Completed     COVID-19 Vaccine  Completed     IPV IMMUNIZATION  Aged Out     MENINGITIS IMMUNIZATION  Aged Out         Review of Systems   Constitutional: Negative for chills and fever.   HENT: Negative for congestion, ear pain, hearing loss and sore throat.    Eyes: Negative for pain and visual  "disturbance.   Respiratory: Negative for cough and shortness of breath.    Cardiovascular: Negative for chest pain, palpitations and peripheral edema.   Gastrointestinal: Negative for abdominal pain, constipation, diarrhea, heartburn, hematochezia and nausea.   Genitourinary: Positive for impotence. Negative for dysuria, frequency, genital sores, hematuria, penile discharge and urgency.   Musculoskeletal: Negative for arthralgias, joint swelling and myalgias.   Skin: Negative for rash.   Neurological: Negative for dizziness, weakness, headaches and paresthesias.   Psychiatric/Behavioral: Negative for mood changes. The patient is not nervous/anxious.        OBJECTIVE:   /88 (BP Location: Right arm, Patient Position: Sitting, Cuff Size: Adult Regular)   Pulse 89   Temp 97.5  F (36.4  C)   Resp 16   Ht 1.753 m (5' 9\")   Wt 95.7 kg (211 lb)   SpO2 98%   BMI 31.16 kg/m   Estimated body mass index is 31.16 kg/m  as calculated from the following:    Height as of this encounter: 1.753 m (5' 9\").    Weight as of this encounter: 95.7 kg (211 lb).  Physical Exam     General: Alert, in no distress  Skin: No significant lesion seen.  Eyes/nose/throat: Eyes without scleral icterus, eye movements normal, pupils equal and reactive, oropharynx clear, ears with normal TM's  MSK: Neck with good ROM  Lymphatic: Neck without adenopathy or masses  Endocrine: Thyroid with no nodules to palpation  Pulm: Lungs clear to auscultation bilaterally  Cardiac: Heart with regular rate and rhythm, no murmur or gallop  GI: Abdomen obese, soft, nontender. No palpable enlargement of liver or spleen  MSK: Extremities no tenderness or edema  Neuro: Moves all extremities, without focal weakness  Psych: Alert, normal mental status. Normal affect and speech      ASSESSMENT / PLAN:     Annual wellness visit  Kyle is going to get fasting blood test on a future morning, and is particular interested to see how his lipid profile looks since he has " switched to a largely plant-based diet.    That plant-based diet may be giving him some intestinal gas, so I made some suggestions for enzyme supplement.    He is up-to-date on vaccines including bivalent COVID booster and seasonal flu shot.    No changes to his medications.  Kyle would like to get off of amlodipine (which was the last blood pressure medicine added).  I told him that if he could lose 15 or 20 pounds, there is a good chance he could drop the amlodipine.    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  His presenting symptom was indigestion, which would have been quite characteristic for inferior MI.  Coronary angiography and revascularization was performed via a right radial artery approach     Coronary artery disease, most recent intervention for right coronary artery occlusion and stents placed March 4, 2022 after inferior ST elevation MI  Previous cardiac history of  frequent PVCs, prior myocardial infarction and coronary interventions in 1998, 2008, and then July 2016 with drug-eluting stents placed into the left anterior descending artery and mid circumflex     Admitted to [place] on March 4th, 2022 inferior STEMI.   S/p 2 SMITHA dRCA occlusion; residual diffuse ISR or proximal and mRCA stents.  Continues on ASA 81mg QD, Lipitor 40mg QD (LDL 60), Brilinta 90mg BID (can transition to Plavix after one month) and Toprol 25 mg QD. Dual antiplatelet therapy minimum of one years time.  Will be following up Dr Wilkes March 22, 2022     Mostly recovered from left distal tibial fracture July 12, 2022, which required open reduction internal fixation surgery with placement of 6 or 7 screws, which was able to be done despite his being only 4 months after his heart  attack and coronary stent    Swallowing difficulties for dry scratchy texture food such as pretzels and chips, and that may have been the cause of a vasovagal faint after which caused him to fall and triggered the tibial fracture    Hypertension in the context of coronary disease, blood pressures running higher over the last year  As of November 22, 2022 his blood pressure regimen is  Lisinopril 20 mg once a day (reduced from twice a day)  Metoprolol succinate 25 mg once a day  Amlodipine 5 mg once a day  Hydrochlorothiazide 25 mg once a day    BP Readings from Last 6 Encounters:   11/22/22 132/88   10/25/22 111/75   07/14/22 122/74   06/29/22 134/75   05/03/22 122/82   03/10/22 118/76      Advanced osteoarthritis right knee, chronic torn meniscus right knee, which demonstrated on MRI in December 2014, not too symptomatic, but he should continue to work with [email or web address] on this issue, and also pursue quadriceps muscle strengthening  The 2014 knee MRI demonstrated an extensive complex tear of the posterior horn and body of the medial meniscus, also degenerative arthritis in the medial and patellofemoral compartments with grade IV chondromalacia, also low-grade sprain or inflammation of the medial collateral ligament.  Even though this was 6 years ago, his knee is holding up reasonably well.  He did stress it yesterday when he was moving a panel.  But today says his knee feels pretty good.  There is a mild to moderate sized effusion on physical exam.  I think he could continue with conservative management strategy, but should at [email or web address] about learning some home exercises to strengthen his quadriceps, which will help protect his knee, and delay the need for surgery.      Possible carpal tunnel syndrome right hand, mild  He told me he experiences a little bit of tingling in the hand.   I would suggest he try a carpal tunnel Velcro splint, and especially in bed.    History of bladder cancer April 2014, working with  Dr. Pedro Guerrero at [facility], getting yearly cystoscopies, has also had intermittent tenderness right testicle.      Flatulence, which very well could be from indigestible vegetables sugars (alpha glycosides), for which I suggested that he try over-the-counter enzyme supplement, Beano or similar.  He noticed more gas after he switched to a predominantly vegetable diet.     Obesity with body mass index 31, likely contributing to blood pressure, would like to see him lose 20 pounds this year.    I like to see him get down around 180 pounds by the end of the year.  I reminded him of the importance of eating slowly, controlling portion size, and identifying problem foods to curtail or eliminate, especially starches, sweets, fried foods.  He told me that alcohol consumption is approximately once a week, so that does not sound like a big source of calories for him.    Wt Readings from Last 5 Encounters:   11/22/22 95.7 kg (211 lb)   07/14/22 95.7 kg (211 lb)   05/03/22 95.7 kg (211 lb)   03/10/22 96.1 kg (211 lb 12.8 oz)   03/10/22 95.3 kg (210 lb)     Hyperlipidemia in the context of coronary disease, on maximum dose atorvastatin   Lipid profile March 4, 2022 showed good control with LDL 60, HDL bit low at 34, triglycerides okay 123, total cholesterol 119    Chronic low back pain and scoliosis, does home exercises    Had lumbar RFA performed around October 26, 2022--he reported procedure was successful in terms of pain relief  I reminded that it reminded him that he needs to keep doing his home exercises    Droopy eyelids (dermatochalasis) for which he should consult with his ophthalmologist.      Erectile dysfunction, okay to increase sildenafil up to 100 mg dose     Gastroesophageal reflux, not too bothersome.       History of trochanteric bursitis both hips, in remission      Darkly pigmented papules on both sides of his neck, saw [facility], Nothing concerning.     History of tubular adenoma  "colon polyp 6 mm sigmoid June 2016, due for recheck this year June 2021. He already received a letter from [facility] to schedule his follow-up colonoscopy for this year.     Carpal tunnel syndrome and right thumb osteoarthritis, not too bothersome these days      Received a third shot of COVID-19 vaccine Pfizer September 30, 2021    COVID-19 Vaccine Bivalent Booster 12+ (Pfizer) 09/13/2022         Immunization History   Administered Date(s) Administered     COVID-19 Vaccine 12+ (Pfizer 2022) 04/20/2022     COVID-19 Vaccine 12+ (Pfizer) 02/01/2021, 02/22/2021, 09/30/2021     COVID-19 Vaccine Bivalent Booster 12+ (Pfizer) 09/13/2022     DT (PEDS <7y) 01/01/1995, 05/01/2005     Flu 65+ Years 10/17/2022     Flu, Unspecified 11/14/2007, 10/10/2008, 09/22/2010, 11/02/2011, 12/17/2012     Influenza (H1N1) 12/15/2009     Influenza (High Dose) 3 valent vaccine 10/30/2015, 11/23/2016, 10/18/2017, 09/26/2018, 11/05/2019     Influenza (IIV3) PF 11/25/2014     Influenza Vaccine 65+ (Fluzone HD) 10/22/2020, 10/16/2021     Influenza Vaccine, 6+MO IM (QUADRIVALENT W/PRESERVATIVES) 12/15/2009, 12/06/2013, 11/25/2014     Pneumo Conj 13-V (2010&after) 05/23/2016     Pneumococcal 23 valent 11/25/2014     Td,adult,historic,unspecified 05/01/2005     Tdap (Adacel,Boostrix) 04/19/2011, 07/14/2022     Zoster vaccine recombinant adjuvanted (SHINGRIX) 12/28/2021, 05/15/2022     Zoster vaccine, live 12/06/2013, 12/28/2021       COUNSELING:  Reviewed preventive health counseling, as reflected in patient instructions       Healthy diet/nutrition    BMI:   Estimated body mass index is 31.16 kg/m  as calculated from the following:    Height as of this encounter: 1.753 m (5' 9\").    Weight as of this encounter: 95.7 kg (211 lb).   Weight management plan: Discussed healthy diet and exercise guidelines      He reports that he quit smoking about 37 years ago. His smoking use included cigars. He has never used smokeless " tobacco.      Appropriate preventive services were discussed with this patient, including applicable screening as appropriate for cardiovascular disease, diabetes, osteopenia/osteoporosis, and glaucoma.  As appropriate for age/gender, discussed screening for colorectal cancer, prostate cancer, breast cancer, and cervical cancer. Checklist reviewing preventive services available has been given to the patient.    Reviewed patients plan of care and provided an AVS. The Basic Care Plan (routine screening as documented in Health Maintenance) for Josafat meets the Care Plan requirement. This Care Plan has been established and reviewed with the Patient.    JORDAN SOLOMON MD  [facility]    Identified Health Risks:

## 2022-11-22 NOTE — PATIENT INSTRUCTIONS
Annual wellness visit  Kyle is going to get fasting blood test on a future morning, and is particular interested to see how his lipid profile looks since he has switched to a largely plant-based diet.    That plant-based diet may be giving him some intestinal gas, so I made some suggestions for enzyme supplement.    He is up-to-date on vaccines including bivalent COVID booster and seasonal flu shot.    No changes to his medications.  Kyle would like to get off of amlodipine (which was the last blood pressure medicine added).  I told him that if he could lose 15 or 20 pounds, there is a good chance he could drop the amlodipine.    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  His presenting symptom was indigestion, which would have been quite characteristic for inferior MI.  Coronary angiography and revascularization was performed via a right radial artery approach     Coronary artery disease, most recent intervention for right coronary artery occlusion and stents placed March 4, 2022 after inferior ST elevation MI  Previous cardiac history of  frequent PVCs, prior myocardial infarction and coronary interventions in 1998, 2008, and then July 2016 with drug-eluting stents placed into the left anterior descending artery and mid circumflex     Admitted to [place] on March 4th, 2022 inferior STEMI.   S/p 2 SMITHA dRCA occlusion; residual diffuse ISR or proximal and mRCA stents.  Continues on ASA 81mg QD, Lipitor 40mg QD (LDL 60), Brilinta 90mg BID (can transition to Plavix after one month) and Toprol 25 mg QD. Dual antiplatelet therapy minimum of one years time.  Will be following up Dr Wilkes March 22, 2022     Mostly recovered from left distal tibial fracture July 12, 2022, which  required open reduction internal fixation surgery with placement of 6 or 7 screws, which was able to be done despite his being only 4 months after his heart attack and coronary stent    Swallowing difficulties for dry scratchy texture food such as pretzels and chips, and that may have been the cause of a vasovagal faint after which caused him to fall and triggered the tibial fracture    Hypertension in the context of coronary disease, blood pressures running higher over the last year  As of November 22, 2022 his blood pressure regimen is  Lisinopril 20 mg once a day (reduced from twice a day)  Metoprolol succinate 25 mg once a day  Amlodipine 5 mg once a day  Hydrochlorothiazide 25 mg once a day    BP Readings from Last 6 Encounters:   11/22/22 132/88   10/25/22 111/75   07/14/22 122/74   06/29/22 134/75   05/03/22 122/82   03/10/22 118/76      Advanced osteoarthritis right knee, chronic torn meniscus right knee, which demonstrated on MRI in December 2014, not too symptomatic, but he should continue to work with [facility] on this issue, and also pursue quadriceps muscle strengthening  The 2014 knee MRI demonstrated an extensive complex tear of the posterior horn and body of the medial meniscus, also degenerative arthritis in the medial and patellofemoral compartments with grade IV chondromalacia, also low-grade sprain or inflammation of the medial collateral ligament.  Even though this was 6 years ago, his knee is holding up reasonably well.  He did stress it yesterday when he was moving a panel.  But today says his knee feels pretty good.  There is a mild to moderate sized effusion on physical exam.  I think he could continue with conservative management strategy, but should at [facility] about learning some home exercises to strengthen his quadriceps, which will help protect his knee, and delay the need for surgery.      Possible carpal tunnel syndrome right hand, mild  He told me he experiences a little bit  of tingling in the hand.   I would suggest he try a carpal tunnel Velcro splint, and especially in bed.    History of bladder cancer April 2014, working with Dr. Pedro Guerrero at [facility], getting yearly cystoscopies, has also had intermittent tenderness right testicle.      Flatulence, which very well could be from indigestible vegetables sugars (alpha glycosides), for which I suggested that he try over-the-counter enzyme supplement, Beano or similar.  He noticed more gas after he switched to a predominantly vegetable diet.     Obesity with body mass index 31, likely contributing to blood pressure, would like to see him lose 20 pounds this year.    I like to see him get down around 180 pounds by the end of the year.  I reminded him of the importance of eating slowly, controlling portion size, and identifying problem foods to curtail or eliminate, especially starches, sweets, fried foods.  He told me that alcohol consumption is approximately once a week, so that does not sound like a big source of calories for him.        Wt Readings from Last 5 Encounters:   11/22/22 95.7 kg (211 lb)   07/14/22 95.7 kg (211 lb)   05/03/22 95.7 kg (211 lb)   03/10/22 96.1 kg (211 lb 12.8 oz)   03/10/22 95.3 kg (210 lb)     Hyperlipidemia in the context of coronary disease, on maximum dose atorvastatin   Lipid profile March 4, 2022 showed good control with LDL 60, HDL bit low at 34, triglycerides okay 123, total cholesterol 119    Chronic low back pain and scoliosis, does home exercises    Had lumbar RFA performed around October 26, 2022--he reported procedure was successful in terms of pain relief  I reminded that it reminded him that he needs to keep doing his home exercises    Droopy eyelids (dermatochalasis) for which he should consult with his ophthalmologist.      Erectile dysfunction, okay to increase sildenafil up to 100 mg dose     Gastroesophageal reflux, not too bothersome.       History of trochanteric bursitis  both hips, in remission      Darkly pigmented papules on both sides of his neck, saw [facility], Nothing concerning.     History of tubular adenoma colon polyp 6 mm sigmoid June 2016, due for recheck this year June 2021. He already received a letter from [facility] to schedule his follow-up colonoscopy for this year.     Carpal tunnel syndrome and right thumb osteoarthritis, not too bothersome these days      Received a third shot of COVID-19 vaccine Pfizer September 30, 2021    COVID-19 Vaccine Bivalent Booster 12+ (Pfizer) 09/13/2022

## 2022-11-29 ENCOUNTER — LAB (OUTPATIENT)
Dept: LAB | Facility: CLINIC | Age: 72
End: 2022-11-29
Payer: COMMERCIAL

## 2022-11-29 DIAGNOSIS — Z12.5 SCREENING PSA (PROSTATE SPECIFIC ANTIGEN): ICD-10-CM

## 2022-11-29 DIAGNOSIS — Z86.39 PERSONAL HISTORY OF OTHER ENDOCRINE, NUTRITIONAL AND METABOLIC DISEASE: ICD-10-CM

## 2022-11-29 DIAGNOSIS — E66.811 CLASS 1 OBESITY DUE TO EXCESS CALORIES WITHOUT SERIOUS COMORBIDITY WITH BODY MASS INDEX (BMI) OF 31.0 TO 31.9 IN ADULT: ICD-10-CM

## 2022-11-29 DIAGNOSIS — Z11.59 NEED FOR HEPATITIS C SCREENING TEST: Primary | ICD-10-CM

## 2022-11-29 DIAGNOSIS — E78.2 MIXED DYSLIPIDEMIA: ICD-10-CM

## 2022-11-29 DIAGNOSIS — E66.09 CLASS 1 OBESITY DUE TO EXCESS CALORIES WITHOUT SERIOUS COMORBIDITY WITH BODY MASS INDEX (BMI) OF 31.0 TO 31.9 IN ADULT: ICD-10-CM

## 2022-11-29 DIAGNOSIS — Z00.00 ROUTINE GENERAL MEDICAL EXAMINATION AT A HEALTH CARE FACILITY: ICD-10-CM

## 2022-11-29 LAB
ALBUMIN SERPL BCG-MCNC: 4.6 G/DL (ref 3.5–5.2)
ALP SERPL-CCNC: 84 U/L (ref 40–129)
ALT SERPL W P-5'-P-CCNC: 27 U/L (ref 10–50)
ANION GAP SERPL CALCULATED.3IONS-SCNC: 13 MMOL/L (ref 7–15)
AST SERPL W P-5'-P-CCNC: 26 U/L (ref 10–50)
BILIRUB SERPL-MCNC: 0.6 MG/DL
BUN SERPL-MCNC: 12.2 MG/DL (ref 8–23)
CALCIUM SERPL-MCNC: 9.5 MG/DL (ref 8.8–10.2)
CHLORIDE SERPL-SCNC: 99 MMOL/L (ref 98–107)
CHOLEST SERPL-MCNC: 159 MG/DL
CREAT SERPL-MCNC: 0.71 MG/DL (ref 0.67–1.17)
DEPRECATED HCO3 PLAS-SCNC: 25 MMOL/L (ref 22–29)
ERYTHROCYTE [DISTWIDTH] IN BLOOD BY AUTOMATED COUNT: 11.9 % (ref 10–15)
GFR SERPL CREATININE-BSD FRML MDRD: >90 ML/MIN/1.73M2
GLUCOSE SERPL-MCNC: 91 MG/DL (ref 70–99)
HBA1C MFR BLD: 5.6 % (ref 0–5.6)
HCT VFR BLD AUTO: 44.1 % (ref 40–53)
HDLC SERPL-MCNC: 49 MG/DL
HGB BLD-MCNC: 15.2 G/DL (ref 13.3–17.7)
LDLC SERPL CALC-MCNC: 81 MG/DL
MCH RBC QN AUTO: 32.1 PG (ref 26.5–33)
MCHC RBC AUTO-ENTMCNC: 34.5 G/DL (ref 31.5–36.5)
MCV RBC AUTO: 93 FL (ref 78–100)
NONHDLC SERPL-MCNC: 110 MG/DL
PLATELET # BLD AUTO: 229 10E3/UL (ref 150–450)
POTASSIUM SERPL-SCNC: 4.2 MMOL/L (ref 3.4–5.3)
PROT SERPL-MCNC: 7 G/DL (ref 6.4–8.3)
PSA SERPL-MCNC: 0.33 NG/ML (ref 0–6.5)
RBC # BLD AUTO: 4.74 10E6/UL (ref 4.4–5.9)
SODIUM SERPL-SCNC: 137 MMOL/L (ref 136–145)
TRIGL SERPL-MCNC: 143 MG/DL
TSH SERPL DL<=0.005 MIU/L-ACNC: 3.77 UIU/ML (ref 0.3–4.2)
WBC # BLD AUTO: 7.9 10E3/UL (ref 4–11)

## 2022-11-29 PROCEDURE — 36415 COLL VENOUS BLD VENIPUNCTURE: CPT

## 2022-11-29 PROCEDURE — 86803 HEPATITIS C AB TEST: CPT

## 2022-11-29 PROCEDURE — 83036 HEMOGLOBIN GLYCOSYLATED A1C: CPT

## 2022-11-29 PROCEDURE — G0103 PSA SCREENING: HCPCS

## 2022-11-29 PROCEDURE — 84443 ASSAY THYROID STIM HORMONE: CPT

## 2022-11-29 PROCEDURE — 85027 COMPLETE CBC AUTOMATED: CPT

## 2022-11-29 PROCEDURE — 80053 COMPREHEN METABOLIC PANEL: CPT

## 2022-11-29 PROCEDURE — 80061 LIPID PANEL: CPT

## 2022-11-30 ENCOUNTER — TRANSFERRED RECORDS (OUTPATIENT)
Dept: HEALTH INFORMATION MANAGEMENT | Facility: CLINIC | Age: 72
End: 2022-11-30

## 2022-11-30 LAB — HCV AB SERPL QL IA: NONREACTIVE

## 2022-12-01 ENCOUNTER — OFFICE VISIT (OUTPATIENT)
Dept: CARDIOLOGY | Facility: CLINIC | Age: 72
End: 2022-12-01
Attending: INTERNAL MEDICINE
Payer: COMMERCIAL

## 2022-12-01 VITALS
BODY MASS INDEX: 31.76 KG/M2 | HEART RATE: 83 BPM | SYSTOLIC BLOOD PRESSURE: 130 MMHG | DIASTOLIC BLOOD PRESSURE: 62 MMHG | RESPIRATION RATE: 16 BRPM | WEIGHT: 214.4 LBS | OXYGEN SATURATION: 95 % | HEIGHT: 69 IN

## 2022-12-01 DIAGNOSIS — I10 HYPERTENSION, UNSPECIFIED TYPE: ICD-10-CM

## 2022-12-01 DIAGNOSIS — I49.3 PVC'S (PREMATURE VENTRICULAR CONTRACTIONS): Primary | ICD-10-CM

## 2022-12-01 DIAGNOSIS — I10 ESSENTIAL HYPERTENSION: ICD-10-CM

## 2022-12-01 DIAGNOSIS — I25.10 CORONARY ARTERY DISEASE INVOLVING NATIVE CORONARY ARTERY OF NATIVE HEART WITHOUT ANGINA PECTORIS: ICD-10-CM

## 2022-12-01 DIAGNOSIS — E78.5 DYSLIPIDEMIA: ICD-10-CM

## 2022-12-01 PROCEDURE — 99214 OFFICE O/P EST MOD 30 MIN: CPT | Performed by: INTERNAL MEDICINE

## 2022-12-01 NOTE — PROGRESS NOTES
"       [facility] HEART CARE   [address], [place] [address]   [email or web address]   OFFICE: [phone]          Thank you Omega Montilla for asking the [facility] Heart Care team to participate in the care of your patient, Josafat Madera.     Impression and Plan     1.  Coronary artery disease.  \"Kyle\" has known coronary disease having had prior myocardial infarction and percutaneous interventions in 1998 and 2008. On 12 August 2008 after the patient had presented with acute occlusion of the right coronary artery. He had 2 stents placed to the vessel.     \"Kyle\" underwent repeat angiography 14 July 2016 at which time he had PCI with stenting of mid left anterior descending coronary artery ([place] [employer] Synergy SMITHA [id number]) and PCI with stenting of mid circumflex coronary artery ([place] [employer] Synergy SMITHA 3.0x20mm).  See Cardiac Diagnostics section below for details.     \"Lola" underwent an exercise stress test 21 December 2018 that was favorable and revealed no evidence of ischemia and normal functional capacity.     In addition, he underwent repeat stress testing 13 October 2020 which again revealed no evidence by ECG of ischemia.  He did come operative, he have some PVCs in the 5 beat NSVT run.     Kyle underwent repeat angiography 4 March 2022  after presenting to [facility] with evidence of a unstable ischemic syndrome.  At that time he had successful PCI with stent deployment to distal right coronary artery (3.5 x 18 mm [place] prior to crux, and a second 4.0 x 16 mm Synergy SMITHA in mid-distal RCA, overlapping proximally with the old mid RCA stent).     Regadenoson MRI 25 October 2022 revealed no evidence of ischemia.    This is stable.  He denies any chest pain or other symptoms concerning for angina.     2.  PVCs.     Continue metoprolol.     3.  Hypertension.  Blood pressure is fairly reasonable in the office today at 130/62 mmHg.     4.  " "Dyslipidemia.  Lipid profile 4 March 2022 revealed LDL 60 mg/dL and HDL 34 mg/dL.  Continue high intensity statin therapy.     Plan on follow-up in approximately one year.     35 minutes spent reviewing prior records (including documentation, laboratory studies, cardiac testing/imaging), interview with patient along with physical exam, planning, and subsequent documentation/crafting of note).           History of Present Illness    Once again I would like to thank you again for asking me to participate in the care of your patient, Josafat Madera.  As you know, but to reiterate for my own records, Josafat Madera is a 71 year old male with known coronary artery disease. Specifically, \"Lola" has known coronary disease having had prior myocardial infarction and percutaneous interventions in 1998 and 2008. On 12 August 2008 after \"Lola"  had presented with acute occlusion of the right coronary artery. He had 2 stents placed to the vessel.     \"Lola" underwent repeat angiography 14 July 2016 at which time he had PCI with stenting of mid left anterior descending coronary artery ([employer] Synergy SMITHA [id number]) and PCI with stenting of mid circumflex coronary artery ([employer] Synergy SMITHA 3.0x20mm).  See Cardiac Diagnostics section below for details.     Kyle underwent an exercise stress test 21 December 2018 that was favorable and revealed no evidence of ischemia and normal functional capacity.     Kyle underwent repeat angiography 4 March 2022 after presenting to [facility] with evidence of a unstable ischemic syndrome.  At that time he had successful PCI with stent deployment to distal right coronary artery (3.5 x 18 mm [place] prior to crux, and a second 4.0 x 16 mm Synergy SMITHA in mid-distal RCA, overlapping proximally with the old mid RCA stent).    On interview today, \"Lola" states that he has been doing well from a cardiac standpoint.  He specifically denies any chest pain, shortness of breath, or " subjective decline in exercise tolerance.  He denies palpitations or lightheadedness.    Further review of systems is otherwise negative/noncontributory (medical record and 13 point review of systems reviewed as well and pertinent positives noted).         Cardiac Diagnostics      Regadenoson MRI 25 October 2022:  Pharmacological Regadenoson stress cardiac MRI is negative for inducible myocardial ischemia.   Pharmacological stress ECG is negative for inducible myocardial ischemia.   Normal left ventricular size, wall thickness with normal ejection fraction. Moderate hypokinesis of the basal inferolateral wall segment. Left ventricular ejection fraction is 60-65% by visual estimation of real time images.   There is small area of transmural infarction involving the basal to mid inferolateral wall.   Non-transmural infarction of the basal inferoseptal and inferior wall segments of the left ventricle representing viable myocardium.      Normal right ventricular size and systolic function.    No evidence of significant valvular abnormalities.    Echocardiogram 5 March 2022 (performed at [facility]):  Normal left ventricular size and systolic performance with ejection fraction of 65%.  Mid basal to mid inferior hypokinesis.  Remaining segments appear hyperdynamic.  Mild septal thickening.  No significant valvular heart disease.  Normal right ventricular size and systolic performance.  Atrial level visualized.    Echocardiogram for December 2020 (personally reviewed):  Normal left ventricular size and systolic performance with a visually estimated ejection fraction of 60-65%.   No significant valvular heart disease is identified on this study.   Normal right ventricular size and systolic performance.   Normal atrial dimensions.    Exercise stress test 13 October 2020 (formally reviewed by Dr. Maxx Russell ([place]) and also personally reviewed):  No ECG evidence of ischemia.  No chest pain symptoms with  exercise.  Exercise-induced PVCs and short run of nonsustained VT was noted (5 beat).    Exercise stress test 19 December 2018:  No ECG evidence of ischemia.  No chest pain symptoms reported with exercise.  Normal functional capacity.    Nuclear stress perfusion study 1 July 2016 (pre-coronary angiogram which was performed 14 July 2016):  There is a large, severe perfusion defect involving the entire lateral wall on the left ventricle that redistributes on the rest images consistent with ischemia.  Normal left ventricular systolic performance with ejection fraction of 54% with lateral hypokinesis.    Nuclear stress perfusion study 28 October 2008:  Medium-sized area of non-transmural infarction in the inferior wall.  Normal left ventricular systolic performance with ejection fraction of 66% with very small area of basal inferior hypokinesis.    Coronary angiogram 4 March 2022 (performed at [facility]).  Left main coronary artery: No significant stenosis.  Left anterior descending coronary artery: Mid 30-40% stenosis.  Mid 50-60% stenosis.  Circumflex coronary artery: Large vessel with mild proximal disease.  Right coronary artery: Mild to moderate in-stent restenosis of proximal-mid RCA stent.  50% in-stent restenosis involving proximal right coronary artery stent.  Acute total occlusion of distal right coronary artery just beyond edge of the mid right coronary artery stent.  90% stenosis in the distal right coronary artery prior to the crux.  Successful PCI with stent deployment to distal right coronary artery (3.5 x 18 mm [place] prior to crux, and a second 4.0 x 16 mm Synergy SMITHA in mid-distal RCA, overlapping proximally with the old mid RCA stent).    Coronary angiography 14 July 2016:  Left anterior descending coronary artery: Mid 75% stenosis.  Left circumflex coronary artery: Mid 90% stenosis with more severe distal stenosis.  Right coronary artery: 25% proximal stenosis and 40% distal stenosis.  Status  post PCI with stenting of mid left anterior descending coronary artery ([employer] Synergy SMITHA [id number]).  Status post PCI with stenting of mid circumflex coronary artery ([employer] Synergy SMITHA 3.0x20mm).  Recommendations:  Consider medical therapy with possible PCI of distal LCX if symptoms not controlled. However small in caliber and likely too small for stent  Continue with prasugrel (Effient  ) for 12 months, but if necessary could discontinue earlier at 3 months. If tolerated would advocate 12 month course.    CT coronary angiography 22 May 2014:  Left main without significant stenosis.  Left anterior descending has a calcified plaque from distal left main and other calcified plaques and soft plaques in the mid left anterior descending which cause moderate stenosis of 50-60%. It appears to be non-obstructive in the left anterior descending.  Mild to moderate disease in the left circumflex.  There is mild disease in the proximal right coronary artery. The stent in the right coronary artery is patent. The distal right coronary artery stent is not able to be evaluated because of significant artifact.    Coronary angiography 12 August 2008 (performed at [facility]) :  Left main coronary artery: Mild irregularities.  Left anterior descending coronary artery: 70-80% stenosis at the branch point of the first major diagonal.  Circumflex artery with small obtuse marginal branch occlusion.  Right coronary artery: Vessel occluded in the proximal third portion. Culprit vessel for myocardial infarction.  PCI with stent placement (3.5×12 mm drug-eluting stent) to right coronary artery.    Holter monitor 22 October 2020:  Frequent PVCs.  Most of the PVCs are from a single site, but somecomplexity is noted including frequent couplets and some triplets.              Physical Examination       /62 (BP Location: Left arm, Patient Position: Sitting, Cuff Size: Adult Regular)    "Pulse 83   Resp 16   Ht 1.753 m (5' 9\")   Wt 97.3 kg (214 lb 6.4 oz)   SpO2 95%   BMI 31.66 kg/m          Wt Readings from Last 3 Encounters:   12/01/22 97.3 kg (214 lb 6.4 oz)   11/22/22 95.7 kg (211 lb)   07/14/22 95.7 kg (211 lb)     The patient is alert and oriented times three. Sclerae are anicteric. Mucosal membranes are moist. Jugular venous pressure is normal. No significant adenopathy/thyromegally appreciated. Lungs are clear with good expansion. On cardiovascular exam, the patient has a regular S1 and S2. Abdomen is soft and non-tender. Extremities reveal no clubbing, cyanosis, or edema.         Medications  Allergies   Current Outpatient Medications   Medication Sig Dispense Refill     amLODIPine (NORVASC) 5 MG tablet TAKE 1 TABLET(5 MG) BY MOUTH DAILY 90 tablet 3     aspirin 81 MG tablet [ASPIRIN 81 MG TABLET] Take 81 mg by mouth daily.       atorvastatin (LIPITOR) 80 MG tablet TAKE 1/2 TABLET(40 MG) BY MOUTH EVERY EVENING 45 tablet 3     clopidogrel (PLAVIX) 75 MG tablet Take 1 tablet (75 mg) by mouth daily Take 1 tablet (75 mg) by mouth daily 90 tablet 1     hydrochlorothiazide (HYDRODIURIL) 25 MG tablet TAKE 1 TABLET(25 MG) BY MOUTH DAILY 90 tablet 3     lisinopril (ZESTRIL) 20 MG tablet TAKE 1 TABLET BY MOUTH DAILY 180 tablet 1     metoprolol succinate ER (TOPROL XL) 25 MG 24 hr tablet TAKE 1 TABLET(25 MG) BY MOUTH DAILY 90 tablet 3     multivitamin with minerals (THERA-M) 9 mg iron-400 mcg Tab tablet [MULTIVITAMIN WITH MINERALS (THERA-M) 9 MG IRON-400 MCG TAB TABLET] Take 1 tablet by mouth daily.       omeprazole (PRILOSEC) 20 MG capsule [OMEPRAZOLE (PRILOSEC) 20 MG CAPSULE] Take 20 mg by mouth daily before breakfast.       sildenafil (VIAGRA) 50 MG tablet [SILDENAFIL (VIAGRA) 50 MG TABLET] Take 1 tablet (50 mg total) by mouth daily as needed for erectile dysfunction. 16 tablet 5     No Known Allergies       Lab Results    Chemistry/lipid CBC Cardiac Enzymes/BNP/TSH/INR   Recent Labs   Lab Test " 11/29/22  0714   CHOL 159   HDL 49   LDL 81   TRIG 143     Recent Labs   Lab Test 11/29/22  0714 03/26/21  0954 12/16/19  1631   LDL 81 73 82     Recent Labs   Lab Test 11/29/22  0714      POTASSIUM 4.2   CHLORIDE 99   CO2 25   GLC 91   BUN 12.2   CR 0.71   GFRESTIMATED >90   KASI 9.5     Recent Labs   Lab Test 11/29/22  0714 07/14/22  1041 03/26/21  0954   CR 0.71 0.67 0.81     Recent Labs   Lab Test 11/29/22  0714 06/27/19  0820   A1C 5.6 5.2          Recent Labs   Lab Test 11/29/22  0714   WBC 7.9   HGB 15.2   HCT 44.1   MCV 93        Recent Labs   Lab Test 11/29/22 0714 07/14/22  1041 03/26/21  0954   HGB 15.2 13.8 15.0    No results for input(s): TROPONINI in the last 38359 hours.  No results for input(s): BNP, NTBNPI, NTBNP in the last 72887 hours.  Recent Labs   Lab Test 11/29/22 0714   TSH 3.77     No results for input(s): INR in the last 38968 hours.     Medical History  Surgical History Family History Social History   Past Medical History:   Diagnosis Date     Bladder cancer (H)     see [facility] notes     Carpal tunnel syndrome      Cataract      Coronary artery disease      GERD (gastroesophageal reflux disease)      Hypercholesteremia      Hypertension      Lower back pain      Scoliosis      Past Surgical History:   Procedure Laterality Date     ANGIOPLASTY  1998/2008     BLADDER SURGERY  03/01/2014    Polyp removal     HC REMOVAL OF TONSILS,<13 Y/O      Description: Tonsillectomy;  Recorded: 06/10/2008;     HERNIA REPAIR  01/01/2009    Bilateral     PHACOEMULSIFICATION CLEAR CORNEA WITH STANDARD INTRAOCULAR LENS IMPLANT Right 07/10/2014    Procedure: Right Cataract Extraction with Intraocular Lens Implantation;  Surgeon: Karl Nava MD;  Location: [facility];  Service:      PHACOEMULSIFICATION CLEAR CORNEA WITH STANDARD INTRAOCULAR LENS IMPLANT Left 08/14/2014    Procedure: CATARACT EXTRACTION WITH INTRAOCULAR LENS IMPLANTATION LEFT EYE;  Surgeon: Karl Nava MD;   Location: [facility];  Service:      right finger surgery Right     Extensor Tendon Repair     TONSILLECTOMY       Family History   Problem Relation Age of Onset     Cerebrovascular Disease Mother      Heart Disease Mother      Heart Disease Father      Crohn's Disease Sister      Diabetes Brother         Social History     Socioeconomic History     Marital status:      Spouse name: Not on file     Number of children: Not on file     Years of education: Not on file     Highest education level: Not on file   Occupational History     Not on file   Tobacco Use     Smoking status: Former     Types: Cigars     Quit date:      Years since quittin.9     Smokeless tobacco: Never     Tobacco comments:     occasional cigar   Substance and Sexual Activity     Alcohol use: Yes     Comment: 2 drinks per month     Drug use: No     Sexual activity: Yes     Partners: Female   Other Topics Concern     Not on file   Social History Narrative     Not on file     Social Determinants of Health     Financial Resource Strain: Not on file   Food Insecurity: Not on file   Transportation Needs: Not on file   Physical Activity: Not on file   Stress: Not on file   Social Connections: Not on file   Intimate Partner Violence: Not on file   Housing Stability: Not on file

## 2022-12-01 NOTE — LETTER
"12/1/2022    JORDAN SOLOMON MD  [address] [place] [address]    RE: Josafat Madera       Dear Colleague,     I had the pleasure of seeing Josafat Madera in the [facility].         [facility]   [address], [place] [address]   [email or web address]   OFFICE: [phone]          Thank you Jordan Montilla for asking the [facility] team to participate in the care of your patient, Josafat Madera.     Impression and Plan     1.  Coronary artery disease.  \"Kyle\" has known coronary disease having had prior myocardial infarction and percutaneous interventions in 1998 and 2008. On 12 August 2008 after the patient had presented with acute occlusion of the right coronary artery. He had 2 stents placed to the vessel.     \"Kyle\" underwent repeat angiography 14 July 2016 at which time he had PCI with stenting of mid left anterior descending coronary artery ([employer] Synergy SMITHA [id number]) and PCI with stenting of mid circumflex coronary artery ([employer] Synergy SMITHA 3.0x20mm).  See Cardiac Diagnostics section below for details.     \"Kyle\" underwent an exercise stress test 21 December 2018 that was favorable and revealed no evidence of ischemia and normal functional capacity.     In addition, he underwent repeat stress testing 13 October 2020 which again revealed no evidence by ECG of ischemia.  He did come operative, he have some PVCs in the 5 beat NSVT run.     Kyle underwent repeat angiography 4 March 2022  after presenting to [facility] with evidence of a unstable ischemic syndrome.  At that time he had successful PCI with stent deployment to distal right coronary artery (3.5 x 18 mm [place] prior to crux, and a second 4.0 x 16 mm Synergy SMITHA in mid-distal RCA, overlapping proximally with the old mid RCA stent).     Regadenoson MRI 25 October 2022 revealed no evidence of ischemia.    This is stable.  He denies any " "chest pain or other symptoms concerning for angina.     2.  PVCs.       Continue metoprolol.     3.  Hypertension.  Blood pressure is fairly reasonable in the office today at 130/62 mmHg.     4.  Dyslipidemia.  Lipid profile 4 March 2022 revealed LDL 60 mg/dL and HDL 34 mg/dL.    Continue high intensity statin therapy.     Plan on follow-up in approximately one year.     35 minutes spent reviewing prior records (including documentation, laboratory studies, cardiac testing/imaging), interview with patient along with physical exam, planning, and subsequent documentation/crafting of note).           History of Present Illness    Once again I would like to thank you again for asking me to participate in the care of your patient, Josafat Madera.  As you know, but to reiterate for my own records, Josafat Madera is a 71 year old male with known coronary artery disease. Specifically, \"Lola" has known coronary disease having had prior myocardial infarction and percutaneous interventions in 1998 and 2008. On 12 August 2008 after \"Lola"  had presented with acute occlusion of the right coronary artery. He had 2 stents placed to the vessel.     \"Lola" underwent repeat angiography 14 July 2016 at which time he had PCI with stenting of mid left anterior descending coronary artery ([employer] Synergy SMITHA [id number]) and PCI with stenting of mid circumflex coronary artery ([employer] Sy[employer] SMITHA 3.0x20mm).  See Cardiac Diagnostics section below for details.     Kyle underwent an exercise stress test 21 December 2018 that was favorable and revealed no evidence of ischemia and normal functional capacity.     Kyle underwent repeat angiography 4 March 2022 after presenting to [facility] with evidence of a unstable ischemic syndrome.  At that time he had successful PCI with stent deployment to distal right coronary artery (3.5 x 18 mm [place] prior to crux, and a second 4.0 x 16 mm Synergy SMITHA in mid-distal RCA, " "overlapping proximally with the old mid RCA stent).    On interview today, \"Kyle\" states that he has been doing well from a cardiac standpoint.  He specifically denies any chest pain, shortness of breath, or subjective decline in exercise tolerance.  He denies palpitations or lightheadedness.    Further review of systems is otherwise negative/noncontributory (medical record and 13 point review of systems reviewed as well and pertinent positives noted).         Cardiac Diagnostics      Regadenoson MRI 25 October 2022:  1. Pharmacological Regadenoson stress cardiac MRI is negative for inducible myocardial ischemia.   2. Pharmacological stress ECG is negative for inducible myocardial ischemia.   3. Normal left ventricular size, wall thickness with normal ejection fraction. Moderate hypokinesis of the basal inferolateral wall segment. Left ventricular ejection fraction is 60-65% by visual estimation of real time images.   4. There is small area of transmural infarction involving the basal to mid inferolateral wall.   5. Non-transmural infarction of the basal inferoseptal and inferior wall segments of the left ventricle representing viable myocardium.      6. Normal right ventricular size and systolic function.    7. No evidence of significant valvular abnormalities.    Echocardiogram 5 March 2022 (performed at [facility]):  1. Normal left ventricular size and systolic performance with ejection fraction of 65%.  2. Mid basal to mid inferior hypokinesis.  Remaining segments appear hyperdynamic.  3. Mild septal thickening.  4. No significant valvular heart disease.  5. Normal right ventricular size and systolic performance.  6. Atrial level visualized.    Echocardiogram for December 2020 (personally reviewed):  1. Normal left ventricular size and systolic performance with a visually estimated ejection fraction of 60-65%.   2. No significant valvular heart disease is identified on this study.   3. Normal right " ventricular size and systolic performance.   4. Normal atrial dimensions.    Exercise stress test 13 October 2020 (formally reviewed by Dr. Maxx Russell (Ted) and also personally reviewed):  1. No ECG evidence of ischemia.  2. No chest pain symptoms with exercise.  3. Exercise-induced PVCs and short run of nonsustained VT was noted (5 beat).    Exercise stress test 19 December 2018:  1. No ECG evidence of ischemia.  2. No chest pain symptoms reported with exercise.  3. Normal functional capacity.    Nuclear stress perfusion study 1 July 2016 (pre-coronary angiogram which was performed 14 July 2016):  1. There is a large, severe perfusion defect involving the entire lateral wall on the left ventricle that redistributes on the rest images consistent with ischemia.  2. Normal left ventricular systolic performance with ejection fraction of 54% with lateral hypokinesis.    Nuclear stress perfusion study 28 October 2008:  1. Medium-sized area of non-transmural infarction in the inferior wall.  2. Normal left ventricular systolic performance with ejection fraction of 66% with very small area of basal inferior hypokinesis.    Coronary angiogram 4 March 2022 (performed at [facility]).  1. Left main coronary artery: No significant stenosis.  2. Left anterior descending coronary artery: Mid 30-40% stenosis.  Mid 50-60% stenosis.  3. Circumflex coronary artery: Large vessel with mild proximal disease.  4. Right coronary artery: Mild to moderate in-stent restenosis of proximal-mid RCA stent.  50% in-stent restenosis involving proximal right coronary artery stent.  Acute total occlusion of distal right coronary artery just beyond edge of the mid right coronary artery stent.  90% stenosis in the distal right coronary artery prior to the crux.  5. Successful PCI with stent deployment to distal right coronary artery (3.5 x 18 mm [place] prior to crux, and a second 4.0 x 16 mm Synergy SMITHA in mid-distal RCA, overlapping  proximally with the old mid RCA stent).    Coronary angiography 14 July 2016:  1. Left anterior descending coronary artery: Mid 75% stenosis.  2. Left circumflex coronary artery: Mid 90% stenosis with more severe distal stenosis.  3. Right coronary artery: 25% proximal stenosis and 40% distal stenosis.  4. Status post PCI with stenting of mid left anterior descending coronary artery ([employer] Synergy SMITHA [id number]).  5. Status post PCI with stenting of mid circumflex coronary artery ([employer] Sy[employer] SMITHA 3.0x20mm).  o Recommendations:  - Consider medical therapy with possible PCI of distal LCX if symptoms not controlled. However small in caliber and likely too small for stent  - Continue with prasugrel (Effient  ) for 12 months, but if necessary could discontinue earlier at 3 months. If tolerated would advocate 12 month course.    CT coronary angiography 22 May 2014:  1. Left main without significant stenosis.  2. Left anterior descending has a calcified plaque from distal left main and other calcified plaques and soft plaques in the mid left anterior descending which cause moderate stenosis of 50-60%. It appears to be non-obstructive in the left anterior descending.  3. Mild to moderate disease in the left circumflex.  4. There is mild disease in the proximal right coronary artery. The stent in the right coronary artery is patent. The distal right coronary artery stent is not able to be evaluated because of significant artifact.    Coronary angiography 12 August 2008 (performed at [facility]) :  1. Left main coronary artery: Mild irregularities.  2. Left anterior descending coronary artery: 70-80% stenosis at the branch point of the first major diagonal.  3. Circumflex artery with small obtuse marginal branch occlusion.  4. Right coronary artery: Vessel occluded in the proximal third portion. Culprit vessel for myocardial infarction.  5. PCI with stent placement (3.5×12  "mm drug-eluting stent) to right coronary artery.    Holter monitor 22 October 2020:  1. Frequent PVCs.  Most of the PVCs are from a single site, but somecomplexity is noted including frequent couplets and some triplets.              Physical Examination       /62 (BP Location: Left arm, Patient Position: Sitting, Cuff Size: Adult Regular)   Pulse 83   Resp 16   Ht 1.753 m (5' 9\")   Wt 97.3 kg (214 lb 6.4 oz)   SpO2 95%   BMI 31.66 kg/m          Wt Readings from Last 3 Encounters:   12/01/22 97.3 kg (214 lb 6.4 oz)   11/22/22 95.7 kg (211 lb)   07/14/22 95.7 kg (211 lb)     The patient is alert and oriented times three. Sclerae are anicteric. Mucosal membranes are moist. Jugular venous pressure is normal. No significant adenopathy/thyromegally appreciated. Lungs are clear with good expansion. On cardiovascular exam, the patient has a regular S1 and S2. Abdomen is soft and non-tender. Extremities reveal no clubbing, cyanosis, or edema.         Medications  Allergies   Current Outpatient Medications   Medication Sig Dispense Refill     amLODIPine (NORVASC) 5 MG tablet TAKE 1 TABLET(5 MG) BY MOUTH DAILY 90 tablet 3     aspirin 81 MG tablet [ASPIRIN 81 MG TABLET] Take 81 mg by mouth daily.       atorvastatin (LIPITOR) 80 MG tablet TAKE 1/2 TABLET(40 MG) BY MOUTH EVERY EVENING 45 tablet 3     clopidogrel (PLAVIX) 75 MG tablet Take 1 tablet (75 mg) by mouth daily Take 1 tablet (75 mg) by mouth daily 90 tablet 1     hydrochlorothiazide (HYDRODIURIL) 25 MG tablet TAKE 1 TABLET(25 MG) BY MOUTH DAILY 90 tablet 3     lisinopril (ZESTRIL) 20 MG tablet TAKE 1 TABLET BY MOUTH DAILY 180 tablet 1     metoprolol succinate ER (TOPROL XL) 25 MG 24 hr tablet TAKE 1 TABLET(25 MG) BY MOUTH DAILY 90 tablet 3     multivitamin with minerals (THERA-M) 9 mg iron-400 mcg Tab tablet [MULTIVITAMIN WITH MINERALS (THERA-M) 9 MG IRON-400 MCG TAB TABLET] Take 1 tablet by mouth daily.       omeprazole (PRILOSEC) 20 MG capsule [OMEPRAZOLE " (PRILOSEC) 20 MG CAPSULE] Take 20 mg by mouth daily before breakfast.       sildenafil (VIAGRA) 50 MG tablet [SILDENAFIL (VIAGRA) 50 MG TABLET] Take 1 tablet (50 mg total) by mouth daily as needed for erectile dysfunction. 16 tablet 5     No Known Allergies       Lab Results    Chemistry/lipid CBC Cardiac Enzymes/BNP/TSH/INR   Recent Labs   Lab Test 11/29/22  0714   CHOL 159   HDL 49   LDL 81   TRIG 143     Recent Labs   Lab Test 11/29/22  0714 03/26/21  0954 12/16/19  1631   LDL 81 73 82     Recent Labs   Lab Test 11/29/22  0714      POTASSIUM 4.2   CHLORIDE 99   CO2 25   GLC 91   BUN 12.2   CR 0.71   GFRESTIMATED >90   KASI 9.5     Recent Labs   Lab Test 11/29/22  0714 07/14/22  1041 03/26/21  0954   CR 0.71 0.67 0.81     Recent Labs   Lab Test 11/29/22  0714 06/27/19  0820   A1C 5.6 5.2          Recent Labs   Lab Test 11/29/22  0714   WBC 7.9   HGB 15.2   HCT 44.1   MCV 93        Recent Labs   Lab Test 11/29/22  0714 07/14/22  1041 03/26/21  0954   HGB 15.2 13.8 15.0    No results for input(s): TROPONINI in the last 19164 hours.  No results for input(s): BNP, NTBNPI, NTBNP in the last 21427 hours.  Recent Labs   Lab Test 11/29/22  0714   TSH 3.77     No results for input(s): INR in the last 62312 hours.     Medical History  Surgical History Family History Social History   Past Medical History:   Diagnosis Date     Bladder cancer (H)     see [facility] notes     Carpal tunnel syndrome      Cataract      Coronary artery disease      GERD (gastroesophageal reflux disease)      Hypercholesteremia      Hypertension      Lower back pain      Scoliosis      Past Surgical History:   Procedure Laterality Date     ANGIOPLASTY  1998/2008     BLADDER SURGERY  03/01/2014    Polyp removal     HC REMOVAL OF TONSILS,<11 Y/O      Description: Tonsillectomy;  Recorded: 06/10/2008;     HERNIA REPAIR  01/01/2009    Bilateral     PHACOEMULSIFICATION CLEAR CORNEA WITH STANDARD INTRAOCULAR LENS IMPLANT Right 07/10/2014     Procedure: Right Cataract Extraction with Intraocular Lens Implantation;  Surgeon: Karl Nava MD;  Location: [facility];  Service:      PHACOEMULSIFICATION CLEAR CORNEA WITH STANDARD INTRAOCULAR LENS IMPLANT Left 2014    Procedure: CATARACT EXTRACTION WITH INTRAOCULAR LENS IMPLANTATION LEFT EYE;  Surgeon: Karl Nava MD;  Location: [facility];  Service:      right finger surgery Right     Extensor Tendon Repair     TONSILLECTOMY       Family History   Problem Relation Age of Onset     Cerebrovascular Disease Mother      Heart Disease Mother      Heart Disease Father      Crohn's Disease Sister      Diabetes Brother         Social History     Socioeconomic History     Marital status:      Spouse name: Not on file     Number of children: Not on file     Years of education: Not on file     Highest education level: Not on file   Occupational History     Not on file   Tobacco Use     Smoking status: Former     Types: Cigars     Quit date:      Years since quittin.9     Smokeless tobacco: Never     Tobacco comments:     occasional cigar   Substance and Sexual Activity     Alcohol use: Yes     Comment: 2 drinks per month     Drug use: No     Sexual activity: Yes     Partners: Female   Other Topics Concern     Not on file   Social History Narrative     Not on file     Social Determinants of Health     Financial Resource Strain: Not on file   Food Insecurity: Not on file   Transportation Needs: Not on file   Physical Activity: Not on file   Stress: Not on file   Social Connections: Not on file   Intimate Partner Violence: Not on file   Housing Stability: Not on file                      Thank you for allowing me to participate in the care of your patient.      Sincerely,     Heydi Wilkes MD     [facility]  cc:   Heydi Wilkes MD  [address] [place] [address]

## 2023-03-12 DIAGNOSIS — I10 ESSENTIAL HYPERTENSION, BENIGN: ICD-10-CM

## 2023-03-12 RX ORDER — HYDROCHLOROTHIAZIDE 25 MG/1
TABLET ORAL
Qty: 90 TABLET | Refills: 2 | Status: SHIPPED | OUTPATIENT
Start: 2023-03-12 | End: 2023-12-05

## 2023-03-12 RX ORDER — AMLODIPINE BESYLATE 5 MG/1
TABLET ORAL
Qty: 90 TABLET | Refills: 2 | Status: SHIPPED | OUTPATIENT
Start: 2023-03-12 | End: 2023-12-05

## 2023-03-12 NOTE — TELEPHONE ENCOUNTER
"Last Written Prescription Date:  3/2/2022  Last Fill Quantity: 90,  # refills: 3   Last office visit provider:  11/22/2022     Requested Prescriptions   Pending Prescriptions Disp Refills     amLODIPine (NORVASC) 5 MG tablet [Pharmacy Med Name: AMLODIPINE BESYLATE 5MG TABLETS] 90 tablet 3     Sig: TAKE 1 TABLET(5 MG) BY MOUTH DAILY       Calcium Channel Blockers Protocol  Passed - 3/12/2023 12:38 PM        Passed - Blood pressure under 140/90 in past 12 months     BP Readings from Last 3 Encounters:   12/01/22 130/62   11/22/22 132/88   10/25/22 111/75                 Passed - Recent (12 mo) or future (30 days) visit within the authorizing provider's specialty     Patient has had an office visit with the authorizing provider or a provider within the authorizing providers department within the previous 12 mos or has a future within next 30 days. See \"Patient Info\" tab in inbasket, or \"Choose Columns\" in Meds & Orders section of the refill encounter.              Passed - Medication is active on med list        Passed - Patient is age 18 or older        Passed - Normal serum creatinine on file in past 12 months     Recent Labs   Lab Test 11/29/22  0714   CR 0.71       Ok to refill medication if creatinine is low          Last Written Prescription Date:  3/8/2022  Last Fill Quantity: 90,  # refills: 3   Last office visit provider:  11/22/2022        hydrochlorothiazide (HYDRODIURIL) 25 MG tablet [Pharmacy Med Name: HYDROCHLOROTHIAZIDE 25MG TABLETS] 90 tablet 3     Sig: TAKE 1 TABLET(25 MG) BY MOUTH DAILY       Diuretics (Including Combos) Protocol Passed - 3/12/2023  8:47 AM        Passed - Blood pressure under 140/90 in past 12 months     BP Readings from Last 3 Encounters:   12/01/22 130/62   11/22/22 132/88   10/25/22 111/75                 Passed - Recent (12 mo) or future (30 days) visit within the authorizing provider's specialty     Patient has had an office visit with the authorizing provider or a provider " "within the authorizing providers department within the previous 12 mos or has a future within next 30 days. See \"Patient Info\" tab in inbasket, or \"Choose Columns\" in Meds & Orders section of the refill encounter.              Passed - Medication is active on med list        Passed - Patient is age 18 or older        Passed - Normal serum creatinine on file in past 12 months     Recent Labs   Lab Test 11/29/22  0714   CR 0.71              Passed - Normal serum potassium on file in past 12 months     Recent Labs   Lab Test 11/29/22  0714   POTASSIUM 4.2                    Passed - Normal serum sodium on file in past 12 months     Recent Labs   Lab Test 11/29/22  0714                      Tanya Melendrez RN 03/12/23 12:39 PM  "

## 2023-04-10 ENCOUNTER — TELEPHONE (OUTPATIENT)
Dept: CARDIOLOGY | Facility: CLINIC | Age: 73
End: 2023-04-10
Payer: COMMERCIAL

## 2023-04-10 NOTE — TELEPHONE ENCOUNTER
Dr. Wilkes,  Patient is looking for your opinion on leqvio/inclisiran (RADHA-4 clinical trial) and wants to know if you are supportive of him participating in research.   Thank you,  Chey

## 2023-04-10 NOTE — TELEPHONE ENCOUNTER
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 If that would be possible he would greatly appreciate it.      Action Taken: Other: Cardiology    Travel Screening: Not Applicable     Thank you!  [facility]

## 2023-04-12 ENCOUNTER — OFFICE VISIT (OUTPATIENT)
Dept: INTERNAL MEDICINE | Facility: CLINIC | Age: 73
End: 2023-04-12
Payer: COMMERCIAL

## 2023-04-12 VITALS
SYSTOLIC BLOOD PRESSURE: 122 MMHG | RESPIRATION RATE: 16 BRPM | TEMPERATURE: 97.3 F | WEIGHT: 212 LBS | HEIGHT: 69 IN | BODY MASS INDEX: 31.4 KG/M2 | HEART RATE: 88 BPM | OXYGEN SATURATION: 99 % | DIASTOLIC BLOOD PRESSURE: 82 MMHG

## 2023-04-12 DIAGNOSIS — N64.4 NIPPLE PAIN: Primary | ICD-10-CM

## 2023-04-12 DIAGNOSIS — L72.0 INCLUSION CYST: ICD-10-CM

## 2023-04-12 DIAGNOSIS — I78.1 NON-NEOPLASTIC NEVUS: ICD-10-CM

## 2023-04-12 DIAGNOSIS — I10 ESSENTIAL HYPERTENSION, BENIGN: ICD-10-CM

## 2023-04-12 DIAGNOSIS — I25.10 ATHEROSCLEROSIS OF NATIVE CORONARY ARTERY OF NATIVE HEART WITHOUT ANGINA PECTORIS: ICD-10-CM

## 2023-04-12 PROCEDURE — 99213 OFFICE O/P EST LOW 20 MIN: CPT | Performed by: INTERNAL MEDICINE

## 2023-04-12 NOTE — PROGRESS NOTES
Office Visit - Follow Up   Josafat Madera   72 year old male    Date of Visit: 4/12/2023    No chief complaint on file.       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  Kyle first noticed this symptom in approximately 2 months ago around February 2023.  The nipple has been very sensitive to touch, or when this skin gets jostled around, for example when he is walking or jogging.    On examination today April 12, 2023, the nipple appears normal to my eye, and when I palpate I appreciate normal glandular tissue, symmetrical with the left side.    I am requesting that he see the dermatologist because he has some other skin issues (see below), and I asked him to mention this nipple symptom to the dermatologist.    In the meantime, I told him he could try some topical lidocaine gel for symptomatic relief.  Of course, notify me if something changes in terms of the appearance of the nipple, or if a palpable mass becomes evident.    Skin lesions, including a inclusion cyst on the left side of his neck that is around 5 mm, does not seem infected, other cysts and moles, a dry patch on the left part of his back with an inflamed seborrheic keratosis versus wart.  Its been a couple years since he seen a dermatologist, and so lets get him back over there.    Rosio recovered from ST elevation inferior myocardial infarction that was followed by emergency coronary angioplasty and stenting of his right coronary artery, performed March 4, 2022 at [facility].  His presenting symptom was indigestion, which would have been quite characteristic for inferior MI.  Coronary angiography and  revascularization was performed via a right radial artery approach     Coronary artery disease, most recent intervention for right coronary artery occlusion and stents placed March 4, 2022 after inferior ST elevation MI  Previous cardiac history of  frequent PVCs, prior myocardial infarction and coronary interventions in 1998, 2008, and then July 2016 with drug-eluting stents placed into the left anterior descending artery and mid circumflex     Admitted to [facility] on March 4th, 2022 inferior STEMI.   S/p 2 SMITHA dRCA occlusion; residual diffuse ISR or proximal and mRCA stents.  Continues on ASA 81mg every day  Lipitor 40mg QD   clopidogrel (previously Brilinta) and   Toprol 25 mg QD. Dual antiplatelet therapy minimum of one years time.    Recovered from left distal tibial fracture July 12, 2022, which required open reduction internal fixation surgery with placement of 6 or 7 screws, which was able to be done despite his being only 4 months after his heart attack and coronary stent    Swallowing difficulties for dry scratchy texture food such as pretzels and chips, and that may have been the cause of a vasovagal faint after which caused him to fall and triggered the tibial fracture    Hypertension in the context of coronary disease, blood pressures running higher over the last year  Lisinopril 20 mg once a day (reduced from twice a day)  Metoprolol succinate 25 mg once a day  Amlodipine 5 mg once a day  Hydrochlorothiazide 25 mg once a day     BP Readings from Last 6 Encounters:   04/12/23 122/82   12/01/22 130/62   11/22/22 132/88   10/25/22 111/75   07/14/22 122/74   06/29/22 134/75     Advanced osteoarthritis right knee, chronic torn meniscus right knee, which demonstrated on MRI in December 2014, not too symptomatic, but he should continue to work with [facility] on this issue, and also pursue quadriceps muscle strengthening  The 2014 knee MRI demonstrated an extensive complex tear of the posterior horn and body  of the medial meniscus, also degenerative arthritis in the medial and patellofemoral compartments with grade IV chondromalacia, also low-grade sprain or inflammation of the medial collateral ligament.  Even though this was 6 years ago, his knee is holding up reasonably well.  He did stress it yesterday when he was moving a panel.  But today says his knee feels pretty good.  There is a mild to moderate sized effusion on physical exam.  I think he could continue with conservative management strategy, but should at [facility] about learning some home exercises to strengthen his quadriceps, which will help protect his knee, and delay the need for surgery.      Possible carpal tunnel syndrome right hand, mild  He told me he experiences a little bit of tingling in the hand.   I would suggest he try a carpal tunnel Velcro splint, and especially in bed.     History of bladder cancer April 2014, working with Dr. Pedro Guerrero at [facility], getting yearly cystoscopies, has also had intermittent tenderness right testicle.       Flatulence, which very well could be from indigestible vegetables sugars (alpha glycosides), for which I suggested that he try over-the-counter enzyme supplement, Beano or similar.  He noticed more gas after he switched to a predominantly vegetable diet.     Obesity with body mass index 31, likely contributing to blood pressure, would like to see him lose 20 pounds this year.    I like to see him get down around 180 pounds by the end of the year.  I reminded him of the importance of eating slowly, controlling portion size, and identifying problem foods to curtail or eliminate, especially starches, sweets, fried foods.  He told me that alcohol consumption is approximately once a week, so that does not sound like a big source of calories for him.    Wt Readings from Last 5 Encounters:   04/12/23 96.2 kg (212 lb)   12/01/22 97.3 kg (214 lb 6.4 oz)   11/22/22 95.7 kg (211 lb)   07/14/22 95.7 kg (211  lb)   05/03/22 95.7 kg (211 lb)     Hyperlipidemia in the context of coronary disease, on high dose atorvastatin   Lipid profile March 4, 2022 showed good control with LDL 60, HDL bit low at 34, triglycerides okay 123, total cholesterol 119     Chronic low back pain and scoliosis, does home exercises    Had lumbar RFA performed around October 26, 2022--he reported procedure was successful in terms of pain relief  I reminded that it reminded him that he needs to keep doing his home exercises     Droopy eyelids (dermatochalasis) for which he should consult with his ophthalmologist.      Erectile dysfunction, okay to increase sildenafil up to 100 mg dose     Gastroesophageal reflux, not too bothersome.       History of trochanteric bursitis both hips, in remission      Darkly pigmented papules on both sides of his neck, saw [facility]     History of tubular adenoma colon polyp 6 mm sigmoid June 2016, due for recheck this year June 2021. He already received a letter from [facility] to schedule his follow-up colonoscopy for this year.     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  Kyle first noticed this symptom in approximately 2 months ago around February 2023.  The nipple has been very sensitive to touch, or when this skin gets jostled around, for example when he is walking or jogging.    On examination today  April 12, 2023, the nipple appears normal to my eye, and when I palpate I appreciate normal glandular tissue, symmetrical with the left side.    I am requesting that he see the dermatologist because he has some other skin issues (see below), and I asked him to mention this nipple symptom to the dermatologist.    In the meantime, I told him he could try some topical lidocaine gel for symptomatic relief.  Of course, notify me if something changes in terms of the appearance of the nipple, or if a palpable mass becomes evident.      Wt Readings from Last 3 Encounters:   04/12/23 96.2 kg (212 lb)   12/01/22 97.3 kg (214 lb 6.4 oz)   11/22/22 95.7 kg (211 lb)     BP Readings from Last 3 Encounters:   04/12/23 122/82   12/01/22 130/62   11/22/22 132/88     ---------------------------------------------------------------------------------------------------------------------------    Medications, Allergies, Social, and Problem List   Current Outpatient Medications   Medication Sig Dispense Refill     amLODIPine (NORVASC) 5 MG tablet TAKE 1 TABLET(5 MG) BY MOUTH DAILY 90 tablet 2     aspirin 81 MG tablet [ASPIRIN 81 MG TABLET] Take 81 mg by mouth daily.       atorvastatin (LIPITOR) 80 MG tablet TAKE 1/2 TABLET(40 MG) BY MOUTH EVERY EVENING 45 tablet 3     clopidogrel (PLAVIX) 75 MG tablet TAKE 1 TABLET(75 MG) BY MOUTH DAILY 90 tablet 3     hydrochlorothiazide (HYDRODIURIL) 25 MG tablet TAKE 1 TABLET(25 MG) BY MOUTH DAILY 90 tablet 2     lisinopril (ZESTRIL) 20 MG tablet TAKE 1 TABLET BY MOUTH DAILY 180 tablet 1     metoprolol succinate ER (TOPROL XL) 25 MG 24 hr tablet TAKE 1 TABLET(25 MG) BY MOUTH DAILY 90 tablet 3     multivitamin with minerals (THERA-M) 9 mg iron-400 mcg Tab tablet [MULTIVITAMIN WITH MINERALS (THERA-M) 9 MG IRON-400 MCG TAB TABLET] Take 1 tablet by mouth daily.       omeprazole (PRILOSEC) 20 MG capsule [OMEPRAZOLE (PRILOSEC) 20 MG CAPSULE] Take 20 mg by mouth daily before breakfast.       sildenafil  "(VIAGRA) 50 MG tablet [SILDENAFIL (VIAGRA) 50 MG TABLET] Take 1 tablet (50 mg total) by mouth daily as needed for erectile dysfunction. 16 tablet 5     No Known Allergies  Social History     Tobacco Use     Smoking status: Former     Types: Cigars     Quit date:      Years since quittin.3     Smokeless tobacco: Never     Tobacco comments:     occasional cigar   Substance Use Topics     Alcohol use: Yes     Comment: 2 drinks per month     Drug use: No     Patient Active Problem List   Diagnosis     Male Erectile Disorder     Scoliosis     Primary Osteoarthritis Of The Lumbar Vertebrae     Personal history of malignant neoplasm of bladder     Essential Hypercholesterolemia     Benign Essential Hypertension     Coronary Artery Disease     STEMI (ST elevation myocardial infarction) (H)     Carpal Tunnel Syndrome     Chronic Reflux Esophagitis     Joint Pain, Localized In The Knee     Tubular adenoma of colon     Osteoarthritis of right knee     Trochanteric bursitis of both hips        Reviewed, reconciled and updated       Physical Exam   General Appearance:      /82 (BP Location: Right arm, Patient Position: Sitting, Cuff Size: Adult Regular)   Pulse (!) 135   Temp 97.3  F (36.3  C)   Resp 16   Ht 1.753 m (5' 9\")   Wt 96.2 kg (212 lb)   SpO2 99%   BMI 31.31 kg/m      On examination today 2023, the nipple appears normal to my eye, and when I palpate I appreciate normal glandular tissue, symmetrical with the left side.       Additional Information        JORDAN SOLOMON MD, MD          "

## 2023-04-12 NOTE — PATIENT INSTRUCTIONS
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  Kyle first noticed this symptom in approximately 2 months ago around February 2023.  The nipple has been very sensitive to touch, or when this skin gets jostled around, for example when he is walking or jogging.    On examination today April 12, 2023, the nipple appears normal to my eye, and when I palpate I appreciate normal glandular tissue, symmetrical with the left side.    I am requesting that he see the dermatologist because he has some other skin issues (see below), and I asked him to mention this nipple symptom to the dermatologist.    In the meantime, I told him he could try some topical lidocaine gel for symptomatic relief.  Of course, notify me if something changes in terms of the appearance of the nipple, or if a palpable mass becomes evident.    Skin lesions, including a inclusion cyst on the left side of his neck that is around 5 mm, does not seem infected, other cysts and moles, a dry patch on the left part of his back with an inflamed seborrheic keratosis versus wart.  Its been a couple years since he seen a dermatologist, and so lets get him back over there.    Rosio recovered from ST elevation inferior myocardial infarction that was followed by emergency coronary angioplasty and stenting of his right coronary artery, performed March 4, 2022 at [facility].  His presenting symptom was indigestion, which would have been quite characteristic for inferior MI.  Coronary angiography and revascularization was performed via a right radial artery approach     Coronary artery disease, most recent intervention for right coronary artery occlusion and stents placed March 4, 2022 after inferior ST elevation MI  Previous cardiac history of  frequent PVCs, prior  myocardial infarction and coronary interventions in 1998, 2008, and then July 2016 with drug-eluting stents placed into the left anterior descending artery and mid circumflex     Admitted to [facility] on March 4th, 2022 inferior STEMI.   S/p 2 SMITHA dRCA occlusion; residual diffuse ISR or proximal and mRCA stents.  Continues on ASA 81mg every day  Lipitor 40mg QD   clopidogrel (previously Brilinta) and   Toprol 25 mg QD. Dual antiplatelet therapy minimum of one years time.    Recovered from left distal tibial fracture July 12, 2022, which required open reduction internal fixation surgery with placement of 6 or 7 screws, which was able to be done despite his being only 4 months after his heart attack and coronary stent    Swallowing difficulties for dry scratchy texture food such as pretzels and chips, and that may have been the cause of a vasovagal faint after which caused him to fall and triggered the tibial fracture    Hypertension in the context of coronary disease, blood pressures running higher over the last year  Lisinopril 20 mg once a day (reduced from twice a day)  Metoprolol succinate 25 mg once a day  Amlodipine 5 mg once a day  Hydrochlorothiazide 25 mg once a day     BP Readings from Last 6 Encounters:   04/12/23 122/82   12/01/22 130/62   11/22/22 132/88   10/25/22 111/75   07/14/22 122/74   06/29/22 134/75     Advanced osteoarthritis right knee, chronic torn meniscus right knee, which demonstrated on MRI in December 2014, not too symptomatic, but he should continue to work with [facility] on this issue, and also pursue quadriceps muscle strengthening  The 2014 knee MRI demonstrated an extensive complex tear of the posterior horn and body of the medial meniscus, also degenerative arthritis in the medial and patellofemoral compartments with grade IV chondromalacia, also low-grade sprain or inflammation of the medial collateral ligament.  Even though this was 6 years ago, his knee is holding up reasonably  well.  He did stress it yesterday when he was moving a panel.  But today says his knee feels pretty good.  There is a mild to moderate sized effusion on physical exam.  I think he could continue with conservative management strategy, but should at [facility] about learning some home exercises to strengthen his quadriceps, which will help protect his knee, and delay the need for surgery.      Possible carpal tunnel syndrome right hand, mild  He told me he experiences a little bit of tingling in the hand.   I would suggest he try a carpal tunnel Velcro splint, and especially in bed.     History of bladder cancer April 2014, working with Dr. Pedro Guerrero at [facility], getting yearly cystoscopies, has also had intermittent tenderness right testicle.       Flatulence, which very well could be from indigestible vegetables sugars (alpha glycosides), for which I suggested that he try over-the-counter enzyme supplement, Beano or similar.  He noticed more gas after he switched to a predominantly vegetable diet.     Obesity with body mass index 31, likely contributing to blood pressure, would like to see him lose 20 pounds this year.    I like to see him get down around 180 pounds by the end of the year.  I reminded him of the importance of eating slowly, controlling portion size, and identifying problem foods to curtail or eliminate, especially starches, sweets, fried foods.  He told me that alcohol consumption is approximately once a week, so that does not sound like a big source of calories for him.    Wt Readings from Last 5 Encounters:   04/12/23 96.2 kg (212 lb)   12/01/22 97.3 kg (214 lb 6.4 oz)   11/22/22 95.7 kg (211 lb)   07/14/22 95.7 kg (211 lb)   05/03/22 95.7 kg (211 lb)     Hyperlipidemia in the context of coronary disease, on high dose atorvastatin   Lipid profile March 4, 2022 showed good control with LDL 60, HDL bit low at 34, triglycerides okay 123, total cholesterol 119     Chronic low back pain  and scoliosis, does home exercises    Had lumbar RFA performed around October 26, 2022--he reported procedure was successful in terms of pain relief  I reminded that it reminded him that he needs to keep doing his home exercises     Droopy eyelids (dermatochalasis) for which he should consult with his ophthalmologist.      Erectile dysfunction, okay to increase sildenafil up to 100 mg dose     Gastroesophageal reflux, not too bothersome.       History of trochanteric bursitis both hips, in remission      Darkly pigmented papules on both sides of his neck, saw [facility]     History of tubular adenoma colon polyp 6 mm sigmoid June 2016, due for recheck this year June 2021. He already received a letter from [facility] to schedule his follow-up colonoscopy for this year.     Carpal tunnel syndrome and right thumb osteoarthritis, not too bothersome these days      Received a third shot of COVID-19 vaccine Pfizer September 30, 2021     COVID-19 Vaccine Bivalent Booster 12+ (Pfizer) 09/13/2022

## 2023-04-13 ENCOUNTER — TELEPHONE (OUTPATIENT)
Dept: INTERNAL MEDICINE | Facility: CLINIC | Age: 73
End: 2023-04-13

## 2023-04-13 ENCOUNTER — VIRTUAL VISIT (OUTPATIENT)
Dept: URGENT CARE | Facility: CLINIC | Age: 73
End: 2023-04-13
Payer: COMMERCIAL

## 2023-04-13 DIAGNOSIS — U07.1 CLINICAL DIAGNOSIS OF COVID-19: Primary | ICD-10-CM

## 2023-04-13 PROCEDURE — 99213 OFFICE O/P EST LOW 20 MIN: CPT | Mod: CS

## 2023-04-13 NOTE — TELEPHONE ENCOUNTER
COVID Positive/Requesting COVID treatment    Patient is positive for COVID and requesting treatment options.    Date of positive COVID test (PCR or at home)? 4/12/2023  Current COVID symptoms: cough, fatigue, headache and congestion or runny nose  Date COVID symptoms began: 4/12/2023    Message should be routed to clinic RN pool. Best phone number to use for call back: [phone]    Patient was in clinic yesterday for appointment with Dr. Wilkerson. He also just wanted to let him know that he tested positive for covid yesterday night.

## 2023-04-13 NOTE — PROGRESS NOTES
"Kyle is a 72 year old who is being evaluated via a billable phone visit.      Sx started last night 4/13.  Sx started yesterday AM.    Wife + 7-8 days ago. On Paxlovid  Patient on Plavix. CAD/stent    Assessment & Plan     Clinical diagnosis of COVID-19    - molnupiravir (LAGEVRIO) 200 MG capsule; Take 4 capsules (800 mg) by mouth every 12 hours for 5 days    COVID-19 positive patient.  Encounter for consideration of medication intervention. Patient does qualify for a prescription. Full discussion with patient including medication options, risks and benefits. Potential drug interactions reviewed with patient.     Treatment Planned Molnupiravir RX sent to [facility]    Temporary change to home medications:  None     Estimated body mass index is 31.31 kg/m  as calculated from the following:    Height as of 4/12/23: 1.753 m (5' 9\").    Weight as of 4/12/23: 96.2 kg (212 lb).  GFR Estimate   Date Value Ref Range Status   11/29/2022 >90 >60 mL/min/1.73m2 Final     Comment:     Effective December 21, 2021 eGFRcr in adults is calculated using the 2021 CKD-EPI creatinine equation which includes age and gender (Richy et al., NEJ, DOI: 10.1056/XUROcj6990808)   03/26/2021 >60 >60 mL/min/1.73m2 Final     Laura Loera MD  Virtual Urgent Care  [facility] VIRTUAL URGENT CARE    Subjective   Kyle is a 72 year old, presenting for the following health issues:  No chief complaint on file.        4/12/2023    10:27 AM   Additional Questions   Roomed by Laura MANCERA     Sx started last night 4/13.  Sx started yesterday AM.    Wife + 7-8 days ago. On Paxlovid  Patient on Plavix. CAD/stent        Review of Systems   Constitutional, HEENT, cardiovascular, pulmonary, GI, , musculoskeletal, neuro, skin, endocrine and psych systems are negative, except as otherwise noted.      Objective           Vitals:  No vitals were obtained today due to virtual visit.    Physical Exam   GENERAL: Healthy, alert and no " distress  RESP: No audible wheeze, coughPSYCH: Mentation appears normal, affect normal/bright    Phone call duration # 6 minutes.

## 2023-04-19 ENCOUNTER — TRANSFERRED RECORDS (OUTPATIENT)
Dept: HEALTH INFORMATION MANAGEMENT | Facility: CLINIC | Age: 73
End: 2023-04-19
Payer: COMMERCIAL

## 2023-04-21 NOTE — TELEPHONE ENCOUNTER
Noted.  ---------------------------  Heydi Wilkes MD  You; Jack Flynn 34 minutes ago (2:36 PM)     FR Brooks Guerrier, brooks Santiago,   I was able to connect with Kyle about the study.  He states that he would rather defer participating.  Just wanted to give you both an update.  Thanks.

## 2023-06-23 ENCOUNTER — TRANSFERRED RECORDS (OUTPATIENT)
Dept: HEALTH INFORMATION MANAGEMENT | Facility: CLINIC | Age: 73
End: 2023-06-23
Payer: COMMERCIAL

## 2023-07-19 ENCOUNTER — TRANSFERRED RECORDS (OUTPATIENT)
Dept: HEALTH INFORMATION MANAGEMENT | Facility: CLINIC | Age: 73
End: 2023-07-19
Payer: COMMERCIAL

## 2023-08-04 DIAGNOSIS — I10 ESSENTIAL HYPERTENSION, BENIGN: ICD-10-CM

## 2023-08-04 RX ORDER — LISINOPRIL 20 MG/1
20 TABLET ORAL DAILY
Qty: 180 TABLET | Refills: 1 | Status: SHIPPED | OUTPATIENT
Start: 2023-08-04 | End: 2024-07-02

## 2023-09-15 ENCOUNTER — TRANSFERRED RECORDS (OUTPATIENT)
Dept: HEALTH INFORMATION MANAGEMENT | Facility: CLINIC | Age: 73
End: 2023-09-15
Payer: COMMERCIAL

## 2023-09-15 ENCOUNTER — TRANSCRIBE ORDERS (OUTPATIENT)
Dept: OTHER | Age: 73
End: 2023-09-15

## 2023-09-15 ENCOUNTER — TELEPHONE (OUTPATIENT)
Dept: NEUROSURGERY | Facility: CLINIC | Age: 73
End: 2023-09-15
Payer: COMMERCIAL

## 2023-09-15 DIAGNOSIS — G95.20 CORD COMPRESSION (H): Primary | ICD-10-CM

## 2023-09-15 DIAGNOSIS — G95.19 EDEMA OF SPINAL CORD (H): ICD-10-CM

## 2023-09-15 NOTE — TELEPHONE ENCOUNTER
Ref by  Dr Parag Ji @ [facility]  P: [phone]  F: [phone]    Please be aware that coverage of these services is subject to the terms and limitations of your health insurance plan.  Call member services at your health plan with any benefit or coverage questions.  Please call to schedule your appointment            Order Questions    Question Answer   Preferred Location: [facility]   Scheduling Instructions: Please call to schedule your appointment   My Clinical Question Is: cord compression, edema of spinal cord

## 2023-09-18 ENCOUNTER — OFFICE VISIT (OUTPATIENT)
Dept: NEUROSURGERY | Facility: CLINIC | Age: 73
End: 2023-09-18
Payer: COMMERCIAL

## 2023-09-18 VITALS
HEART RATE: 82 BPM | BODY MASS INDEX: 31.56 KG/M2 | OXYGEN SATURATION: 97 % | HEIGHT: 69 IN | DIASTOLIC BLOOD PRESSURE: 82 MMHG | SYSTOLIC BLOOD PRESSURE: 138 MMHG | WEIGHT: 213.06 LBS

## 2023-09-18 DIAGNOSIS — G95.20 CORD COMPRESSION (H): ICD-10-CM

## 2023-09-18 DIAGNOSIS — G95.19 EDEMA OF SPINAL CORD (H): ICD-10-CM

## 2023-09-18 PROCEDURE — 99213 OFFICE O/P EST LOW 20 MIN: CPT | Performed by: NEUROLOGICAL SURGERY

## 2023-09-18 ASSESSMENT — PAIN SCALES - GENERAL: PAINLEVEL: MODERATE PAIN (5)

## 2023-09-18 NOTE — NURSING NOTE
"Josafat Madera is a 72 year old male who presents for:  Chief Complaint   Patient presents with    Consult     SCOLIOSIS        Initial Vitals:  /82   Pulse 82   Ht 1.753 m (5' 9\")   Wt 96.6 kg (213 lb 1 oz)   SpO2 97%   BMI 31.46 kg/m   Estimated body mass index is 31.46 kg/m  as calculated from the following:    Height as of this encounter: 1.753 m (5' 9\").    Weight as of this encounter: 96.6 kg (213 lb 1 oz).. Body surface area is 2.17 meters squared. BP completed using cuff size: regular  Moderate Pain (5)        Eva Quesada   "

## 2023-09-18 NOTE — PROGRESS NOTES
Date of Service: 9/18/2023    Josafat Madera is a very pleasant 72 year old male who presents to our clinic for a consultation requested by [facility] for an evaluation of low back pain and abnormal findings on the recent MRI.  The patient has had low back pain chronically (R>L) but he still remains active.  He is an avid golfer although he uses a cart because he has trouble walking for more than 10 minutes.  Sitting and lying down alleviates his pain.  He also has pain in both hip areas but it does n ot radiate down the legs.  He previously had a L3-5 RFA which did not give him any relief although the diagnostic injection with lidocaine did give him some temporary relief.     When he up and walking, his pain level is 6-7 out of 10 but when he sits, the pain is relieved and he can walk again.      .  Past Medical History:   Diagnosis Date     Bladder cancer (H)     see [facility] notes     Carpal tunnel syndrome      Cataract      Coronary artery disease      GERD (gastroesophageal reflux disease)      Hypercholesteremia      Hypertension      Lower back pain      Scoliosis      Past Surgical History:   Procedure Laterality Date     ANGIOPLASTY  1998/2008     BLADDER SURGERY  03/01/2014    Polyp removal     HC REMOVAL OF TONSILS,<11 Y/O      Description: Tonsillectomy;  Recorded: 06/10/2008;     HERNIA REPAIR  01/01/2009    Bilateral     PHACOEMULSIFICATION CLEAR CORNEA WITH STANDARD INTRAOCULAR LENS IMPLANT Right 07/10/2014    Procedure: Right Cataract Extraction with Intraocular Lens Implantation;  Surgeon: Karl Nava MD;  Location: [facility];  Service:      PHACOEMULSIFICATION CLEAR CORNEA WITH STANDARD INTRAOCULAR LENS IMPLANT Left 08/14/2014    Procedure: CATARACT EXTRACTION WITH INTRAOCULAR LENS IMPLANTATION LEFT EYE;  Surgeon: Karl Nava MD;  Location: [facility];  Service:      right finger surgery Right     Extensor Tendon Repair     TONSILLECTOMY       Current Outpatient  Medications   Medication     amLODIPine (NORVASC) 5 MG tablet     aspirin 81 MG tablet     atorvastatin (LIPITOR) 80 MG tablet     clopidogrel (PLAVIX) 75 MG tablet     hydrochlorothiazide (HYDRODIURIL) 25 MG tablet     lisinopril (ZESTRIL) 20 MG tablet     metoprolol succinate ER (TOPROL XL) 25 MG 24 hr tablet     multivitamin with minerals (THERA-M) 9 mg iron-400 mcg Tab tablet     omeprazole (PRILOSEC) 20 MG capsule     sildenafil (VIAGRA) 50 MG tablet     No current facility-administered medications for this visit.     He has no weakness or numbness.  DTRs are 2+ in the patellas.  He has some difficulty performing heel-to-toe tandem walk.    I have personally reviewed his lumbar MRI (5/2022) which shows severely degenerative disk disease with collapsed disk height at L2-3, L1-2, T12-L1 and T11-12.  There is a moderate stenosis at T11-12 with indentation of the ventral cord.              Impression/Plan:    Josafat Madera is a 72 year old male with a history of chronic low back pain and bilateral hip pain that worsens with prolonged standing or walking.   On the EOS xrays, he has PI of 56 and PT of 25.  Scoliosis xrays show coronal imbalance as well as false Roussouly type 1 with short lordosis from L3-S1.  Because of the Roussouly type 1, it is not surprising that he has severe degenerative disk disease in the upper lumbar spine with scoliosis.   Given his previous lumbar MRI which showed possible stenosis at T11-12, we need to repeat the MRI for any interval changes.  He has some difficulty performing heel-to-toe tandem walk.      Because of the false Roussouly type 1 and given his current functional level, any surgery to address his low back pain would have to be the very last resort unless there is a progression of the spinal cord compression.    I will follow-up with him after obtaining new thoracic and lumbar spine MRI.  The orders have been placed.    Jhony Soto MD

## 2023-09-18 NOTE — TELEPHONE ENCOUNTER
Images recvd from [place] but MR was done at [facility]. [facility] called and they are pushing images

## 2023-09-18 NOTE — LETTER
9/18/2023       RE: Josafat Madera  [id number] [facility] [address]     Dear Colleague,    Thank you for referring your patient, Josafat Madera, to the  [facility]. Please see a copy of my visit note below.    Date of Service: 9/18/2023    Josafat Madera is a very pleasant 72 year old male who presents to our clinic for a consultation requested by [facility] for an evaluation of low back pain and abnormal findings on the recent MRI.  The patient has had low back pain chronically (R>L) but he still remains active.  He is an avid golfer although he uses a cart because he has trouble walking for more than 10 minutes.  Sitting and lying down alleviates his pain.  He also has pain in both hip areas but it does n ot radiate down the legs.  He previously had a L3-5 RFA which did not give him any relief although the diagnostic injection with lidocaine did give him some temporary relief.     When he up and walking, his pain level is 6-7 out of 10 but when he sits, the pain is relieved and he can walk again.      .  Past Medical History:   Diagnosis Date    [facility]     [facility]    Carpal tunnel syndrome     Cataract     Coronary artery disease     GERD (gastroesophageal reflux disease)     Hypercholesteremia     Hypertension     Lower back pain     Scoliosis      Past Surgical History:   Procedure Laterality Date    ANGIOPLASTY  1998/2008    BLADDER SURGERY  03/01/2014    Polyp removal    HC REMOVAL OF TONSILS,<13 Y/O      Description: Tonsillectomy;  Recorded: 06/10/2008;    HERNIA REPAIR  01/01/2009    Bilateral    PHACOEMULSIFICATION CLEAR CORNEA WITH STANDARD INTRAOCULAR LENS IMPLANT Right 07/10/2014    Procedure: Right Cataract Extraction with Intraocular Lens Implantation;  Surgeon: Karl Nava MD;  Location: [facility];  Service:     PHACOEMULSIFICATION CLEAR CORNEA WITH STANDARD INTRAOCULAR  LENS IMPLANT Left 08/14/2014    Procedure: CATARACT EXTRACTION WITH INTRAOCULAR LENS IMPLANTATION LEFT EYE;  Surgeon: Karl Nava MD;  Location: [facility];  Service:     right finger surgery Right     Extensor Tendon Repair    TONSILLECTOMY       Current Outpatient Medications   Medication    amLODIPine (NORVASC) 5 MG tablet    aspirin 81 MG tablet    atorvastatin (LIPITOR) 80 MG tablet    clopidogrel (PLAVIX) 75 MG tablet    hydrochlorothiazide (HYDRODIURIL) 25 MG tablet    lisinopril (ZESTRIL) 20 MG tablet    metoprolol succinate ER (TOPROL XL) 25 MG 24 hr tablet    multivitamin with minerals (THERA-M) 9 mg iron-400 mcg Tab tablet    omeprazole (PRILOSEC) 20 MG capsule    sildenafil (VIAGRA) 50 MG tablet     No current facility-administered medications for this visit.     He has no weakness or numbness.  DTRs are 2+ in the patellas.  He has some difficulty performing heel-to-toe tandem walk.    I have personally reviewed his lumbar MRI (5/2022) which shows severely degenerative disk disease with collapsed disk height at L2-3, L1-2, T12-L1 and T11-12.  There is a moderate stenosis at T11-12 with indentation of the ventral cord.              Impression/Plan:    Josafat Madera is a 72 year old male with a history of chronic low back pain and bilateral hip pain that worsens with prolonged standing or walking.   On the EOS xrays, he has PI of 56 and PT of 25.  Scoliosis xrays show coronal imbalance as well as false Roussouly type 1 with short lordosis from L3-S1.  Because of the Roussouly type 1, it is not surprising that he has severe degenerative disk disease in the upper lumbar spine with scoliosis.   Given his previous lumbar MRI which showed possible stenosis at T11-12, we need to repeat the MRI for any interval changes.  He has some difficulty performing heel-to-toe tandem walk.      Because of the false Roussouly type 1 and given his current functional level, any surgery to address his low back pain would  have to be the very last resort unless there is a progression of the spinal cord compression.    I will follow-up with him after obtaining new thoracic and lumbar spine MRI.  The orders have been placed.        Again, thank you for allowing me to participate in the care of your patient.      Sincerely,    Jhony Soto MD

## 2023-09-19 ENCOUNTER — TELEPHONE (OUTPATIENT)
Dept: NEUROSURGERY | Facility: CLINIC | Age: 73
End: 2023-09-19
Payer: COMMERCIAL

## 2023-09-19 NOTE — TELEPHONE ENCOUNTER
[facility] Call Center    Phone Message    May a detailed message be left on voicemail: yes     Reason for Call: Pt is requesting a call back to schedule an appointment with Dr. Soto.  Thanks.

## 2023-10-06 ENCOUNTER — PATIENT OUTREACH (OUTPATIENT)
Dept: GASTROENTEROLOGY | Facility: CLINIC | Age: 73
End: 2023-10-06
Payer: COMMERCIAL

## 2023-10-10 DIAGNOSIS — E78.00 PURE HYPERCHOLESTEROLEMIA: ICD-10-CM

## 2023-10-10 RX ORDER — ATORVASTATIN CALCIUM 80 MG/1
TABLET, FILM COATED ORAL
Qty: 45 TABLET | Refills: 3 | Status: SHIPPED | OUTPATIENT
Start: 2023-10-10 | End: 2024-09-30

## 2023-10-13 ENCOUNTER — OFFICE VISIT (OUTPATIENT)
Dept: NEUROSURGERY | Facility: CLINIC | Age: 73
End: 2023-10-13
Payer: COMMERCIAL

## 2023-10-13 ENCOUNTER — ANCILLARY PROCEDURE (OUTPATIENT)
Dept: MRI IMAGING | Facility: CLINIC | Age: 73
End: 2023-10-13
Attending: NEUROLOGICAL SURGERY
Payer: COMMERCIAL

## 2023-10-13 VITALS
HEIGHT: 69 IN | DIASTOLIC BLOOD PRESSURE: 79 MMHG | RESPIRATION RATE: 16 BRPM | HEART RATE: 84 BPM | BODY MASS INDEX: 31.4 KG/M2 | SYSTOLIC BLOOD PRESSURE: 112 MMHG | OXYGEN SATURATION: 95 % | WEIGHT: 212 LBS

## 2023-10-13 DIAGNOSIS — M51.9 THORACIC DISC DISORDER: Primary | ICD-10-CM

## 2023-10-13 DIAGNOSIS — G95.20 CORD COMPRESSION (H): ICD-10-CM

## 2023-10-13 PROCEDURE — 72148 MRI LUMBAR SPINE W/O DYE: CPT | Mod: GC | Performed by: RADIOLOGY

## 2023-10-13 PROCEDURE — 72146 MRI CHEST SPINE W/O DYE: CPT | Mod: GC | Performed by: RADIOLOGY

## 2023-10-13 PROCEDURE — 99213 OFFICE O/P EST LOW 20 MIN: CPT | Performed by: NEUROLOGICAL SURGERY

## 2023-10-13 ASSESSMENT — PAIN SCALES - GENERAL: PAINLEVEL: MODERATE PAIN (5)

## 2023-10-13 NOTE — PROGRESS NOTES
Date of service: 10/13/2023    Josafat Madera is a 72-year-old male who was seen in my clinic a month ago for evaluation of low back pain as well as abnormal findings on the lumbar MRI scan.  To better assess the thoracic disc herniation he had a dedicated thoracic spine MRI scan today and presents for evaluation.      Since the last visit he has had no significant neurological changes such as weakness or numbness.    I personally reviewed the thoracic spine MRI scan which shows a right-sided disc protrusion at T11-12 indenting the spinal cord.  There is a central disc protrusion at C8-9 also indenting the spinal cord centrally.  There is a thoracic spondylosis at T9-10 as well with some mild stenosis.    Impression/plan:  The MRI findings of the both lumbar and thoracic spines were explained to Mr. Madera.  Currently he is without any neurological deficits except for some low back pain.  We did go over some of the symptoms that he should watch out for such as legs giving way, weakness, numbness, or gait/balance difficulty.  He will call me for reevaluation if he were to experience the symptoms.  For now I told him that that he should go ahead with radiofrequency ablation at his pain clinic for management of his low back pain.    Jhony Soto MD

## 2023-10-13 NOTE — LETTER
10/13/2023       RE: Josafat Madera  [id number] [facility] [address]     Dear Colleague,    Thank you for referring your patient, Josafat Madera, to the [facility] [place] at [facility]. Please see a copy of my visit note below.    Date of service: 10/13/2023    Josafat Madera is a 72-year-old male who was seen in my clinic a month ago for evaluation of low back pain as well as abnormal findings on the lumbar MRI scan.  To better assess the thoracic disc herniation he had a dedicated thoracic spine MRI scan today and presents for evaluation.      Since the last visit he has had no significant neurological changes such as weakness or numbness.    I personally reviewed the thoracic spine MRI scan which shows a right-sided disc protrusion at T11-12 indenting the spinal cord.  There is a central disc protrusion at C8-9 also indenting the spinal cord centrally.  There is a thoracic spondylosis at T9-10 as well with some mild stenosis.    Impression/plan:  The MRI findings of the both lumbar and thoracic spines were explained to Mr. Madera.  Currently he is without any neurological deficits except for some low back pain.  We did go over some of the symptoms that he should watch out for such as legs giving way, weakness, numbness, or gait/balance difficulty.  He will call me for reevaluation if he were to experience the symptoms.  For now I told him that that he should go ahead with radiofrequency ablation at his pain clinic for management of his low back pain.      Again, thank you for allowing me to participate in the care of your patient.      Sincerely,    Jhony Soto MD

## 2023-12-01 ENCOUNTER — TRANSFERRED RECORDS (OUTPATIENT)
Dept: HEALTH INFORMATION MANAGEMENT | Facility: CLINIC | Age: 73
End: 2023-12-01
Payer: COMMERCIAL

## 2023-12-05 DIAGNOSIS — I10 ESSENTIAL HYPERTENSION, BENIGN: ICD-10-CM

## 2023-12-05 RX ORDER — HYDROCHLOROTHIAZIDE 25 MG/1
TABLET ORAL
Qty: 90 TABLET | OUTPATIENT
Start: 2023-12-05

## 2023-12-05 RX ORDER — HYDROCHLOROTHIAZIDE 25 MG/1
TABLET ORAL
Qty: 60 TABLET | Refills: 0 | Status: SHIPPED | OUTPATIENT
Start: 2023-12-05 | End: 2024-01-30

## 2023-12-05 RX ORDER — AMLODIPINE BESYLATE 5 MG/1
TABLET ORAL
Qty: 60 TABLET | Refills: 0 | Status: SHIPPED | OUTPATIENT
Start: 2023-12-05 | End: 2024-01-30

## 2023-12-05 RX ORDER — AMLODIPINE BESYLATE 5 MG/1
TABLET ORAL
Qty: 90 TABLET | OUTPATIENT
Start: 2023-12-05

## 2024-01-09 DIAGNOSIS — I10 ESSENTIAL HYPERTENSION: ICD-10-CM

## 2024-01-09 DIAGNOSIS — I21.11 ST ELEVATION MYOCARDIAL INFARCTION INVOLVING RIGHT CORONARY ARTERY (H): ICD-10-CM

## 2024-01-09 DIAGNOSIS — I25.10 CORONARY ARTERY DISEASE INVOLVING NATIVE CORONARY ARTERY OF NATIVE HEART WITHOUT ANGINA PECTORIS: ICD-10-CM

## 2024-01-09 RX ORDER — METOPROLOL SUCCINATE 25 MG/1
TABLET, EXTENDED RELEASE ORAL
Qty: 90 TABLET | Refills: 1 | Status: SHIPPED | OUTPATIENT
Start: 2024-01-09 | End: 2024-07-02

## 2024-01-09 RX ORDER — CLOPIDOGREL BISULFATE 75 MG/1
TABLET ORAL
Qty: 90 TABLET | Refills: 1 | Status: SHIPPED | OUTPATIENT
Start: 2024-01-09 | End: 2024-07-08

## 2024-01-30 DIAGNOSIS — I10 ESSENTIAL HYPERTENSION, BENIGN: ICD-10-CM

## 2024-01-30 RX ORDER — AMLODIPINE BESYLATE 5 MG/1
TABLET ORAL
Qty: 90 TABLET | Refills: 1 | Status: SHIPPED | OUTPATIENT
Start: 2024-01-30 | End: 2024-07-01

## 2024-01-30 RX ORDER — HYDROCHLOROTHIAZIDE 25 MG/1
TABLET ORAL
Qty: 90 TABLET | Refills: 1 | Status: SHIPPED | OUTPATIENT
Start: 2024-01-30 | End: 2024-07-01

## 2024-02-13 ENCOUNTER — MEDICAL CORRESPONDENCE (OUTPATIENT)
Dept: HEALTH INFORMATION MANAGEMENT | Facility: CLINIC | Age: 74
End: 2024-02-13
Payer: COMMERCIAL

## 2024-02-14 ENCOUNTER — MEDICAL CORRESPONDENCE (OUTPATIENT)
Dept: SCHEDULING | Facility: CLINIC | Age: 74
End: 2024-02-14
Payer: COMMERCIAL

## 2024-03-02 ENCOUNTER — HEALTH MAINTENANCE LETTER (OUTPATIENT)
Age: 74
End: 2024-03-02

## 2024-03-19 SDOH — HEALTH STABILITY: PHYSICAL HEALTH: ON AVERAGE, HOW MANY DAYS PER WEEK DO YOU ENGAGE IN MODERATE TO STRENUOUS EXERCISE (LIKE A BRISK WALK)?: 3 DAYS

## 2024-03-19 SDOH — HEALTH STABILITY: PHYSICAL HEALTH: ON AVERAGE, HOW MANY MINUTES DO YOU ENGAGE IN EXERCISE AT THIS LEVEL?: 40 MIN

## 2024-03-19 ASSESSMENT — SOCIAL DETERMINANTS OF HEALTH (SDOH): HOW OFTEN DO YOU GET TOGETHER WITH FRIENDS OR RELATIVES?: THREE TIMES A WEEK

## 2024-03-25 ENCOUNTER — TRANSFERRED RECORDS (OUTPATIENT)
Dept: HEALTH INFORMATION MANAGEMENT | Facility: CLINIC | Age: 74
End: 2024-03-25
Payer: COMMERCIAL

## 2024-03-26 ENCOUNTER — HOSPITAL ENCOUNTER (OUTPATIENT)
Dept: ULTRASOUND IMAGING | Facility: CLINIC | Age: 74
Discharge: HOME | End: 2024-03-26
Attending: UROLOGY | Admitting: UROLOGY
Payer: COMMERCIAL

## 2024-03-26 ENCOUNTER — OFFICE VISIT (OUTPATIENT)
Dept: INTERNAL MEDICINE | Facility: CLINIC | Age: 74
End: 2024-03-26
Payer: COMMERCIAL

## 2024-03-26 VITALS
TEMPERATURE: 97.8 F | HEIGHT: 69 IN | WEIGHT: 217 LBS | SYSTOLIC BLOOD PRESSURE: 128 MMHG | RESPIRATION RATE: 16 BRPM | OXYGEN SATURATION: 97 % | DIASTOLIC BLOOD PRESSURE: 80 MMHG | BODY MASS INDEX: 32.14 KG/M2 | HEART RATE: 86 BPM

## 2024-03-26 DIAGNOSIS — E78.2 MIXED DYSLIPIDEMIA: ICD-10-CM

## 2024-03-26 DIAGNOSIS — Z00.00 ROUTINE GENERAL MEDICAL EXAMINATION AT A HEALTH CARE FACILITY: Primary | ICD-10-CM

## 2024-03-26 DIAGNOSIS — I25.10 ATHEROSCLEROSIS OF NATIVE CORONARY ARTERY OF NATIVE HEART WITHOUT ANGINA PECTORIS: ICD-10-CM

## 2024-03-26 DIAGNOSIS — E66.09 CLASS 1 OBESITY DUE TO EXCESS CALORIES WITHOUT SERIOUS COMORBIDITY WITH BODY MASS INDEX (BMI) OF 31.0 TO 31.9 IN ADULT: ICD-10-CM

## 2024-03-26 DIAGNOSIS — E78.00 PURE HYPERCHOLESTEROLEMIA: ICD-10-CM

## 2024-03-26 DIAGNOSIS — Z13.1 SCREENING FOR DIABETES MELLITUS: ICD-10-CM

## 2024-03-26 DIAGNOSIS — Z86.39 PERSONAL HISTORY OF OTHER ENDOCRINE, NUTRITIONAL AND METABOLIC DISEASE: ICD-10-CM

## 2024-03-26 DIAGNOSIS — Z12.5 SCREENING PSA (PROSTATE SPECIFIC ANTIGEN): ICD-10-CM

## 2024-03-26 DIAGNOSIS — M25.60 GENERALIZED STIFFNESS: ICD-10-CM

## 2024-03-26 DIAGNOSIS — E66.811 CLASS 1 OBESITY DUE TO EXCESS CALORIES WITHOUT SERIOUS COMORBIDITY WITH BODY MASS INDEX (BMI) OF 31.0 TO 31.9 IN ADULT: ICD-10-CM

## 2024-03-26 DIAGNOSIS — E03.9 HYPOTHYROIDISM, UNSPECIFIED TYPE: ICD-10-CM

## 2024-03-26 DIAGNOSIS — I10 ESSENTIAL HYPERTENSION, BENIGN: ICD-10-CM

## 2024-03-26 DIAGNOSIS — N45.1 EPIDIDYMITIS: ICD-10-CM

## 2024-03-26 PROCEDURE — 76870 US EXAM SCROTUM: CPT

## 2024-03-26 PROCEDURE — 93976 VASCULAR STUDY: CPT

## 2024-03-26 PROCEDURE — G0439 PPPS, SUBSEQ VISIT: HCPCS | Performed by: INTERNAL MEDICINE

## 2024-03-26 NOTE — PROGRESS NOTES
Preventive Care Visit  [facility]  JORDAN SOLOMON MD, Internal Medicine  Mar 26, 2024      Subjective   Kyle is a 73 year old, presenting for the following:  Wellness Visit (Pt is not fasting)        3/26/2024     7:36 AM   Additional Questions   Roomed by Queenie CAGE     Health Care Directive  Patient does not have a Health Care Directive or Living Will: Patient states has Advance Directive and will bring in a copy to clinic.      HPI        3/19/2024   General Health   How would you rate your overall physical health? (!) FAIR   Feel stress (tense, anxious, or unable to sleep) Only a little   (!) STRESS CONCERN      3/19/2024   Nutrition   Diet: Vegetarian/vegan         3/19/2024   Exercise   Days per week of moderate/strenous exercise 3 days   Average minutes spent exercising at this level 40 min         3/19/2024   Social Factors   Frequency of gathering with friends or relatives Three times a week   Worry food won't last until get money to buy more No   Food not last or not have enough money for food? No   Do you have housing?  Yes   Are you worried about losing your housing? No   Lack of transportation? No   Unable to get utilities (heat,electricity)? No         3/25/2024   Fall Risk   Fallen 2 or more times in the past year? No   Trouble with walking or balance? Yes           3/19/2024   Activities of Daily Living- Home Safety   Needs help with the following daily activites None of the above   Safety concerns in the home None of the above         3/19/2024   Dental   Dentist two times every year? Yes         3/19/2024   Hearing Screening   Hearing concerns? (!) I FEEL THAT PEOPLE ARE MUMBLING OR NOT SPEAKING CLEARLY.    (!) I NEED TO ASK PEOPLE TO SPEAK UP OR REPEAT THEMSELVES.    (!) IT'S HARD TO FOLLOW A CONVERSATION IN A NOISY RESTAURANT OR CROWDED ROOM.    (!) TROUBLE UNDESTANDING A SPEAKER IN A PUBLIC MEETING OR [facility]ICE.         3/19/2024   Driving Risk Screening    Patient/family members have concerns about driving No         3/19/2024   General Alertness/Fatigue Screening   Have you been more tired than usual lately? (!) YES         3/19/2024   Urinary Incontinence Screening   Bothered by leaking urine in past 6 months Yes         3/19/2024   TB Screening   Were you born outside of the [place]? No         Today's PHQ-2 Score:       3/25/2024     9:45 AM   PHQ-2 (  [employer])   Q1: Little interest or pleasure in doing things 0   Q2: Feeling down, depressed or hopeless 0   PHQ-2 Score 0   Q1: Little interest or pleasure in doing things Not at all   Q2: Feeling down, depressed or hopeless Not at all   PHQ-2 Score 0           3/19/2024   Substance Use   Alcohol more than 3/day or more than 7/wk No   Do you have a current opioid prescription? No   How severe/bad is pain from 1 to 10? 6/10   Do you use any other substances recreationally? No     Social History     Tobacco Use    Smoking status: Former     Types: Cigars     Quit date:      Years since quittin.2     Passive exposure: Never    Smokeless tobacco: Never    Tobacco comments:     occasional cigar   Vaping Use    Vaping Use: Never used   Substance Use Topics    Alcohol use: Yes     Comment: 2 drinks per month    Drug use: No       ASCVD Risk   The ASCVD Risk score (Erin DK, et al., 2019) failed to calculate for the following reasons:    The patient has a prior MI or stroke diagnosis    Reviewed and updated as needed this visit by Provider    Current providers sharing in care for this patient include:  Patient Care Team:  Omega Wilkerson MD as PCP - General (Internal Medicine)  Omega Wilkerson MD as Assigned PCP  Heydi Wilkes MD as Assigned Heart and Vascular Provider  Heydi Wilkes MD as MD (Cardiovascular Disease)  Jhony Soto MD as MD (Neurological Surgery)  Jhony Soto MD as Assigned Neuroscience Provider    The following health maintenance items are reviewed in Epic  "and correct as of today:  Health Maintenance   Topic Date Due    AORTIC ANEURYSM SCREENING (SYSTEM ASSIGNED)  Never done    ANNUAL REVIEW OF HM ORDERS  07/14/2023    COVID-19 Vaccine (6 - 2023-24 season) 09/01/2023    MEDICARE ANNUAL WELLNESS VISIT  11/22/2023    LIPID  11/29/2023    COLORECTAL CANCER SCREENING  12/14/2024    FALL RISK ASSESSMENT  03/26/2025    GLUCOSE  11/29/2025    ADVANCE CARE PLANNING  11/22/2027    DTAP/TDAP/TD IMMUNIZATION (3 - Td or Tdap) 07/14/2032    HEPATITIS C SCREENING  Completed    PHQ-2 (once per calendar year)  Completed    INFLUENZA VACCINE  Completed    Pneumococcal Vaccine: 65+ Years  Completed    ZOSTER IMMUNIZATION  Completed    RSV VACCINE (Pregnancy & 60+)  Completed    IPV IMMUNIZATION  Aged Out    HPV IMMUNIZATION  Aged Out    MENINGITIS IMMUNIZATION  Aged Out    RSV MONOCLONAL ANTIBODY  Aged Out       Review of Systems  Constitutional, HEENT, cardiovascular, pulmonary, gi and gu systems are negative, except as otherwise noted.     Objective    Exam  /80 (BP Location: Right arm, Patient Position: Sitting)   Pulse 86   Temp 97.8  F (36.6  C)   Resp 16   Ht 1.74 m (5' 8.5\")   Wt 98.4 kg (217 lb)   SpO2 97%   BMI 32.51 kg/m     Estimated body mass index is 32.51 kg/m  as calculated from the following:    Height as of this encounter: 1.74 m (5' 8.5\").    Weight as of this encounter: 98.4 kg (217 lb).    Physical Exam    General: Alert, in no distress  Skin: Scattered benign-appearing nevi, (currently working with his dermatologist)  Eyes/nose/throat: Eyes without scleral icterus, eye movements normal, pupils equal and reactive, oropharynx clear, ears with normal TM's  MSK: Neck with good ROM  Lymphatic: Neck without adenopathy or masses  Endocrine: Thyroid with no nodules to palpation  Pulm: Lungs clear to auscultation bilaterally  Cardiac: Heart with regular rate and rhythm, 2/6 systolic murmur best heard right upper sternal border in the aortic area, probably " represents aortic sclerosis, doubt stenosis.  Carotid arteries are quiet    GI: Abdomen obese, soft, nontender. No palpable enlargement of liver or spleen  MSK: Extremities no tenderness or edema  Neuro: Moves all extremities, without focal weakness  Psych: Alert, normal mental status. Normal affect and speech             3/26/2024   Mini Cog   Clock Draw Score 2 Normal   3 Item Recall 3 objects recalled   Mini Cog Total Score 5         Vision Screen       ASSESSMENT AND PLAN    ADDENDUM:  Mildly elevated TSH 4.95, borderline low free T4.  Could have subclinical hypothyroidism.  He reported being a little fatigued and feeling stiff.  Will start him on small dose of levothyroxine 50 mcg daily, recheck TSH 3 months.  miCab message sent.    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  I believe this is on the basis of multisite osteoarthritis, as he is well-documented in his history, and also recent spine imaging showing a lot of degenerative disc changes.    I do want to check some systemic test to at least consider the possibility of polymyalgia rheumatica.  Therefore we will include a sedimentation rate and C-reactive protein along with his routine lab work which Kyle is going to have done on a future morning when he is fasting.  I will include a lipid panel, comprehensive metabolic panel, blood cell counts, TSH for thyroid, PSA for prostate.    Kyle's project for 2024 is to try to lose 20 pounds.    Osteoarthritis multi-site, stiffness probably on that basis  Although I believe lisset aching and stiffness is from multisite osteoarthritis, I will include in his lab work some testing for potential inflammatory disorders such as polymyalgia rheumatica.  Therefore lets include a sedimentation rate and C-reactive protein, and also a CK muscle enzyme measurement.    Extensive  degenerative spondylosis in the lumbar and thoracic spine, and levoscoliosis lumbar centered at L2-3  Chronic low back pain and scoliosis, does home exercises    Had lumbar RFA performed around October 26, 2022--he reported procedure was successful in terms of pain relief    The most important thing is for Kyle to focus on our weight control, core muscle strengthening, and flexibility    10-  Impression:   1. Thoracic spine: Multilevel thoracic spondylosis greatest at T11-12 where there is moderate spinal canal stenosis and cord compression and mild bilateral neural foraminal stenosis. No myelopathic cord signal.     2. Lumbar spine: Multilevel lumbar spondylosis greatest at L2-3 with moderate right neural foraminal stenosis. No high-grade spinal canal stenosis at any level. Levoscoliosis centered at L2-3.     Numbness in feet,  Left ankle surgery July 2022, likely peripheral nerve entrapment from mechanical overuse  Kyle traces the symptoms to after he had the left ankle fracture and surgery.  Initially the numbness was in his left foot, then he started getting in his right foot.  I am pretty sure the trouble is nerve entrapment, tarsal tunnel syndrome related to chronic stress and suboptimal gait mechanics in both feet and ankles.    The answer for this is several fold.  Kyle is correct that he should avoid going barefoot.  He should also always wear supportive footwear.  Losing weight will help.  Choosing his exercises to reduce weightbearing and especially impact on his feet would be favorable.  Thus, although walking is good exercise, that should not be the mainstay of his fitness program.  Instead he should rely on things like floor exercises, isometric strength training, and using nonfoot weightbearing equipment such as exercise bikes, rowing machines, etc.    Recovered from left distal tibial fracture July 12, 2022, which required open reduction internal fixation surgery with placement of 6 or 7  screws, which was able to be done despite his being only 4 months after his heart attack and coronary stent    Nice recovered from ST elevation inferior myocardial infarction that was followed by emergency coronary angioplasty and stenting of his right coronary artery, performed March 4, 2022 at [facility].  His presenting symptom was indigestion, which would have been quite characteristic for inferior MI.  Coronary angiography and revascularization was performed via a right radial artery approach     Coronary artery disease, most recent intervention for right coronary artery occlusion and stents placed March 4, 2022 after inferior ST elevation MI  Previous cardiac history of  frequent PVCs, prior myocardial infarction and coronary interventions in 1998, 2008, and then July 2016 with drug-eluting stents placed into the left anterior descending artery and mid circumflex     Admitted to [facility] on March 4th, 2022 inferior STEMI.   S/p 2 SMITHA dRCA occlusion; residual diffuse ISR or proximal and mRCA stents.  Continues on ASA 81mg every day  Lipitor 40mg QD   clopidogrel (previously Brilinta) and   Toprol 25 mg QD. Dual antiplatelet therapy minimum of one years time.    Systolic murmur best heard right upper sternal border in the aortic area, probably represents aortic sclerosis, doubt stenosis.  Carotid arteries are quiet     Swallowing difficulties for dry scratchy texture food such as pretzels and chips, and that may have been the cause of a vasovagal faint after which caused him to fall and triggered the tibial fracture     Hypertension in the context of coronary disease, blood pressures running higher over the last year  Lisinopril 20 mg once a day (reduced from twice a day)  Metoprolol succinate 25 mg once a day  Amlodipine 5 mg once a day  Hydrochlorothiazide 25 mg once a day     BP Readings from Last 6 Encounters:   03/26/24 128/80   10/13/23 112/79   09/18/23 138/82   04/12/23 122/82   12/01/22 130/62    11/22/22 132/88     Advanced osteoarthritis right knee, chronic torn meniscus right knee, which demonstrated on MRI in December 2014, not too symptomatic, but he should continue to work with [facility] on this issue, and also pursue quadriceps muscle strengthening  The 2014 knee MRI demonstrated an extensive complex tear of the posterior horn and body of the medial meniscus, also degenerative arthritis in the medial and patellofemoral compartments with grade IV chondromalacia, also low-grade sprain or inflammation of the medial collateral ligament.  Even though this was 6 years ago, his knee is holding up reasonably well.  He did stress it yesterday when he was moving a panel.  But today says his knee feels pretty good.  There is a mild to moderate sized effusion on physical exam.  I think he could continue with conservative management strategy, but should at [facility] about learning some home exercises to strengthen his quadriceps, which will help protect his knee, and delay the need for surgery.     Possible carpal tunnel syndrome right hand, mild  He told me he experiences a little bit of tingling in the hand.   I would suggest he try a carpal tunnel Velcro splint, and especially in bed.     History of bladder cancer April 2014, working with Dr. Pedro Guerrero at [facility], getting yearly cystoscopies, has also had intermittent tenderness right testicle.       Flatulence, which very well could be from indigestible vegetables sugars (alpha glycosides), for which I suggested that he try over-the-counter enzyme supplement, Beano or similar.  He noticed more gas after he switched to a predominantly vegetable diet.     Obesity with body mass index 32, likely contributing to blood pressure, would like to see him lose 20 pounds this year.    I like to see him get down around 180 pounds by the end of the year.  I reminded him of the importance of eating slowly, controlling portion size, and identifying  problem foods to curtail or eliminate, especially starches, sweets, fried foods.  He told me that alcohol consumption is approximately once a week, so that does not sound like a big source of calories for him.     Wt Readings from Last 5 Encounters:   03/26/24 98.4 kg (217 lb)   10/13/23 96.2 kg (212 lb)   09/18/23 96.6 kg (213 lb 1 oz)   04/12/23 96.2 kg (212 lb)   12/01/22 97.3 kg (214 lb 6.4 oz)     Hyperlipidemia in the context of coronary disease, on high dose atorvastatin   Lipid profile March 4, 2022 showed good control with LDL 60, HDL bit low at 34, triglycerides okay 123, total cholesterol 119     Droopy eyelids (dermatochalasis) for which he should consult with his ophthalmologist.      Erectile dysfunction, okay to increase sildenafil up to 100 mg dose     Gastroesophageal reflux, not too bothersome.      History of trochanteric bursitis both hips, in remission     Sensitivity of the right nipple, but I do not see any visible abnormality, nor do I palpate any abnormality. Kyle first noticed this symptom in approximately 2 months ago around February 2023.  The nipple has been very sensitive to touch, or when this skin gets jostled around, for example when he is walking or jogging.     On examination April 2023, the nipple appears normal to my eye, and when I palpate I appreciate normal glandular tissue, symmetrical with the left side.    Darkly pigmented papules on both sides of his neck, saw [facility]     Larger 10 mm colon polyp removed December 14, 2021, for which recheck was recommended in 3 years which would be December 2024  History of tubular adenoma colon polyp 6 mm sigmoid June 2016     Carpal tunnel syndrome and right thumb osteoarthritis, not too bothersome these days      Vaccines are up to date  Immunization History   Administered Date(s) Administered    COVID-19 Bivalent 12+ (Pfizer) 09/13/2022    COVID-19 MONOVALENT 12+ (Pfizer) 02/01/2021, 02/22/2021, 09/30/2021    COVID-19  Monovalent 12+ (Pfizer 2022) 04/20/2022    DT (PEDS <7y) 01/01/1995, 05/01/2005    Flu 65+ Years 10/17/2022    Flu, Unspecified 11/14/2007, 10/10/2008, 09/22/2010, 11/02/2011, 12/17/2012    Influenza (H1N1) 12/15/2009    Influenza (High Dose) 3 valent vaccine 10/30/2015, 11/23/2016, 10/18/2017, 09/26/2018, 11/05/2019    Influenza (IIV3) PF 11/25/2014    Influenza Vaccine 65+ (Fluzone HD) 10/22/2020, 10/16/2021, 10/10/2023    Influenza Vaccine, 6+MO IM (QUADRIVALENT W/PRESERVATIVES) 12/15/2009, 12/06/2013, 11/25/2014    Pneumo Conj 13-V (2010&after) 05/23/2016    Pneumococcal 23 valent 11/25/2014    RSV Vaccine (Arexvy) 10/13/2023    TDAP (Adacel,Boostrix) 04/19/2011, 07/14/2022    Td,adult,historic,unspecified 05/01/2005    Zoster recombinant adjuvanted (SHINGRIX) 12/28/2021, 05/15/2022    Zoster vaccine, live 12/06/2013, 12/28/2021       Signed Electronically by: JORDAN SOLOMON MD

## 2024-03-26 NOTE — PATIENT INSTRUCTIONS
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  I believe this is on the basis of multisite osteoarthritis, as he is well-documented in his history, and also recent spine imaging showing a lot of degenerative disc changes.    I do want to check some systemic test to at least consider the possibility of polymyalgia rheumatica.  Therefore we will include a sedimentation rate and C-reactive protein along with his routine lab work which Kyle is going to have done on a future morning when he is fasting.  I will include a lipid panel, comprehensive metabolic panel, blood cell counts, TSH for thyroid, PSA for prostate.    Kyle's project for 2024 is to try to lose 20 pounds.    Osteoarthritis multi-site, stiffness probably on that basis  Although I believe lisset aching and stiffness is from multisite osteoarthritis, I will include in his lab work some testing for potential inflammatory disorders such as polymyalgia rheumatica.  Therefore lets include a sedimentation rate and C-reactive protein, and also a CK muscle enzyme measurement.    Extensive degenerative spondylosis in the lumbar and thoracic spine, and levoscoliosis lumbar centered at L2-3  Chronic low back pain and scoliosis, does home exercises    Had lumbar RFA performed around October 26, 2022--he reported procedure was successful in terms of pain relief    The most important thing is for Kyle to focus on our weight control, core muscle strengthening, and flexibility    10-  Impression:   1. Thoracic spine: Multilevel thoracic spondylosis greatest at T11-12 where there is moderate spinal canal stenosis and cord compression and mild bilateral neural foraminal stenosis. No myelopathic cord signal.     2. Lumbar spine: Multilevel lumbar spondylosis greatest at L2-3 with moderate right neural foraminal stenosis. No  high-grade spinal canal stenosis at any level. Levoscoliosis centered at L2-3.     Numbness in feet,  Left ankle surgery July 2022, likely peripheral nerve entrapment from mechanical overuse  Kyle traces the symptoms to after he had the left ankle fracture and surgery.  Initially the numbness was in his left foot, then he started getting in his right foot.  I am pretty sure the trouble is nerve entrapment, tarsal tunnel syndrome related to chronic stress and suboptimal gait mechanics in both feet and ankles.    The answer for this is several fold.  Kyle is correct that he should avoid going barefoot.  He should also always wear supportive footwear.  Losing weight will help.  Choosing his exercises to reduce weightbearing and especially impact on his feet would be favorable.  Thus, although walking is good exercise, that should not be the mainstay of his fitness program.  Instead he should rely on things like floor exercises, isometric strength training, and using nonfoot weightbearing equipment such as exercise bikes, rowing machines, etc.    Recovered from left distal tibial fracture July 12, 2022, which required open reduction internal fixation surgery with placement of 6 or 7 screws, which was able to be done despite his being only 4 months after his heart attack and coronary stent    Nice recovered from ST elevation inferior myocardial infarction that was followed by emergency coronary angioplasty and stenting of his right coronary artery, performed March 4, 2022 at [facility].  His presenting symptom was indigestion, which would have been quite characteristic for inferior MI.  Coronary angiography and revascularization was performed via a right radial artery approach     Coronary artery disease, most recent intervention for right coronary artery occlusion and stents placed March 4, 2022 after inferior ST elevation MI  Previous cardiac history of  frequent PVCs, prior myocardial infarction and  coronary interventions in 1998, 2008, and then July 2016 with drug-eluting stents placed into the left anterior descending artery and mid circumflex     Admitted to [place] on March 4th, 2022 inferior STEMI.   S/p 2 SMITHA dRCA occlusion; residual diffuse ISR or proximal and mRCA stents.  Continues on ASA 81mg every day  Lipitor 40mg QD   clopidogrel (previously Brilinta) and   Toprol 25 mg QD. Dual antiplatelet therapy minimum of one years time.    Systolic murmur best heard right upper sternal border in the aortic area, probably represents aortic sclerosis, doubt stenosis.  Carotid arteries are quiet     Swallowing difficulties for dry scratchy texture food such as pretzels and chips, and that may have been the cause of a vasovagal faint after which caused him to fall and triggered the tibial fracture     Hypertension in the context of coronary disease, blood pressures running higher over the last year  Lisinopril 20 mg once a day (reduced from twice a day)  Metoprolol succinate 25 mg once a day  Amlodipine 5 mg once a day  Hydrochlorothiazide 25 mg once a day     BP Readings from Last 6 Encounters:   03/26/24 128/80   10/13/23 112/79   09/18/23 138/82   04/12/23 122/82   12/01/22 130/62   11/22/22 132/88     Advanced osteoarthritis right knee, chronic torn meniscus right knee, which demonstrated on MRI in December 2014, not too symptomatic, but he should continue to work with [facility] on this issue, and also pursue quadriceps muscle strengthening  The 2014 knee MRI demonstrated an extensive complex tear of the posterior horn and body of the medial meniscus, also degenerative arthritis in the medial and patellofemoral compartments with grade IV chondromalacia, also low-grade sprain or inflammation of the medial collateral ligament.  Even though this was 6 years ago, his knee is holding up reasonably well.  He did stress it yesterday when he was moving a panel.  But today says his knee feels pretty good.  There is  a mild to moderate sized effusion on physical exam.  I think he could continue with conservative management strategy, but should at [facility] about learning some home exercises to strengthen his quadriceps, which will help protect his knee, and delay the need for surgery.     Possible carpal tunnel syndrome right hand, mild  He told me he experiences a little bit of tingling in the hand.   I would suggest he try a carpal tunnel Velcro splint, and especially in bed.     History of bladder cancer April 2014, working with Dr. Pedro Guerrero at [facility], getting yearly cystoscopies, has also had intermittent tenderness right testicle.       Flatulence, which very well could be from indigestible vegetables sugars (alpha glycosides), for which I suggested that he try over-the-counter enzyme supplement, Beano or similar.  He noticed more gas after he switched to a predominantly vegetable diet.     Obesity with body mass index 32, likely contributing to blood pressure, would like to see him lose 20 pounds this year.    I like to see him get down around 180 pounds by the end of the year.  I reminded him of the importance of eating slowly, controlling portion size, and identifying problem foods to curtail or eliminate, especially starches, sweets, fried foods.  He told me that alcohol consumption is approximately once a week, so that does not sound like a big source of calories for him.     Wt Readings from Last 5 Encounters:   03/26/24 98.4 kg (217 lb)   10/13/23 96.2 kg (212 lb)   09/18/23 96.6 kg (213 lb 1 oz)   04/12/23 96.2 kg (212 lb)   12/01/22 97.3 kg (214 lb 6.4 oz)     Hyperlipidemia in the context of coronary disease, on high dose atorvastatin   Lipid profile March 4, 2022 showed good control with LDL 60, HDL bit low at 34, triglycerides okay 123, total cholesterol 119     Droopy eyelids (dermatochalasis) for which he should consult with his ophthalmologist.      Erectile dysfunction, okay to increase  sildenafil up to 100 mg dose     Gastroesophageal reflux, not too bothersome.      History of trochanteric bursitis both hips, in remission     Sensitivity of the right nipple, but I do not see any visible abnormality, nor do I palpate any abnormality. Kyle first noticed this symptom in approximately 2 months ago around February 2023.  The nipple has been very sensitive to touch, or when this skin gets jostled around, for example when he is walking or jogging.     On examination April 2023, the nipple appears normal to my eye, and when I palpate I appreciate normal glandular tissue, symmetrical with the left side.    Darkly pigmented papules on both sides of his neck, saw [facility]     Larger 10 mm colon polyp removed December 14, 2021, for which recheck was recommended in 3 years which would be December 2024  History of tubular adenoma colon polyp 6 mm sigmoid June 2016     Carpal tunnel syndrome and right thumb osteoarthritis, not too bothersome these days      Vaccines are up to date  Immunization History   Administered Date(s) Administered    COVID-19 Bivalent 12+ (Pfizer) 09/13/2022    COVID-19 MONOVALENT 12+ (Pfizer) 02/01/2021, 02/22/2021, 09/30/2021    COVID-19 Monovalent 12+ (Pfizer 2022) 04/20/2022    DT (PEDS <7y) 01/01/1995, 05/01/2005    Flu 65+ Years 10/17/2022    Flu, Unspecified 11/14/2007, 10/10/2008, 09/22/2010, 11/02/2011, 12/17/2012    Influenza (H1N1) 12/15/2009    Influenza (High Dose) 3 valent vaccine 10/30/2015, 11/23/2016, 10/18/2017, 09/26/2018, 11/05/2019    Influenza (IIV3) PF 11/25/2014    Influenza Vaccine 65+ (Fluzone HD) 10/22/2020, 10/16/2021, 10/10/2023    Influenza Vaccine, 6+MO IM (QUADRIVALENT W/PRESERVATIVES) 12/15/2009, 12/06/2013, 11/25/2014    Pneumo Conj 13-V (2010&after) 05/23/2016    Pneumococcal 23 valent 11/25/2014    RSV Vaccine (Arexvy) 10/13/2023    TDAP (Adacel,Boostrix) 04/19/2011, 07/14/2022    Td,adult,historic,unspecified 05/01/2005    Zoster  recombinant adjuvanted (SHINGRIX) 12/28/2021, 05/15/2022    Zoster vaccine, live 12/06/2013, 12/28/2021

## 2024-04-05 ENCOUNTER — LAB (OUTPATIENT)
Dept: LAB | Facility: CLINIC | Age: 74
End: 2024-04-05
Payer: COMMERCIAL

## 2024-04-05 DIAGNOSIS — E66.09 CLASS 1 OBESITY DUE TO EXCESS CALORIES WITHOUT SERIOUS COMORBIDITY WITH BODY MASS INDEX (BMI) OF 31.0 TO 31.9 IN ADULT: ICD-10-CM

## 2024-04-05 DIAGNOSIS — Z00.00 ROUTINE GENERAL MEDICAL EXAMINATION AT A HEALTH CARE FACILITY: ICD-10-CM

## 2024-04-05 DIAGNOSIS — E66.811 CLASS 1 OBESITY DUE TO EXCESS CALORIES WITHOUT SERIOUS COMORBIDITY WITH BODY MASS INDEX (BMI) OF 31.0 TO 31.9 IN ADULT: ICD-10-CM

## 2024-04-05 DIAGNOSIS — Z12.5 SCREENING PSA (PROSTATE SPECIFIC ANTIGEN): ICD-10-CM

## 2024-04-05 DIAGNOSIS — M25.60 GENERALIZED STIFFNESS: ICD-10-CM

## 2024-04-05 DIAGNOSIS — Z86.39 PERSONAL HISTORY OF OTHER ENDOCRINE, NUTRITIONAL AND METABOLIC DISEASE: ICD-10-CM

## 2024-04-05 DIAGNOSIS — E78.2 MIXED DYSLIPIDEMIA: ICD-10-CM

## 2024-04-05 DIAGNOSIS — Z13.1 SCREENING FOR DIABETES MELLITUS: ICD-10-CM

## 2024-04-05 LAB
ALBUMIN SERPL BCG-MCNC: 4.6 G/DL (ref 3.5–5.2)
ALP SERPL-CCNC: 72 U/L (ref 40–150)
ALT SERPL W P-5'-P-CCNC: 26 U/L (ref 0–70)
ANION GAP SERPL CALCULATED.3IONS-SCNC: 10 MMOL/L (ref 7–15)
AST SERPL W P-5'-P-CCNC: 25 U/L (ref 0–45)
BILIRUB SERPL-MCNC: 0.5 MG/DL
BUN SERPL-MCNC: 14 MG/DL (ref 8–23)
CALCIUM SERPL-MCNC: 9.5 MG/DL (ref 8.8–10.2)
CHLORIDE SERPL-SCNC: 99 MMOL/L (ref 98–107)
CHOLEST SERPL-MCNC: 143 MG/DL
CREAT SERPL-MCNC: 0.78 MG/DL (ref 0.67–1.17)
CRP SERPL-MCNC: <3 MG/L
DEPRECATED HCO3 PLAS-SCNC: 27 MMOL/L (ref 22–29)
EGFRCR SERPLBLD CKD-EPI 2021: >90 ML/MIN/1.73M2
ERYTHROCYTE [DISTWIDTH] IN BLOOD BY AUTOMATED COUNT: 12 % (ref 10–15)
ERYTHROCYTE [SEDIMENTATION RATE] IN BLOOD BY WESTERGREN METHOD: 7 MM/HR (ref 0–20)
FASTING STATUS PATIENT QL REPORTED: YES
GLUCOSE SERPL-MCNC: 88 MG/DL (ref 70–99)
HBA1C MFR BLD: 5.4 % (ref 0–5.6)
HCT VFR BLD AUTO: 41.9 % (ref 40–53)
HDLC SERPL-MCNC: 48 MG/DL
HGB BLD-MCNC: 14.6 G/DL (ref 13.3–17.7)
LDLC SERPL CALC-MCNC: 72 MG/DL
MCH RBC QN AUTO: 32.4 PG (ref 26.5–33)
MCHC RBC AUTO-ENTMCNC: 34.8 G/DL (ref 31.5–36.5)
MCV RBC AUTO: 93 FL (ref 78–100)
NONHDLC SERPL-MCNC: 95 MG/DL
PLATELET # BLD AUTO: 215 10E3/UL (ref 150–450)
POTASSIUM SERPL-SCNC: 4.1 MMOL/L (ref 3.4–5.3)
PROT SERPL-MCNC: 7.1 G/DL (ref 6.4–8.3)
PSA SERPL DL<=0.01 NG/ML-MCNC: 0.34 NG/ML (ref 0–6.5)
RBC # BLD AUTO: 4.5 10E6/UL (ref 4.4–5.9)
SODIUM SERPL-SCNC: 136 MMOL/L (ref 135–145)
T4 FREE SERPL-MCNC: 1.03 NG/DL (ref 0.9–1.7)
TRIGL SERPL-MCNC: 117 MG/DL
TSH SERPL DL<=0.005 MIU/L-ACNC: 4.95 UIU/ML (ref 0.3–4.2)
WBC # BLD AUTO: 8.1 10E3/UL (ref 4–11)

## 2024-04-05 PROCEDURE — 36415 COLL VENOUS BLD VENIPUNCTURE: CPT

## 2024-04-05 PROCEDURE — 84443 ASSAY THYROID STIM HORMONE: CPT

## 2024-04-05 PROCEDURE — 84439 ASSAY OF FREE THYROXINE: CPT

## 2024-04-05 PROCEDURE — 86140 C-REACTIVE PROTEIN: CPT

## 2024-04-05 PROCEDURE — 85027 COMPLETE CBC AUTOMATED: CPT

## 2024-04-05 PROCEDURE — 85652 RBC SED RATE AUTOMATED: CPT

## 2024-04-05 PROCEDURE — 83036 HEMOGLOBIN GLYCOSYLATED A1C: CPT

## 2024-04-05 PROCEDURE — G0103 PSA SCREENING: HCPCS

## 2024-04-05 PROCEDURE — 80061 LIPID PANEL: CPT

## 2024-04-05 PROCEDURE — 80053 COMPREHEN METABOLIC PANEL: CPT

## 2024-04-06 RX ORDER — LEVOTHYROXINE SODIUM 50 UG/1
50 TABLET ORAL DAILY
Qty: 90 TABLET | Refills: 1 | Status: SHIPPED | OUTPATIENT
Start: 2024-04-06

## 2024-04-15 ENCOUNTER — OFFICE VISIT (OUTPATIENT)
Dept: FAMILY MEDICINE | Facility: CLINIC | Age: 74
End: 2024-04-15
Payer: COMMERCIAL

## 2024-04-15 ENCOUNTER — ANCILLARY PROCEDURE (OUTPATIENT)
Dept: GENERAL RADIOLOGY | Facility: CLINIC | Age: 74
End: 2024-04-15
Attending: PHYSICIAN ASSISTANT
Payer: COMMERCIAL

## 2024-04-15 ENCOUNTER — MYC MEDICAL ADVICE (OUTPATIENT)
Dept: INTERNAL MEDICINE | Facility: CLINIC | Age: 74
End: 2024-04-15
Payer: COMMERCIAL

## 2024-04-15 VITALS
SYSTOLIC BLOOD PRESSURE: 163 MMHG | RESPIRATION RATE: 18 BRPM | TEMPERATURE: 97.9 F | DIASTOLIC BLOOD PRESSURE: 78 MMHG | OXYGEN SATURATION: 92 % | WEIGHT: 217 LBS | HEART RATE: 76 BPM | BODY MASS INDEX: 32.51 KG/M2

## 2024-04-15 DIAGNOSIS — R05.1 ACUTE COUGH: ICD-10-CM

## 2024-04-15 DIAGNOSIS — R05.1 ACUTE COUGH: Primary | ICD-10-CM

## 2024-04-15 DIAGNOSIS — R06.2 WHEEZING: ICD-10-CM

## 2024-04-15 PROCEDURE — 71046 X-RAY EXAM CHEST 2 VIEWS: CPT | Mod: TC | Performed by: RADIOLOGY

## 2024-04-15 PROCEDURE — 99214 OFFICE O/P EST MOD 30 MIN: CPT | Performed by: PHYSICIAN ASSISTANT

## 2024-04-15 RX ORDER — ALBUTEROL SULFATE 90 UG/1
2 AEROSOL, METERED RESPIRATORY (INHALATION) EVERY 6 HOURS
Qty: 18 G | Refills: 0 | Status: SHIPPED | OUTPATIENT
Start: 2024-04-15

## 2024-04-15 RX ORDER — DOXYCYCLINE 100 MG/1
100 CAPSULE ORAL 2 TIMES DAILY
Qty: 20 CAPSULE | Refills: 0 | Status: SHIPPED | OUTPATIENT
Start: 2024-04-15 | End: 2024-04-25

## 2024-04-15 RX ORDER — PREDNISONE 20 MG/1
40 TABLET ORAL DAILY
Qty: 10 TABLET | Refills: 0 | Status: SHIPPED | OUTPATIENT
Start: 2024-04-15 | End: 2024-04-20

## 2024-04-15 NOTE — PATIENT INSTRUCTIONS
1.  Take prednisone 2 pills in the morning with food.  Do this for 5 days.  Avoid NSAIDs like ibuprofen while you are taking this medication.  Tylenol is fine today.  2.  Use albuterol as needed for wheezing relief.  You can use this up to every 4-6 hours.  3.  Take doxycycline twice per day with food.  Hold your multivitamin while you are taking this medication.  Be cautious with sun exposure while on doxycycline, as it can make it easier to get sunburn.  4.  Chest x-ray did show some inflammatory changes, but chest x-ray was otherwise negative for signs of pneumonia.

## 2024-04-15 NOTE — PROGRESS NOTES
Patient presents with:  Cough: X5-6 days    Headache  chest congestion: X5-6 difficulty sleeping    Wheezing      Clinical Decision Making:  Differential diagnosis includes but is not limited to pneumonia, viral URI, COPD.  Patient is very wheezy on exam.  Chest x-ray clear of any signs of pneumonia, but consistent with inflammatory changes.  Patient started on prednisone and albuterol for wheezing control.  Moderate suspicion for undiagnosed COPD, for this reason I recommend starting doxycycline for now.  He will follow-up with primary care for further evaluation if symptoms persist.      ICD-10-CM    1. Acute cough  R05.1 XR Chest 2 Views      2. Wheezing  R06.2 albuterol (PROAIR HFA/PROVENTIL HFA/VENTOLIN HFA) 108 (90 Base) MCG/ACT inhaler     predniSONE (DELTASONE) 20 MG tablet     doxycycline hyclate (VIBRAMYCIN) 100 MG capsule          Patient Instructions   1.  Take prednisone 2 pills in the morning with food.  Do this for 5 days.  Avoid NSAIDs like ibuprofen while you are taking this medication.  Tylenol is fine today.  2.  Use albuterol as needed for wheezing relief.  You can use this up to every 4-6 hours.  3.  Take doxycycline twice per day with food.  Hold your multivitamin while you are taking this medication.  Be cautious with sun exposure while on doxycycline, as it can make it easier to get sunburn.  4.  Chest x-ray did show some inflammatory changes, but chest x-ray was otherwise negative for signs of pneumonia.    HPI:  Josafat Madera is a 73 year old male who presents today with concerns of cough, chest congestion, and wheezing for the past 5 to 6 days.  Sound of the wheezing is keeping the patient up at night.  Patient denies any known past medical history of chronic lung disease.  He did have a 20-pack-year history, but quit smoking cigarettes about 20 years ago.  He does still occasionally smoke cigars when he golfs.  He denies any fevers or chest pain.    History obtained from the  patient.    Problem List:  2019: Trochanteric bursitis of both hips  2018-: Osteoarthritis of right knee  2016-: Tubular adenoma of colon  Male Erectile Disorder  Scoliosis  Primary Osteoarthritis Of The Lumbar Vertebrae  Personal history of malignant neoplasm of bladder  Essential Hypercholesterolemia  Benign Essential Hypertension  Coronary Artery Disease  STEMI (ST elevation myocardial infarction) (H)  Carpal Tunnel Syndrome  Chronic Reflux Esophagitis  Joint Pain, Localized In The Knee  Bladder cancer (H)      Past Medical History:   Diagnosis Date    Bladder cancer (H)     see [facility] notes    Carpal tunnel syndrome     Cataract     Coronary artery disease     GERD (gastroesophageal reflux disease)     Hypercholesteremia     Hypertension     Lower back pain     Scoliosis        Social History     Tobacco Use    Smoking status: Former     Types: Cigars     Quit date:      Years since quittin.3     Passive exposure: Never    Smokeless tobacco: Never    Tobacco comments:     occasional cigar   Substance Use Topics    Alcohol use: Yes     Comment: 2 drinks per month       Review of Systems    Vitals:    04/15/24 1612   BP: (!) 163/78   BP Location: Right arm   Patient Position: Sitting   Cuff Size: Adult Regular   Pulse: 76   Resp: 18   Temp: 97.9  F (36.6  C)   TempSrc: Oral   SpO2: 92%   Weight: 98.4 kg (217 lb)       Physical Exam  Vitals and nursing note reviewed.   Constitutional:       General: He is not in acute distress.     Appearance: He is not toxic-appearing or diaphoretic.   HENT:      Head: Normocephalic and atraumatic.      Right Ear: External ear normal.      Left Ear: External ear normal.   Eyes:      Conjunctiva/sclera: Conjunctivae normal.   Cardiovascular:      Rate and Rhythm: Normal rate and regular rhythm.      Heart sounds: No murmur heard.  Pulmonary:      Effort: Pulmonary effort is normal. No respiratory distress.      Breath sounds: No stridor. Wheezing present. No  rhonchi or rales.   Neurological:      Mental Status: He is alert.   Psychiatric:         Mood and Affect: Mood normal.         Behavior: Behavior normal.         Thought Content: Thought content normal.         Judgment: Judgment normal.          Results:  I have personally ordered and preliminarily reviewed the following xray, I have noted no new opacities  Results for orders placed or performed in visit on 04/15/24   XR Chest 2 Views     Status: None    Narrative    EXAM: XR CHEST 2 VIEWS  LOCATION: [facility]  DATE: 4/15/2024    INDICATION: Cough x5 days. Expiratory wheezes.  COMPARISON: 01/15/2020      Impression    IMPRESSION: Heart size and vascularity are normal. Mild bronchial wall thickening compatible with airway inflammation. No focal consolidation, pneumothorax nor pleural effusion.         At the end of the encounter, I discussed results, diagnosis, medications. Discussed red flags for immediate return to clinic/ER, as well as indications for follow up if no improvement. Patient understood and agreed to plan. Patient was stable for discharge.

## 2024-04-26 ENCOUNTER — TRANSFERRED RECORDS (OUTPATIENT)
Dept: HEALTH INFORMATION MANAGEMENT | Facility: CLINIC | Age: 74
End: 2024-04-26
Payer: COMMERCIAL

## 2024-05-09 ENCOUNTER — OFFICE VISIT (OUTPATIENT)
Dept: CARDIOLOGY | Facility: CLINIC | Age: 74
End: 2024-05-09
Payer: COMMERCIAL

## 2024-05-09 VITALS
RESPIRATION RATE: 16 BRPM | BODY MASS INDEX: 31.62 KG/M2 | HEART RATE: 80 BPM | OXYGEN SATURATION: 95 % | SYSTOLIC BLOOD PRESSURE: 125 MMHG | DIASTOLIC BLOOD PRESSURE: 74 MMHG | WEIGHT: 211 LBS

## 2024-05-09 DIAGNOSIS — I25.10 CORONARY ARTERY DISEASE INVOLVING NATIVE CORONARY ARTERY OF NATIVE HEART WITHOUT ANGINA PECTORIS: Primary | ICD-10-CM

## 2024-05-09 DIAGNOSIS — E78.5 DYSLIPIDEMIA: ICD-10-CM

## 2024-05-09 DIAGNOSIS — I49.3 PVC'S (PREMATURE VENTRICULAR CONTRACTIONS): ICD-10-CM

## 2024-05-09 DIAGNOSIS — I10 HYPERTENSION, UNSPECIFIED TYPE: ICD-10-CM

## 2024-05-09 PROCEDURE — 99214 OFFICE O/P EST MOD 30 MIN: CPT | Performed by: INTERNAL MEDICINE

## 2024-05-09 NOTE — LETTER
"5/9/2024    JORDAN SOLOMON MD  [address] [place] [address]    RE: Josafat ARVIZU Kimaniiwona       Dear Colleague,     I had the pleasure of seeing Josafat Madera in the [facility].         [facility] [address], [place] [address]   [email or web address]   OFFICE: [phone]            Impression and Plan     1.  Coronary artery disease.  \"Kyle\" has known coronary disease having had prior myocardial infarction and percutaneous interventions in 1998 and 2008. On 12 August 2008 after the patient had presented with acute occlusion of the right coronary artery. He had 2 stents placed to the vessel.     \"Kyle\" underwent repeat angiography 14 July 2016 at which time he had PCI with stenting of mid left anterior descending coronary artery ([place] [employer] Synergy SMITHA [id number]) and PCI with stenting of mid circumflex coronary artery ([place] [employer] Synergy SMITHA 3.0x20mm).  See Cardiac Diagnostics section below for details.     \"Kyle\" underwent an exercise stress test 21 December 2018 that was favorable and revealed no evidence of ischemia and normal functional capacity.     Kyle underwent repeat angiography 4 March 2022 after presenting to [facility] with evidence of an unstable ischemic syndrome.  At that time he had successful PCI with stent deployment to distal right coronary artery (3.5 x 18 mm [place] prior to crux, and a second 4.0 x 16 mm Synergy SMITHA in mid-distal RCA, overlapping proximally with the old mid RCA stent).     Regadenoson MRI 25 October 2022 revealed no evidence of ischemia.     This is stable.  He denies any chest pain or other symptoms concerning for angina.     2.  PVCs.     Continue metoprolol.     3.  Hypertension.  Blood pressure is reasonable in the office today at 125/74 mmHg.     4.  Dyslipidemia.  Lipid profile 5 April 2024 revealed LDL 72 mg/dL and HDL 48 mg/dL.  Continue high intensity statin therapy.     Plan on " "follow-up in approximately one year.     35 minutes spent reviewing prior records (including documentation, laboratory studies, cardiac testing/imaging), interview with patient along with physical exam, planning, and subsequent documentation/crafting of note).           History of Present Illness    Once again I would like to thank you again for asking me to participate in the care of your patient, Josafat Madera.  As you know, but to reiterate for my own records, Josafat Madera is a 73 year old male with known coronary artery disease. Specifically, \"Lola" has known coronary disease having had prior myocardial infarction and percutaneous interventions in 1998 and 2008. On 12 August 2008 after \"Lola" had presented with acute occlusion of the right coronary artery. He had 2 stents placed to the vessel.     \"Lola" underwent repeat angiography 14 July 2016 at which time he had PCI with stenting of mid left anterior descending coronary artery ([place] [employer] Synergy SMITHA [id number]) and PCI with stenting of mid circumflex coronary artery ([place] [employer] Synergy SMITHA 3.0x20mm).  See Cardiac Diagnostics section below for details.     Kyle underwent an exercise stress test 21 December 2018 that was favorable and revealed no evidence of ischemia and normal functional capacity.     Kyle underwent repeat angiography 4 March 2022 after presenting to [facility] with evidence of a unstable ischemic syndrome.  At that time he had successful PCI with stent deployment to distal right coronary artery (3.5 x 18 mm [place] prior to crux, and a second 4.0 x 16 mm Synergy SMITHA in mid-distal RCA, overlapping proximally with the old mid RCA stent).     On interview today, \"Lola" states that he has been doing well from a cardiac standpoint.  He specifically denies any chest pain, shortness of breath, or subjective decline in exercise tolerance.  He denies palpitations or lightheadedness.    Further review of systems is otherwise " negative/noncontributory (medical record and 13 point review of systems reviewed as well and pertinent positives noted).         Cardiac Diagnostics      Regadenoson MRI 25 October 2022:  Pharmacological Regadenoson stress cardiac MRI is negative for inducible myocardial ischemia.   Pharmacological stress ECG is negative for inducible myocardial ischemia.   Normal left ventricular size, wall thickness with normal ejection fraction. Moderate hypokinesis of the basal inferolateral wall segment. Left ventricular ejection fraction is 60-65% by visual estimation of real time images.   There is small area of transmural infarction involving the basal to mid inferolateral wall.   Non-transmural infarction of the basal inferoseptal and inferior wall segments of the left ventricle representing viable myocardium.      Normal right ventricular size and systolic function.    No evidence of significant valvular abnormalities.    Echocardiogram 5 March 2022 (performed at [facility]):  Normal left ventricular size and systolic performance with ejection fraction of 65%.  Mid basal to mid inferior hypokinesis.  Remaining segments appear hyperdynamic.  Mild septal thickening.  No significant valvular heart disease.  Normal right ventricular size and systolic performance.  Atrial level visualized.    Echocardiogram for December 2020 (personally reviewed):  Normal left ventricular size and systolic performance with a visually estimated ejection fraction of 60-65%.   No significant valvular heart disease is identified on this study.   Normal right ventricular size and systolic performance.   Normal atrial dimensions.    Exercise stress test 13 October 2020 (formally reviewed by Dr. Maxx Russell ([place]) and also personally reviewed):  No ECG evidence of ischemia.  No chest pain symptoms with exercise.  Exercise-induced PVCs and short run of nonsustained VT was noted (5 beat).    Exercise stress test 19 December 2018:  No  ECG evidence of ischemia.  No chest pain symptoms reported with exercise.  Normal functional capacity.    Nuclear stress perfusion study 1 July 2016 (pre-coronary angiogram which was performed 14 July 2016):  There is a large, severe perfusion defect involving the entire lateral wall on the left ventricle that redistributes on the rest images consistent with ischemia.  Normal left ventricular systolic performance with ejection fraction of 54% with lateral hypokinesis.    Nuclear stress perfusion study 28 October 2008:  Medium-sized area of non-transmural infarction in the inferior wall.  Normal left ventricular systolic performance with ejection fraction of 66% with very small area of basal inferior hypokinesis.    Coronary angiogram 4 March 2022 (performed at [facility]).  Left main coronary artery: No significant stenosis.  Left anterior descending coronary artery: Mid 30-40% stenosis.  Mid 50-60% stenosis.  Circumflex coronary artery: Large vessel with mild proximal disease.  Right coronary artery: Mild to moderate in-stent restenosis of proximal-mid RCA stent.  50% in-stent restenosis involving proximal right coronary artery stent.  Acute total occlusion of distal right coronary artery just beyond edge of the mid right coronary artery stent.  90% stenosis in the distal right coronary artery prior to the crux.  Successful PCI with stent deployment to distal right coronary artery (3.5 x 18 mm [place] prior to crux, and a second 4.0 x 16 mm Synergy SMITHA in mid-distal RCA, overlapping proximally with the old mid RCA stent).    Coronary angiography 14 July 2016:  Left anterior descending coronary artery: Mid 75% stenosis.  Left circumflex coronary artery: Mid 90% stenosis with more severe distal stenosis.  Right coronary artery: 25% proximal stenosis and 40% distal stenosis.  Status post PCI with stenting of mid left anterior descending coronary artery ([place] [employer] Synergy SMITHA [id number]).  Status post PCI with  stenting of mid circumflex coronary artery ([place] [employer] Synergy SMITHA 3.0x20mm).  Recommendations:  Consider medical therapy with possible PCI of distal LCX if symptoms not controlled. However small in caliber and likely too small for stent  Continue with prasugrel (Effient  ) for 12 months, but if necessary could discontinue earlier at 3 months. If tolerated would advocate 12 month course.    CT coronary angiography 22 May 2014:  Left main without significant stenosis.  Left anterior descending has a calcified plaque from distal left main and other calcified plaques and soft plaques in the mid left anterior descending which cause moderate stenosis of 50-60%. It appears to be non-obstructive in the left anterior descending.  Mild to moderate disease in the left circumflex.  There is mild disease in the proximal right coronary artery. The stent in the right coronary artery is patent. The distal right coronary artery stent is not able to be evaluated because of significant artifact.    Coronary angiography 12 August 2008 (performed at [facility]) :  Left main coronary artery: Mild irregularities.  Left anterior descending coronary artery: 70-80% stenosis at the branch point of the first major diagonal.  Circumflex artery with small obtuse marginal branch occlusion.  Right coronary artery: Vessel occluded in the proximal third portion. Culprit vessel for myocardial infarction.  PCI with stent placement (3.5×12 mm drug-eluting stent) to right coronary artery.    Holter monitor 22 October 2020:  Frequent PVCs.  Most of the PVCs are from a single site, but somecomplexity is noted including frequent couplets and some triplets.              Physical Examination       /74 (BP Location: Left arm, Patient Position: Sitting, Cuff Size: Adult Large)   Pulse 80   Resp 16   Wt 95.7 kg (211 lb)   SpO2 95%   BMI 31.62 kg/m          Wt Readings from Last 3 Encounters:   05/09/24 95.7 kg (211  lb)   04/15/24 98.4 kg (217 lb)   03/26/24 98.4 kg (217 lb)       The patient is alert and oriented times three. Sclerae are anicteric. Mucosal membranes are moist. Jugular venous pressure is normal. No significant adenopathy/thyromegally appreciated. Lungs are clear with good expansion. On cardiovascular exam, the patient has a regular S1 and S2. Abdomen is soft and non-tender. Extremities reveal no clubbing, cyanosis, or edema.       Family History/Social History/Risk Factors   Patient does not smoke.  Family history reviewed, and family history includes Cerebrovascular Disease in his mother; Crohn's Disease in his sister; Diabetes in his brother; Heart Disease in his father and mother.          Medical History  Surgical History Family History Social History   Past Medical History:   Diagnosis Date    Bladder cancer (H)     see [facility] notes    Carpal tunnel syndrome     Cataract     Coronary artery disease     GERD (gastroesophageal reflux disease)     Hypercholesteremia     Hypertension     Lower back pain     Scoliosis      Past Surgical History:   Procedure Laterality Date    ANGIOPLASTY  1998/2008    BLADDER SURGERY  03/01/2014    Polyp removal    HC REMOVAL OF TONSILS,<13 Y/O      Description: Tonsillectomy;  Recorded: 06/10/2008;    HERNIA REPAIR  01/01/2009    Bilateral    PHACOEMULSIFICATION CLEAR CORNEA WITH STANDARD INTRAOCULAR LENS IMPLANT Right 07/10/2014    Procedure: Right Cataract Extraction with Intraocular Lens Implantation;  Surgeon: Karl Nava MD;  Location: [facility];  Service:     PHACOEMULSIFICATION CLEAR CORNEA WITH STANDARD INTRAOCULAR LENS IMPLANT Left 08/14/2014    Procedure: CATARACT EXTRACTION WITH INTRAOCULAR LENS IMPLANTATION LEFT EYE;  Surgeon: Karl Nava MD;  Location: [facility];  Service:     right finger surgery Right     Extensor Tendon Repair    TONSILLECTOMY       Family History   Problem Relation Age of Onset    Cerebrovascular Disease Mother      Heart Disease Mother     Heart Disease Father     Crohn's Disease Sister     Diabetes Brother         Social History     Socioeconomic History    Marital status:      Spouse name: Not on file    Number of children: Not on file    Years of education: Not on file    Highest education level: Not on file   Occupational History    Not on file   Tobacco Use    Smoking status: Former     Types: Cigars     Quit date:      Years since quittin.3     Passive exposure: Never    Smokeless tobacco: Never    Tobacco comments:     occasional cigar   Vaping Use    Vaping status: Never Used   Substance and Sexual Activity    Alcohol use: Yes     Comment: 2 drinks per month    Drug use: No    Sexual activity: Yes     Partners: Female   Other Topics Concern    Not on file   Social History Narrative    Not on file     Social Determinants of Health     Financial Resource Strain: Low Risk  (3/19/2024)    Financial Resource Strain     Within the past 12 months, have you or your family members you live with been unable to get utilities (heat, electricity) when it was really needed?: No   Food Insecurity: Low Risk  (3/19/2024)    Food Insecurity     Within the past 12 months, did you worry that your food would run out before you got money to buy more?: No     Within the past 12 months, did the food you bought just not last and you didn t have money to get more?: No   Transportation Needs: Low Risk  (3/19/2024)    Transportation Needs     Within the past 12 months, has lack of transportation kept you from medical appointments, getting your medicines, non-medical meetings or appointments, work, or from getting things that you need?: No   Physical Activity: Insufficiently Active (3/19/2024)    Exercise Vital Sign     Days of Exercise per Week: 3 days     Minutes of Exercise per Session: 40 min   Stress: No Stress Concern Present (3/19/2024)    Citizen of the [place] of Occupational Health - Occupational Stress Questionnaire      Feeling of Stress : Only a little   Social Connections: Unknown (3/19/2024)    Social Connection and Isolation Panel [NHANES]     Frequency of Communication with Friends and Family: Not on file     Frequency of Social Gatherings with Friends and Family: Three times a week     Attends Taoism Services: Not on file     Active Member of Clubs or Organizations: Not on file     Attends Club or Organization Meetings: Not on file     Marital Status: Not on file   Interpersonal Safety: Low Risk  (3/26/2024)    Interpersonal Safety     Do you feel physically and emotionally safe where you currently live?: Yes     Within the past 12 months, have you been hit, slapped, kicked or otherwise physically hurt by someone?: No     Within the past 12 months, have you been humiliated or emotionally abused in other ways by your partner or ex-partner?: No   Housing Stability: Low Risk  (3/19/2024)    Housing Stability     Do you have housing? : Yes     Are you worried about losing your housing?: No           Medications  Allergies   Current Outpatient Medications   Medication Sig Dispense Refill    amLODIPine (NORVASC) 5 MG tablet TAKE 1 TABLET(5 MG) BY MOUTH DAILY 90 tablet 1    aspirin 81 MG tablet [ASPIRIN 81 MG TABLET] Take 81 mg by mouth daily.      atorvastatin (LIPITOR) 80 MG tablet TAKE 1/2 TABLET(40 MG) BY MOUTH EVERY EVENING (Patient taking differently: Take 80 mg by mouth daily TAKE 1/2 TABLET(40 MG) BY MOUTH EVERY EVENING) 45 tablet 3    clopidogrel (PLAVIX) 75 MG tablet TAKE 1 TABLET(75 MG) BY MOUTH DAILY 90 tablet 1    hydrochlorothiazide (HYDRODIURIL) 25 MG tablet TAKE 1 TABLET(25 MG) BY MOUTH DAILY 90 tablet 1    levothyroxine (SYNTHROID/LEVOTHROID) 50 MCG tablet Take 1 tablet (50 mcg) by mouth daily 90 tablet 1    lisinopril (ZESTRIL) 20 MG tablet TAKE ONE TABLET BY MOUTH EVERY  tablet 1    metoprolol succinate ER (TOPROL XL) 25 MG 24 hr tablet TAKE 1 TABLET(25 MG) BY MOUTH DAILY 90 tablet 1    multivitamin with  "minerals (THERA-M) 9 mg iron-400 mcg Tab tablet [MULTIVITAMIN WITH MINERALS (THERA-M) 9 MG IRON-400 MCG TAB TABLET] Take 1 tablet by mouth daily.      omeprazole (PRILOSEC) 20 MG capsule [OMEPRAZOLE (PRILOSEC) 20 MG CAPSULE] Take 20 mg by mouth daily before breakfast.      sildenafil (VIAGRA) 50 MG tablet [SILDENAFIL (VIAGRA) 50 MG TABLET] Take 1 tablet (50 mg total) by mouth daily as needed for erectile dysfunction. 16 tablet 5    albuterol (PROAIR HFA/PROVENTIL HFA/VENTOLIN HFA) 108 (90 Base) MCG/ACT inhaler Inhale 2 puffs into the lungs every 6 hours 18 g 0     No Known Allergies       Lab Results    Chemistry/lipid CBC Cardiac Enzymes/BNP/TSH/INR   Recent Labs   Lab Test 04/05/24  0720   CHOL 143   HDL 48   LDL 72   TRIG 117     Recent Labs   Lab Test 04/05/24  0720 11/29/22  0714 03/26/21  0954   LDL 72 81 73     Recent Labs   Lab Test 04/05/24  0720      POTASSIUM 4.1   CHLORIDE 99   CO2 27   GLC 88   BUN 14.0   CR 0.78   GFRESTIMATED >90   KASI 9.5     Recent Labs   Lab Test 04/05/24  0720 11/29/22  0714 07/14/22  1041   CR 0.78 0.71 0.67     Recent Labs   Lab Test 04/05/24  0720 11/29/22  0714 06/27/19  0820   A1C 5.4 5.6 5.2          Recent Labs   Lab Test 04/05/24  0720   WBC 8.1   HGB 14.6   HCT 41.9   MCV 93        Recent Labs   Lab Test 04/05/24  0720 11/29/22  0714 07/14/22  1041   HGB 14.6 15.2 13.8    No results for input(s): \"TROPONINI\" in the last 16957 hours.  No results for input(s): \"BNP\", \"NTBNPI\", \"NTBNP\" in the last 06005 hours.  Recent Labs   Lab Test 04/05/24  0720   TSH 4.95*     No results for input(s): \"INR\" in the last 16700 hours.       Medications  Allergies   Current Outpatient Medications   Medication Sig Dispense Refill    amLODIPine (NORVASC) 5 MG tablet TAKE 1 TABLET(5 MG) BY MOUTH DAILY 90 tablet 1    aspirin 81 MG tablet [ASPIRIN 81 MG TABLET] Take 81 mg by mouth daily.      atorvastatin (LIPITOR) 80 MG tablet TAKE 1/2 TABLET(40 MG) BY MOUTH EVERY EVENING (Patient " taking differently: Take 80 mg by mouth daily TAKE 1/2 TABLET(40 MG) BY MOUTH EVERY EVENING) 45 tablet 3    clopidogrel (PLAVIX) 75 MG tablet TAKE 1 TABLET(75 MG) BY MOUTH DAILY 90 tablet 1    hydrochlorothiazide (HYDRODIURIL) 25 MG tablet TAKE 1 TABLET(25 MG) BY MOUTH DAILY 90 tablet 1    levothyroxine (SYNTHROID/LEVOTHROID) 50 MCG tablet Take 1 tablet (50 mcg) by mouth daily 90 tablet 1    lisinopril (ZESTRIL) 20 MG tablet TAKE ONE TABLET BY MOUTH EVERY  tablet 1    metoprolol succinate ER (TOPROL XL) 25 MG 24 hr tablet TAKE 1 TABLET(25 MG) BY MOUTH DAILY 90 tablet 1    multivitamin with minerals (THERA-M) 9 mg iron-400 mcg Tab tablet [MULTIVITAMIN WITH MINERALS (THERA-M) 9 MG IRON-400 MCG TAB TABLET] Take 1 tablet by mouth daily.      omeprazole (PRILOSEC) 20 MG capsule [OMEPRAZOLE (PRILOSEC) 20 MG CAPSULE] Take 20 mg by mouth daily before breakfast.      sildenafil (VIAGRA) 50 MG tablet [SILDENAFIL (VIAGRA) 50 MG TABLET] Take 1 tablet (50 mg total) by mouth daily as needed for erectile dysfunction. 16 tablet 5    albuterol (PROAIR HFA/PROVENTIL HFA/VENTOLIN HFA) 108 (90 Base) MCG/ACT inhaler Inhale 2 puffs into the lungs every 6 hours 18 g 0      No Known Allergies     Lab Results   Lab Results   Component Value Date     04/05/2024    CO2 27 04/05/2024    CO2 27 03/26/2021    BUN 14.0 04/05/2024    BUN 12 03/26/2021     Lab Results   Component Value Date    WBC 8.1 04/05/2024    HGB 14.6 04/05/2024    HCT 41.9 04/05/2024    MCV 93 04/05/2024     04/05/2024     Lab Results   Component Value Date    CHOL 143 04/05/2024    TRIG 117 04/05/2024    HDL 48 04/05/2024     Lab Results   Component Value Date    TSH 4.95 04/05/2024    TSH 2.01 03/26/2021                  Thank you for allowing me to participate in the care of your patient.    Sincerely,   Heydi Wilkes MD   [facility]  cc:   Heydi Wilkes MD  [address] [place] [address]  [address]  [place] [address]

## 2024-05-09 NOTE — PROGRESS NOTES
" [facility] [address], [place] [address]   [email or web address]   OFFICE: [phone]            Impression and Plan     1.  Coronary artery disease.  \"Lola" has known coronary disease having had prior myocardial infarction and percutaneous interventions in 1998 and 2008. On 12 August 2008 after the patient had presented with acute occlusion of the right coronary artery. He had 2 stents placed to the vessel.     \"Lola" underwent repeat angiography 14 July 2016 at which time he had PCI with stenting of mid left anterior descending coronary artery ([employer] Synergy SMITHA [id number]) and PCI with stenting of mid circumflex coronary artery ([employer] Synergy SMITHA 3.0x20mm).  See Cardiac Diagnostics section below for details.     \"Lola" underwent an exercise stress test 21 December 2018 that was favorable and revealed no evidence of ischemia and normal functional capacity.     Kyle underwent repeat angiography 4 March 2022 after presenting to [facility] with evidence of an unstable ischemic syndrome.  At that time he had successful PCI with stent deployment to distal right coronary artery (3.5 x 18 mm Samuel prior to crux, and a second 4.0 x 16 mm Synergy SMITHA in mid-distal RCA, overlapping proximally with the old mid RCA stent).     Regadenoson MRI 25 October 2022 revealed no evidence of ischemia.     This is stable.  He denies any chest pain or other symptoms concerning for angina.     2.  PVCs.     Continue metoprolol.     3.  Hypertension.  Blood pressure is reasonable in the office today at 125/74 mmHg.     4.  Dyslipidemia.  Lipid profile 5 April 2024 revealed LDL 72 mg/dL and HDL 48 mg/dL.  Continue high intensity statin therapy.     Plan on follow-up in approximately one year.     35 minutes spent reviewing prior records (including documentation, laboratory studies, cardiac testing/imaging), interview with patient along with physical " "exam, planning, and subsequent documentation/crafting of note).           History of Present Illness    Once again I would like to thank you again for asking me to participate in the care of your patient, Josafat Madera.  As you know, but to reiterate for my own records, Josafat Madera is a 73 year old male with known coronary artery disease. Specifically, \"Lola" has known coronary disease having had prior myocardial infarction and percutaneous interventions in 1998 and 2008. On 12 August 2008 after \"Lola" had presented with acute occlusion of the right coronary artery. He had 2 stents placed to the vessel.     \"Lola" underwent repeat angiography 14 July 2016 at which time he had PCI with stenting of mid left anterior descending coronary artery ([employer] Synergy SMITHA [id number]) and PCI with stenting of mid circumflex coronary artery ([employer] Synergy SMITHA 3.0x20mm).  See Cardiac Diagnostics section below for details.     Kyle underwent an exercise stress test 21 December 2018 that was favorable and revealed no evidence of ischemia and normal functional capacity.     Kyle underwent repeat angiography 4 March 2022 after presenting to [facility] with evidence of a unstable ischemic syndrome.  At that time he had successful PCI with stent deployment to distal right coronary artery (3.5 x 18 mm Samuel prior to crux, and a second 4.0 x 16 mm Synergy SMITHA in mid-distal RCA, overlapping proximally with the old mid RCA stent).     On interview today, \"Lola" states that he has been doing well from a cardiac standpoint.  He specifically denies any chest pain, shortness of breath, or subjective decline in exercise tolerance.  He denies palpitations or lightheadedness.    Further review of systems is otherwise negative/noncontributory (medical record and 13 point review of systems reviewed as well and pertinent positives noted).         Cardiac Diagnostics      Regadenoson MRI 25 October 2022:  Pharmacological " Regadenoson stress cardiac MRI is negative for inducible myocardial ischemia.   Pharmacological stress ECG is negative for inducible myocardial ischemia.   Normal left ventricular size, wall thickness with normal ejection fraction. Moderate hypokinesis of the basal inferolateral wall segment. Left ventricular ejection fraction is 60-65% by visual estimation of real time images.   There is small area of transmural infarction involving the basal to mid inferolateral wall.   Non-transmural infarction of the basal inferoseptal and inferior wall segments of the left ventricle representing viable myocardium.      Normal right ventricular size and systolic function.    No evidence of significant valvular abnormalities.    Echocardiogram 5 March 2022 (performed at [facility]):  Normal left ventricular size and systolic performance with ejection fraction of 65%.  Mid basal to mid inferior hypokinesis.  Remaining segments appear hyperdynamic.  Mild septal thickening.  No significant valvular heart disease.  Normal right ventricular size and systolic performance.  Atrial level visualized.    Echocardiogram for December 2020 (personally reviewed):  Normal left ventricular size and systolic performance with a visually estimated ejection fraction of 60-65%.   No significant valvular heart disease is identified on this study.   Normal right ventricular size and systolic performance.   Normal atrial dimensions.    Exercise stress test 13 October 2020 (formally reviewed by Dr. Maxx Russell ([place]) and also personally reviewed):  No ECG evidence of ischemia.  No chest pain symptoms with exercise.  Exercise-induced PVCs and short run of nonsustained VT was noted (5 beat).    Exercise stress test 19 December 2018:  No ECG evidence of ischemia.  No chest pain symptoms reported with exercise.  Normal functional capacity.    Nuclear stress perfusion study 1 July 2016 (pre-coronary angiogram which was performed 14 July  2016):  There is a large, severe perfusion defect involving the entire lateral wall on the left ventricle that redistributes on the rest images consistent with ischemia.  Normal left ventricular systolic performance with ejection fraction of 54% with lateral hypokinesis.    Nuclear stress perfusion study 28 October 2008:  Medium-sized area of non-transmural infarction in the inferior wall.  Normal left ventricular systolic performance with ejection fraction of 66% with very small area of basal inferior hypokinesis.    Coronary angiogram 4 March 2022 (performed at [facility]).  Left main coronary artery: No significant stenosis.  Left anterior descending coronary artery: Mid 30-40% stenosis.  Mid 50-60% stenosis.  Circumflex coronary artery: Large vessel with mild proximal disease.  Right coronary artery: Mild to moderate in-stent restenosis of proximal-mid RCA stent.  50% in-stent restenosis involving proximal right coronary artery stent.  Acute total occlusion of distal right coronary artery just beyond edge of the mid right coronary artery stent.  90% stenosis in the distal right coronary artery prior to the crux.  Successful PCI with stent deployment to distal right coronary artery (3.5 x 18 mm Peak prior to crux, and a second 4.0 x 16 mm Synergy SMITHA in mid-distal RCA, overlapping proximally with the old mid RCA stent).    Coronary angiography 14 July 2016:  Left anterior descending coronary artery: Mid 75% stenosis.  Left circumflex coronary artery: Mid 90% stenosis with more severe distal stenosis.  Right coronary artery: 25% proximal stenosis and 40% distal stenosis.  Status post PCI with stenting of mid left anterior descending coronary artery ([employer] Synergy SMITHA [id number]).  Status post PCI with stenting of mid circumflex coronary artery ([employer] Synergy SMITHA 3.0x20mm).  Recommendations:  Consider medical therapy with possible PCI of distal LCX if symptoms not controlled. However  small in caliber and likely too small for stent  Continue with prasugrel (Effient  ) for 12 months, but if necessary could discontinue earlier at 3 months. If tolerated would advocate 12 month course.    CT coronary angiography 22 May 2014:  Left main without significant stenosis.  Left anterior descending has a calcified plaque from distal left main and other calcified plaques and soft plaques in the mid left anterior descending which cause moderate stenosis of 50-60%. It appears to be non-obstructive in the left anterior descending.  Mild to moderate disease in the left circumflex.  There is mild disease in the proximal right coronary artery. The stent in the right coronary artery is patent. The distal right coronary artery stent is not able to be evaluated because of significant artifact.    Coronary angiography 12 August 2008 (performed at [facility]) :  Left main coronary artery: Mild irregularities.  Left anterior descending coronary artery: 70-80% stenosis at the branch point of the first major diagonal.  Circumflex artery with small obtuse marginal branch occlusion.  Right coronary artery: Vessel occluded in the proximal third portion. Culprit vessel for myocardial infarction.  PCI with stent placement (3.5×12 mm drug-eluting stent) to right coronary artery.    Holter monitor 22 October 2020:  Frequent PVCs.  Most of the PVCs are from a single site, but somecomplexity is noted including frequent couplets and some triplets.              Physical Examination       /74 (BP Location: Left arm, Patient Position: Sitting, Cuff Size: Adult Large)   Pulse 80   Resp 16   Wt 95.7 kg (211 lb)   SpO2 95%   BMI 31.62 kg/m          Wt Readings from Last 3 Encounters:   05/09/24 95.7 kg (211 lb)   04/15/24 98.4 kg (217 lb)   03/26/24 98.4 kg (217 lb)       The patient is alert and oriented times three. Sclerae are anicteric. Mucosal membranes are moist. Jugular venous pressure is  normal. No significant adenopathy/thyromegally appreciated. Lungs are clear with good expansion. On cardiovascular exam, the patient has a regular S1 and S2. Abdomen is soft and non-tender. Extremities reveal no clubbing, cyanosis, or edema.       Family History/Social History/Risk Factors   Patient does not smoke.  Family history reviewed, and family history includes Cerebrovascular Disease in his mother; Crohn's Disease in his sister; Diabetes in his brother; Heart Disease in his father and mother.          Medical History  Surgical History Family History Social History   Past Medical History:   Diagnosis Date    Bladder cancer (H)     see [facility] notes    Carpal tunnel syndrome     Cataract     Coronary artery disease     GERD (gastroesophageal reflux disease)     Hypercholesteremia     Hypertension     Lower back pain     Scoliosis      Past Surgical History:   Procedure Laterality Date    ANGIOPLASTY  1998/2008    BLADDER SURGERY  03/01/2014    Polyp removal    HC REMOVAL OF TONSILS,<13 Y/O      Description: Tonsillectomy;  Recorded: 06/10/2008;    HERNIA REPAIR  01/01/2009    Bilateral    PHACOEMULSIFICATION CLEAR CORNEA WITH STANDARD INTRAOCULAR LENS IMPLANT Right 07/10/2014    Procedure: Right Cataract Extraction with Intraocular Lens Implantation;  Surgeon: Karl Nava MD;  Location: [facility];  Service:     PHACOEMULSIFICATION CLEAR CORNEA WITH STANDARD INTRAOCULAR LENS IMPLANT Left 08/14/2014    Procedure: CATARACT EXTRACTION WITH INTRAOCULAR LENS IMPLANTATION LEFT EYE;  Surgeon: Karl Nava MD;  Location: [facility];  Service:     right finger surgery Right     Extensor Tendon Repair    TONSILLECTOMY       Family History   Problem Relation Age of Onset    Cerebrovascular Disease Mother     Heart Disease Mother     Heart Disease Father     Crohn's Disease Sister     Diabetes Brother         Social History     Socioeconomic History    Marital status:      Spouse name: Not on  file    Number of children: Not on file    Years of education: Not on file    Highest education level: Not on file   Occupational History    Not on file   Tobacco Use    Smoking status: Former     Types: Cigars     Quit date:      Years since quittin.3     Passive exposure: Never    Smokeless tobacco: Never    Tobacco comments:     occasional cigar   Vaping Use    Vaping status: Never Used   Substance and Sexual Activity    Alcohol use: Yes     Comment: 2 drinks per month    Drug use: No    Sexual activity: Yes     Partners: Female   Other Topics Concern    Not on file   Social History Narrative    Not on file     Social Determinants of Health     Financial Resource Strain: Low Risk  (3/19/2024)    Financial Resource Strain     Within the past 12 months, have you or your family members you live with been unable to get utilities (heat, electricity) when it was really needed?: No   Food Insecurity: Low Risk  (3/19/2024)    Food Insecurity     Within the past 12 months, did you worry that your food would run out before you got money to buy more?: No     Within the past 12 months, did the food you bought just not last and you didn t have money to get more?: No   Transportation Needs: Low Risk  (3/19/2024)    Transportation Needs     Within the past 12 months, has lack of transportation kept you from medical appointments, getting your medicines, non-medical meetings or appointments, work, or from getting things that you need?: No   Physical Activity: Insufficiently Active (3/19/2024)    Exercise Vital Sign     Days of Exercise per Week: 3 days     Minutes of Exercise per Session: 40 min   Stress: No Stress Concern Present (3/19/2024)    [employer] [place] [employer] - Occupational Stress Questionnaire     Feeling of Stress : Only a little   Social Connections: Unknown (3/19/2024)    Social Connection and Isolation Panel [NHANES]     Frequency of Communication with Friends and Family: Not on file      Frequency of Social Gatherings with Friends and Family: Three times a week     Attends Faith Services: Not on file     Active Member of Clubs or Organizations: Not on file     Attends Club or Organization Meetings: Not on file     Marital Status: Not on file   Interpersonal Safety: Low Risk  (3/26/2024)    Interpersonal Safety     Do you feel physically and emotionally safe where you currently live?: Yes     Within the past 12 months, have you been hit, slapped, kicked or otherwise physically hurt by someone?: No     Within the past 12 months, have you been humiliated or emotionally abused in other ways by your partner or ex-partner?: No   Housing Stability: Low Risk  (3/19/2024)    Housing Stability     Do you have housing? : Yes     Are you worried about losing your housing?: No           Medications  Allergies   Current Outpatient Medications   Medication Sig Dispense Refill    amLODIPine (NORVASC) 5 MG tablet TAKE 1 TABLET(5 MG) BY MOUTH DAILY 90 tablet 1    aspirin 81 MG tablet [ASPIRIN 81 MG TABLET] Take 81 mg by mouth daily.      atorvastatin (LIPITOR) 80 MG tablet TAKE 1/2 TABLET(40 MG) BY MOUTH EVERY EVENING (Patient taking differently: Take 80 mg by mouth daily TAKE 1/2 TABLET(40 MG) BY MOUTH EVERY EVENING) 45 tablet 3    clopidogrel (PLAVIX) 75 MG tablet TAKE 1 TABLET(75 MG) BY MOUTH DAILY 90 tablet 1    hydrochlorothiazide (HYDRODIURIL) 25 MG tablet TAKE 1 TABLET(25 MG) BY MOUTH DAILY 90 tablet 1    levothyroxine (SYNTHROID/LEVOTHROID) 50 MCG tablet Take 1 tablet (50 mcg) by mouth daily 90 tablet 1    lisinopril (ZESTRIL) 20 MG tablet TAKE ONE TABLET BY MOUTH EVERY  tablet 1    metoprolol succinate ER (TOPROL XL) 25 MG 24 hr tablet TAKE 1 TABLET(25 MG) BY MOUTH DAILY 90 tablet 1    multivitamin with minerals (THERA-M) 9 mg iron-400 mcg Tab tablet [MULTIVITAMIN WITH MINERALS (THERA-M) 9 MG IRON-400 MCG TAB TABLET] Take 1 tablet by mouth daily.      omeprazole (PRILOSEC) 20 MG capsule  "[OMEPRAZOLE (PRILOSEC) 20 MG CAPSULE] Take 20 mg by mouth daily before breakfast.      sildenafil (VIAGRA) 50 MG tablet [SILDENAFIL (VIAGRA) 50 MG TABLET] Take 1 tablet (50 mg total) by mouth daily as needed for erectile dysfunction. 16 tablet 5    albuterol (PROAIR HFA/PROVENTIL HFA/VENTOLIN HFA) 108 (90 Base) MCG/ACT inhaler Inhale 2 puffs into the lungs every 6 hours 18 g 0     No Known Allergies       Lab Results    Chemistry/lipid CBC Cardiac Enzymes/BNP/TSH/INR   Recent Labs   Lab Test 04/05/24  0720   CHOL 143   HDL 48   LDL 72   TRIG 117     Recent Labs   Lab Test 04/05/24 0720 11/29/22  0714 03/26/21  0954   LDL 72 81 73     Recent Labs   Lab Test 04/05/24 0720      POTASSIUM 4.1   CHLORIDE 99   CO2 27   GLC 88   BUN 14.0   CR 0.78   GFRESTIMATED >90   KASI 9.5     Recent Labs   Lab Test 04/05/24 0720 11/29/22  0714 07/14/22  1041   CR 0.78 0.71 0.67     Recent Labs   Lab Test 04/05/24 0720 11/29/22  0714 06/27/19  0820   A1C 5.4 5.6 5.2          Recent Labs   Lab Test 04/05/24 0720   WBC 8.1   HGB 14.6   HCT 41.9   MCV 93        Recent Labs   Lab Test 04/05/24 0720 11/29/22  0714 07/14/22  1041   HGB 14.6 15.2 13.8    No results for input(s): \"TROPONINI\" in the last 37313 hours.  No results for input(s): \"BNP\", \"NTBNPI\", \"NTBNP\" in the last 68815 hours.  Recent Labs   Lab Test 04/05/24 0720   TSH 4.95*     No results for input(s): \"INR\" in the last 70974 hours.       Medications  Allergies   Current Outpatient Medications   Medication Sig Dispense Refill    amLODIPine (NORVASC) 5 MG tablet TAKE 1 TABLET(5 MG) BY MOUTH DAILY 90 tablet 1    aspirin 81 MG tablet [ASPIRIN 81 MG TABLET] Take 81 mg by mouth daily.      atorvastatin (LIPITOR) 80 MG tablet TAKE 1/2 TABLET(40 MG) BY MOUTH EVERY EVENING (Patient taking differently: Take 80 mg by mouth daily TAKE 1/2 TABLET(40 MG) BY MOUTH EVERY EVENING) 45 tablet 3    clopidogrel (PLAVIX) 75 MG tablet TAKE 1 TABLET(75 MG) BY MOUTH DAILY 90 tablet " 1    hydrochlorothiazide (HYDRODIURIL) 25 MG tablet TAKE 1 TABLET(25 MG) BY MOUTH DAILY 90 tablet 1    levothyroxine (SYNTHROID/LEVOTHROID) 50 MCG tablet Take 1 tablet (50 mcg) by mouth daily 90 tablet 1    lisinopril (ZESTRIL) 20 MG tablet TAKE ONE TABLET BY MOUTH EVERY  tablet 1    metoprolol succinate ER (TOPROL XL) 25 MG 24 hr tablet TAKE 1 TABLET(25 MG) BY MOUTH DAILY 90 tablet 1    multivitamin with minerals (THERA-M) 9 mg iron-400 mcg Tab tablet [MULTIVITAMIN WITH MINERALS (THERA-M) 9 MG IRON-400 MCG TAB TABLET] Take 1 tablet by mouth daily.      omeprazole (PRILOSEC) 20 MG capsule [OMEPRAZOLE (PRILOSEC) 20 MG CAPSULE] Take 20 mg by mouth daily before breakfast.      sildenafil (VIAGRA) 50 MG tablet [SILDENAFIL (VIAGRA) 50 MG TABLET] Take 1 tablet (50 mg total) by mouth daily as needed for erectile dysfunction. 16 tablet 5    albuterol (PROAIR HFA/PROVENTIL HFA/VENTOLIN HFA) 108 (90 Base) MCG/ACT inhaler Inhale 2 puffs into the lungs every 6 hours 18 g 0      No Known Allergies       Lab Results   Lab Results   Component Value Date     04/05/2024    CO2 27 04/05/2024    CO2 27 03/26/2021    BUN 14.0 04/05/2024    BUN 12 03/26/2021     Lab Results   Component Value Date    WBC 8.1 04/05/2024    HGB 14.6 04/05/2024    HCT 41.9 04/05/2024    MCV 93 04/05/2024     04/05/2024     Lab Results   Component Value Date    CHOL 143 04/05/2024    TRIG 117 04/05/2024    HDL 48 04/05/2024     Lab Results   Component Value Date    TSH 4.95 04/05/2024    TSH 2.01 03/26/2021

## 2024-06-25 ENCOUNTER — TRANSFERRED RECORDS (OUTPATIENT)
Dept: HEALTH INFORMATION MANAGEMENT | Facility: CLINIC | Age: 74
End: 2024-06-25
Payer: COMMERCIAL

## 2024-07-01 DIAGNOSIS — I10 ESSENTIAL HYPERTENSION, BENIGN: ICD-10-CM

## 2024-07-01 DIAGNOSIS — I10 ESSENTIAL HYPERTENSION: ICD-10-CM

## 2024-07-01 RX ORDER — AMLODIPINE BESYLATE 5 MG/1
TABLET ORAL
Qty: 90 TABLET | Refills: 1 | Status: SHIPPED | OUTPATIENT
Start: 2024-07-01

## 2024-07-01 RX ORDER — HYDROCHLOROTHIAZIDE 25 MG/1
TABLET ORAL
Qty: 90 TABLET | Refills: 1 | Status: SHIPPED | OUTPATIENT
Start: 2024-07-01

## 2024-07-02 RX ORDER — LISINOPRIL 20 MG/1
20 TABLET ORAL DAILY
Qty: 180 TABLET | Refills: 1 | Status: SHIPPED | OUTPATIENT
Start: 2024-07-02

## 2024-07-02 RX ORDER — METOPROLOL SUCCINATE 25 MG/1
TABLET, EXTENDED RELEASE ORAL
Qty: 90 TABLET | Refills: 3 | Status: SHIPPED | OUTPATIENT
Start: 2024-07-02

## 2024-07-08 DIAGNOSIS — I21.11 ST ELEVATION MYOCARDIAL INFARCTION INVOLVING RIGHT CORONARY ARTERY (H): ICD-10-CM

## 2024-07-08 DIAGNOSIS — I25.10 CORONARY ARTERY DISEASE INVOLVING NATIVE CORONARY ARTERY OF NATIVE HEART WITHOUT ANGINA PECTORIS: ICD-10-CM

## 2024-07-08 RX ORDER — CLOPIDOGREL BISULFATE 75 MG/1
75 TABLET ORAL DAILY
Qty: 90 TABLET | Refills: 1 | Status: SHIPPED | OUTPATIENT
Start: 2024-07-08

## 2024-07-10 ENCOUNTER — LAB (OUTPATIENT)
Dept: LAB | Facility: CLINIC | Age: 74
End: 2024-07-10
Payer: COMMERCIAL

## 2024-07-10 DIAGNOSIS — E03.9 HYPOTHYROIDISM, UNSPECIFIED TYPE: ICD-10-CM

## 2024-07-10 LAB — TSH SERPL DL<=0.005 MIU/L-ACNC: 2.9 UIU/ML (ref 0.3–4.2)

## 2024-07-10 PROCEDURE — 36415 COLL VENOUS BLD VENIPUNCTURE: CPT

## 2024-07-10 PROCEDURE — 84443 ASSAY THYROID STIM HORMONE: CPT

## 2024-07-17 ENCOUNTER — MYC MEDICAL ADVICE (OUTPATIENT)
Dept: INTERNAL MEDICINE | Facility: CLINIC | Age: 74
End: 2024-07-17
Payer: COMMERCIAL

## 2024-07-17 DIAGNOSIS — E03.9 HYPOTHYROIDISM, UNSPECIFIED TYPE: Primary | ICD-10-CM

## 2024-08-13 ENCOUNTER — TRANSFERRED RECORDS (OUTPATIENT)
Dept: HEALTH INFORMATION MANAGEMENT | Facility: CLINIC | Age: 74
End: 2024-08-13
Payer: COMMERCIAL

## 2024-08-27 ENCOUNTER — TRANSFERRED RECORDS (OUTPATIENT)
Dept: HEALTH INFORMATION MANAGEMENT | Facility: CLINIC | Age: 74
End: 2024-08-27

## 2024-09-16 ENCOUNTER — PATIENT OUTREACH (OUTPATIENT)
Dept: GASTROENTEROLOGY | Facility: CLINIC | Age: 74
End: 2024-09-16
Payer: COMMERCIAL

## 2024-09-16 DIAGNOSIS — Z12.11 SPECIAL SCREENING FOR MALIGNANT NEOPLASMS, COLON: Primary | ICD-10-CM

## 2024-09-16 NOTE — PROGRESS NOTES
"CRC Screening Colonoscopy Referral Review    Patient meets the inclusion criteria for screening colonoscopy standing order.    Ordering/Referring Provider:  Omega Wilkerson      BMI: Estimated body mass index is 31.62 kg/m  as calculated from the following:    Height as of 3/26/24: 1.74 m (5' 8.5\").    Weight as of 5/9/24: 95.7 kg (211 lb).     Sedation:  Does patient have any of the following conditions affecting sedation?  No medical conditions affecting sedation.    Previous Scopes:  Any previous recommendations or follow up needs based on previous scope?  na / No recommendations.    Medical Concerns to Postpone Order:  Does patient have any of the following medical concerns that should postpone/delay colonoscopy referral?  No medical conditions affecting colonoscopy referral.    Final Referral Details:  Based on patient's medical history patient is appropriate for referral order with moderate sedation. If patient's BMI > 50 do not schedule in ASC.  "

## 2024-09-29 DIAGNOSIS — E78.00 PURE HYPERCHOLESTEROLEMIA: ICD-10-CM

## 2024-09-30 RX ORDER — ATORVASTATIN CALCIUM 80 MG/1
TABLET, FILM COATED ORAL
Qty: 45 TABLET | Refills: 1 | Status: SHIPPED | OUTPATIENT
Start: 2024-09-30

## 2024-11-06 ENCOUNTER — MEDICAL CORRESPONDENCE (OUTPATIENT)
Dept: HEALTH INFORMATION MANAGEMENT | Facility: CLINIC | Age: 74
End: 2024-11-06
Payer: COMMERCIAL

## 2024-11-06 ENCOUNTER — TRANSFERRED RECORDS (OUTPATIENT)
Dept: HEALTH INFORMATION MANAGEMENT | Facility: CLINIC | Age: 74
End: 2024-11-06
Payer: COMMERCIAL

## 2024-11-07 ENCOUNTER — TRANSCRIBE ORDERS (OUTPATIENT)
Dept: OTHER | Age: 74
End: 2024-11-07

## 2024-11-07 DIAGNOSIS — R10.2 PELVIC AND PERINEAL PAIN: Primary | ICD-10-CM

## 2024-12-11 ENCOUNTER — PRE VISIT (OUTPATIENT)
Dept: UROLOGY | Facility: CLINIC | Age: 74
End: 2024-12-11
Payer: COMMERCIAL

## 2024-12-11 NOTE — TELEPHONE ENCOUNTER
Reason for visit: Pelvic and perineal pain     Relevant information: Diverticulosis of large intestine without perforation of abscess without bleeding    Records/imaging/labs/orders: All records available    At Rooming:  Standard rooming      Alfred Arshad  12/11/2024  11:13 AM

## 2024-12-11 NOTE — TELEPHONE ENCOUNTER
Action 24  10:02 AM MARGI   Action Taken  Request for records faxed to [facility] ([phone]) for full chart notes and reports.     Action 24  12:25 PM MARGI   Action Taken  Records received from [facility] and faxed to urgent scanning.       MEDICAL RECORDS REQUEST   [facility]  [address] [place] [address]  PHONE: [phone]  Fax: [phone]        FUTURE VISIT INFORMATION                                                   Josafat Madera, : 1950 scheduled for future visit at [facility]    APPOINTMENT INFORMATION:  Date: 2025  Provider:  Jason Ham MD  Reason for Visit/Diagnosis: Pelvic and perineal pain [R10.2]    REFERRAL INFORMATION:  Referring provider:  Pedro Guerrero MD  Referring providers clinic:  [facility]     RECORDS REQUESTED FOR VISIT                                                     NOTES  STATUS/DETAILS   OFFICE NOTE from referring provider MEDIA TAB yes - [facility]: 24   OPERATIVE REPORT  in process   MEDICATION LIST  yes   LABS     URINALYSIS (UA)  in process   IMAGES & REPORTS  yes -   US TESTICULAR: 24     PRE-VISIT CHECKLIST      Joint diagnostic appointment coordinated correctly          (ensure right order & amount of time) Yes   RECORD COLLECTION COMPLETE If no, please explain PENDING

## 2024-12-28 DIAGNOSIS — I10 ESSENTIAL HYPERTENSION, BENIGN: ICD-10-CM

## 2024-12-30 RX ORDER — HYDROCHLOROTHIAZIDE 25 MG/1
TABLET ORAL
Qty: 90 TABLET | Refills: 0 | Status: SHIPPED | OUTPATIENT
Start: 2024-12-30

## 2024-12-30 RX ORDER — AMLODIPINE BESYLATE 5 MG/1
TABLET ORAL
Qty: 90 TABLET | Refills: 0 | Status: SHIPPED | OUTPATIENT
Start: 2024-12-30

## 2025-01-02 ENCOUNTER — PRE VISIT (OUTPATIENT)
Dept: UROLOGY | Facility: CLINIC | Age: 75
End: 2025-01-02

## 2025-01-15 ENCOUNTER — TRANSFERRED RECORDS (OUTPATIENT)
Dept: HEALTH INFORMATION MANAGEMENT | Facility: CLINIC | Age: 75
End: 2025-01-15
Payer: COMMERCIAL

## 2025-01-17 ENCOUNTER — TRANSFERRED RECORDS (OUTPATIENT)
Dept: HEALTH INFORMATION MANAGEMENT | Facility: CLINIC | Age: 75
End: 2025-01-17
Payer: COMMERCIAL

## 2025-01-23 ENCOUNTER — TRANSFERRED RECORDS (OUTPATIENT)
Dept: HEALTH INFORMATION MANAGEMENT | Facility: CLINIC | Age: 75
End: 2025-01-23

## 2025-01-23 ENCOUNTER — THERAPY VISIT (OUTPATIENT)
Dept: PHYSICAL THERAPY | Facility: CLINIC | Age: 75
End: 2025-01-23
Attending: UROLOGY
Payer: COMMERCIAL

## 2025-01-23 DIAGNOSIS — R10.2 PELVIC AND PERINEAL PAIN: ICD-10-CM

## 2025-01-23 NOTE — PROGRESS NOTES
PHYSICAL THERAPY EVALUATION  Type of Visit: Evaluation     Fall Risk Screen:  Fall screen completed by: PT  Have you fallen 2 or more times in the past year?: No  Have you fallen and had an injury in the past year?: No  Is patient a fall risk?: No    Subjective 2 year history of right groin pain radiating into right testicle when ejaculating. Pt does not notice pain with any other activities. Pt referred for physical therapy on 25        Presenting condition or subjective complaint: Pain on the right side of the groin during ejaculation  Date of onset: 25    Relevant medical history: Arthritis; Bladder or bowel problems; Heart problems; High blood pressure; Overweight   Dates & types of surgery: Hernia 10+ years; leg for a broken bone 1.5 years ago; cataract 10 years ago; finger 15 years ago.    Prior diagnostic imaging/testing results: CT scan; X-ray     Prior therapy history for the same diagnosis, illness or injury: No      Prior Level of Function  Transfers: Independent  Ambulation: Independent  ADL: Independent  IADL: Driving, Housekeeping    Living Environment  Social support: With a significant other or spouse   Type of home: House; 1 level   Stairs to enter the home: Yes 5 Is there a railing: Yes     Ramp: No   Stairs inside the home: Yes 15 Is there a railing: Yes     Help at home: None  Equipment owned:       Employment: No    Hobbies/Interests:      Patient goals for therapy:      Pain assessment: Pain present  Location: right groin radiating into right testicle /Ratin/10     Objective      PELVIC EVALUATION  ADDITIONAL HISTORY:  Sex assigned at birth: Male  Gender identity: Male    Pronouns:        Bladder History:  Feels bladder filling: Yes  Triggers for feeling of inability to wait to go to the bathroom: Yes Warm running water  How long can you wait to urinate: 30 - 60 minutes  Gets up at night to urinate: Yes 1  Can stop the flow of urine when urinating: Sometimes  Volume of urine  usually released: Average   Other issues:    Number of bladder infections in last 12 months:    Fluid intake per day: 24 - 32 oz 24 - 32 oz    Medications taken for bladder: No     Activities causing urine leak: Hurrying to the bathroom due to a strong urge to urinate (pee)    Amount of urine typically leaked: Very small amount  Pads used to help with leaking: No        Bowel History:  Frequency of bowel movement: 2 - 3 times per day  Consistency of stool: Soft-formed    Ignores the urge to defecate: No  Other bowel issues: Loss of gas  Length of time spent trying to have a bowel movement:      Sexual Function History:  Sexual orientation: Straight    Sexually active: Yes  Lubrication used: Yes Yes  Pelvic pain: Walking    Pain or difficulty with orgasms/erection/ejaculation: Yes Sharp pain on the right side of the groin during ejaculation  State of menopause:    Hormone medications:        Are you currently pregnant: No  Do you get regular exercise: Yes  I do this type of exercise: Water exercise at the [facility]  Have you tried pelvic floor strengthening exercises for 4 weeks: No  Do you have any history of trauma that is relevant to your care that you d like to share: No      Discussed reason for referral regarding pelvic health needs and external/internal pelvic floor muscle examination with patient/guardian.  Opportunity provided to ask questions and verbal consent for assessment and intervention was given.    PAIN: Pain Level at Rest: 0/10  Pain Level with Use: 7/10  Pain Location: right groin into the right testicle   Pain Quality: Sharp  Pain Frequency: intermittent  Pain is Worst: daytime  Pain is Exacerbated By: ejaculation  Pain is Relieved By: rest  Pain Progression: Unchanged  POSTURE:  increased lumbar lordosis  LUMBAR SCREEN:  moderate limitation with lumbar AROM in all directions but did not reproduce symptoms   HIP SCREEN:  Strength: WNL   Functional Strength Testing:     PELVIC/SI SCREEN:  Pelvic base  even    PAIN PROVOCATION TEST: WNL  PELVIS/SI SPECIAL TESTS: WNL  BREATHING SYMMETRY: WNL    PELVIC EXAM  External Visual Inspection:      Integumentary:       External Digital Palpation per Perineum:       Scar:   Location/Type:   Mobility:     Internal Digital Palpation:  Per Vagina:      Per Rectum:        Pelvic Organ Prolapse:       ABDOMINAL ASSESSMENT  Diastasis Rectus Abdominis (JOSE MARIA):      Abdominal Activation/Strength:  weak lower abdominals and pelvic stabilizers    Scar:   Location/Type:   Mobility:     Fascial Tension/Restriction:     BIOFEEDBACK:  Position:   Surface Electrodes:     Abdominals:     Perianals:       DERMATOMES:   DTR S:   Point tenderness bilateral iliopsoas. Decreased flexibility bilateral hamstrings, hip flexors/adductors and piriformis   Assessment & Plan   CLINICAL IMPRESSIONS  Medical Diagnosis: right pelvic and perineal pain    Treatment Diagnosis:     Impression/Assessment: Patient is a 74 year old male with right pelvic  complaints.  The following significant findings have been identified: Pain and Decreased ROM/flexibility. These impairments interfere with their ability to perform self care tasks and recreational activities as compared to previous level of function.     Clinical Decision Making (Complexity):  Clinical Presentation: Stable/Uncomplicated  Clinical Presentation Rationale: based on medical and personal factors listed in PT evaluation  Clinical Decision Making (Complexity): Low complexity    PLAN OF CARE  Treatment Interventions:  Interventions: Manual Therapy, Therapeutic Exercise    Long Term Goals     PT Goal 1  Goal Identifier: ejaculation  Goal Description: pt able to ejaculate pain level 2  Rationale: to maximize safety and independence with self cares;to maximize safety and independence with performance of ADLs and functional tasks  Target Date: 04/03/25      Frequency of Treatment: 1x/week  Duration of Treatment: 10 weeks    Recommended Referrals to Other  Professionals:   Education Assessment:   Learner/Method: No Barriers to Learning    Risks and benefits of evaluation/treatment have been explained.   Patient/Family/caregiver agrees with Plan of Care.     Evaluation Time:     PT Eval, Low Complexity Minutes (60035): 30       Signing Clinician: WILLIAM Nair [facility]                                                                                   OUTPATIENT PHYSICAL THERAPY      PLAN OF TREATMENT FOR OUTPATIENT REHABILITATION   Patient's Last Name, First Name, Josafat Myers YOB: 1950   Provider's Name   [facility]   Medical Record No.  [id number]     Onset Date: 01/02/25  Start of Care Date: 01/23/25     Medical Diagnosis:  right pelvic and perineal pain      PT Treatment Diagnosis:    Plan of Treatment  Frequency/Duration: 1x/week/ 10 weeks    Certification date from 01/23/25 to 04/03/25         See note for plan of treatment details and functional goals     Gonsalo Nielsen, PT                         I CERTIFY THE NEED FOR THESE SERVICES FURNISHED UNDER        THIS PLAN OF TREATMENT AND WHILE UNDER MY CARE     (Physician attestation of this document indicates review and certification of the therapy plan).              Referring Provider:  Jason Ham    Initial Assessment  See Epic Evaluation- Start of Care Date: 01/23/25

## 2025-02-05 ENCOUNTER — TRANSFERRED RECORDS (OUTPATIENT)
Dept: HEALTH INFORMATION MANAGEMENT | Facility: CLINIC | Age: 75
End: 2025-02-05
Payer: COMMERCIAL

## 2025-02-06 ENCOUNTER — THERAPY VISIT (OUTPATIENT)
Dept: PHYSICAL THERAPY | Facility: CLINIC | Age: 75
End: 2025-02-06
Attending: UROLOGY
Payer: COMMERCIAL

## 2025-02-06 DIAGNOSIS — R10.2 PELVIC AND PERINEAL PAIN: Primary | ICD-10-CM

## 2025-02-13 ENCOUNTER — THERAPY VISIT (OUTPATIENT)
Dept: PHYSICAL THERAPY | Facility: CLINIC | Age: 75
End: 2025-02-13
Payer: COMMERCIAL

## 2025-02-13 DIAGNOSIS — R10.2 PELVIC AND PERINEAL PAIN: Primary | ICD-10-CM

## 2025-02-13 NOTE — PROGRESS NOTES
02/13/25 0500   Appointment Info   Signing clinician's name / credentials zheng miller pt   Total/Authorized Visits 10   Visits Used 3   Medical Diagnosis right pelvic and perineal pain   Other pertinent information re-eval after 6 visits   Progress Note/Certification   Start of Care Date 01/23/25   Onset of illness/injury or Date of Surgery 01/02/25   Therapy Frequency 1x/week   Predicted Duration 10 weeks   Certification date from 01/23/25   Certification date to 04/03/25   PT Goal 1   Goal Identifier ejaculation   Goal Description pt able to ejaculate pain level 2   Rationale to maximize safety and independence with self cares;to maximize safety and independence with performance of ADLs and functional tasks   Goal Progress progressing   Target Date 04/03/25   Subjective Report   Subjective Report pt tolerating exercises well and feels like symptoms are improving. pt feels ready to graduate to [facility]   Objective Measures   Objective Measures Objective Measure 1   Objective Measure 1   Objective Measure lower extremity flexibility continues to improve. pt instructed in core strengthening today   Treatment Interventions (PT)   Interventions Therapeutic Procedure/Exercise;Neuromuscular Re-education   Therapeutic Procedure/Exercise   Therapeutic Procedures: strength, endurance, ROM, flexibility minutes (02329) 34   Therapeutic Procedures Ther Proc 2;Ther Proc 3;Ther Proc 4;Ther Proc 5;Ther Proc 6   Ther Proc 1 supine piriformis >90   Ther Proc 1 - Details 3 x 10 seconds   Ther Proc 2 standing hamstring   Ther Proc 2 - Details 3 x 10 seconds   Ther Proc 3 butterfly with over pressure   Ther Proc 3 - Details 3 x 10 seconds   Ther Proc 4 hip flexor stretch on chair   Ther Proc 4 - Details 3 x 10 seconds   Skilled Intervention verbal cueing   Patient Response/Progress tolerated well   Ther Proc 5 pelvic tilt   Ther Proc 5 - Details 10x 5 seconds   Ther Proc 6 supine abdominal #4 10x   Ther Proc 6 - Details bridge 10 x 5  seconds   Neuromuscular Re-education   Neuromuscular Re-education Neuro Re-ed 8   Neuro Re-ed 1 breathing with the diaphragm with guided relaxation   Neuro Re-ed 1 - Details 8 minutes   Skilled Intervention verbal cueing   Patient Response/Progress tolerated well   Education   Learner/Method No Barriers to Learning   Plan   Home program see [facility]   Plan for next session progress as tolerated         DISCHARGE  Reason for Discharge: pt is progressing towards goals    Equipment Issued:     Discharge Plan: Patient to continue home program.    Referring Provider:  Jason Ham

## 2025-03-27 ENCOUNTER — TRANSFERRED RECORDS (OUTPATIENT)
Dept: HEALTH INFORMATION MANAGEMENT | Facility: CLINIC | Age: 75
End: 2025-03-27
Payer: COMMERCIAL

## 2025-04-07 PROBLEM — R10.2 PELVIC AND PERINEAL PAIN: Status: RESOLVED | Noted: 2025-01-23 | Resolved: 2025-04-07

## 2025-04-10 SDOH — HEALTH STABILITY: PHYSICAL HEALTH: ON AVERAGE, HOW MANY DAYS PER WEEK DO YOU ENGAGE IN MODERATE TO STRENUOUS EXERCISE (LIKE A BRISK WALK)?: 3 DAYS

## 2025-04-10 SDOH — HEALTH STABILITY: PHYSICAL HEALTH: ON AVERAGE, HOW MANY MINUTES DO YOU ENGAGE IN EXERCISE AT THIS LEVEL?: 40 MIN

## 2025-04-10 ASSESSMENT — SOCIAL DETERMINANTS OF HEALTH (SDOH): HOW OFTEN DO YOU GET TOGETHER WITH FRIENDS OR RELATIVES?: TWICE A WEEK

## 2025-04-15 ENCOUNTER — OFFICE VISIT (OUTPATIENT)
Dept: INTERNAL MEDICINE | Facility: CLINIC | Age: 75
End: 2025-04-15
Payer: COMMERCIAL

## 2025-04-15 ENCOUNTER — TRANSFERRED RECORDS (OUTPATIENT)
Dept: HEALTH INFORMATION MANAGEMENT | Facility: CLINIC | Age: 75
End: 2025-04-15

## 2025-04-15 VITALS
DIASTOLIC BLOOD PRESSURE: 76 MMHG | SYSTOLIC BLOOD PRESSURE: 136 MMHG | RESPIRATION RATE: 16 BRPM | TEMPERATURE: 98.9 F | OXYGEN SATURATION: 95 % | HEIGHT: 69 IN | HEART RATE: 93 BPM | WEIGHT: 226.7 LBS | BODY MASS INDEX: 33.58 KG/M2

## 2025-04-15 DIAGNOSIS — Z00.00 ROUTINE GENERAL MEDICAL EXAMINATION AT A HEALTH CARE FACILITY: Primary | ICD-10-CM

## 2025-04-15 DIAGNOSIS — E66.811 CLASS 1 OBESITY DUE TO EXCESS CALORIES WITHOUT SERIOUS COMORBIDITY WITH BODY MASS INDEX (BMI) OF 31.0 TO 31.9 IN ADULT: ICD-10-CM

## 2025-04-15 DIAGNOSIS — E78.2 MIXED DYSLIPIDEMIA: ICD-10-CM

## 2025-04-15 DIAGNOSIS — E66.09 CLASS 1 OBESITY DUE TO EXCESS CALORIES WITHOUT SERIOUS COMORBIDITY WITH BODY MASS INDEX (BMI) OF 31.0 TO 31.9 IN ADULT: ICD-10-CM

## 2025-04-15 DIAGNOSIS — E03.9 HYPOTHYROIDISM, UNSPECIFIED TYPE: ICD-10-CM

## 2025-04-15 PROCEDURE — 3075F SYST BP GE 130 - 139MM HG: CPT | Performed by: INTERNAL MEDICINE

## 2025-04-15 PROCEDURE — 99214 OFFICE O/P EST MOD 30 MIN: CPT | Mod: 25 | Performed by: INTERNAL MEDICINE

## 2025-04-15 PROCEDURE — G0439 PPPS, SUBSEQ VISIT: HCPCS | Performed by: INTERNAL MEDICINE

## 2025-04-15 PROCEDURE — 3078F DIAST BP <80 MM HG: CPT | Performed by: INTERNAL MEDICINE

## 2025-04-15 RX ORDER — LEVOTHYROXINE SODIUM 50 UG/1
50 TABLET ORAL
Qty: 90 TABLET | Refills: 3 | Status: SHIPPED | OUTPATIENT
Start: 2025-04-15

## 2025-04-15 NOTE — PATIENT INSTRUCTIONS
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  Does not need to be a fasting blood test.    Kyle is committed to getting rid of the excess weight.  We talked about a intermittent fasting lifestyle which means that the main meal of the day is lunch, supper is a small meal, and then breakfast would be skipped, exercise instead.  Problem is, Kyle loves to eat breakfast.    Numbness ball right foot, heel and ball of left foot  Numbness in feet,  Left ankle surgery July 2022, likely peripheral nerve entrapment from mechanical overuse  Kyle traces the symptoms to after he had the left ankle fracture and surgery.  Initially the numbness was in his left foot, then he started getting in his right foot.  I am pretty sure the trouble is nerve entrapment, tarsal tunnel syndrome related to chronic stress and suboptimal gait mechanics in both feet and ankles.     The answer for this is several fold.  Kyle is correct that he should avoid going barefoot.  He should also always wear supportive footwear.  Losing weight will help.  Choosing his exercises to reduce weightbearing and especially impact on his feet would be favorable.  Thus, although walking is good exercise, that should not be the mainstay of his fitness program.  Instead he should rely on things like floor exercises, isometric strength training, and using nonfoot weightbearing equipment such as exercise bikes, rowing machines, etc.    Subclinical hypothyroidism  Restart small dose of levothyroxine 50 mcg daily-- check TSH 3 months    4-  TSH 4.47 (H) 0.30 - 4.20 uIU/mL     Free T4 0.88 (L) 0.90 - 1.70 ng/dL     Osteoarthritis multi-site, stiffness probably on that basis  Although I believe lisset aching and stiffness is from multisite osteoarthritis, I will include in  his lab work some testing for potential inflammatory disorders such as polymyalgia rheumatica.  Therefore lets include a sedimentation rate and C-reactive protein, and also a CK muscle enzyme measurement.     Extensive degenerative spondylosis in the lumbar and thoracic spine, and levoscoliosis lumbar centered at L2-3  Chronic low back pain and scoliosis, does home exercises    Had lumbar RFA performed around October 26, 2022--he reported procedure was successful in terms of pain relief     The most important thing is for Kyle to focus on our weight control, core muscle strengthening, and flexibility     10-  Impression:  1. Thoracic spine: Multilevel thoracic spondylosis greatest at T11-12 where there is moderate spinal canal stenosis and cord compression and mild bilateral neural foraminal stenosis. No myelopathic cord signal.     2. Lumbar spine: Multilevel lumbar spondylosis greatest at L2-3 with moderate right neural foraminal stenosis. No high-grade spinal canal stenosis at any level. Levoscoliosis centered at L2-3.      Recovered from left distal tibial fracture July 12, 2022, which required open reduction internal fixation surgery with placement of 6 or 7 screws, which was able to be done despite his being only 4 months after his heart attack and coronary stent     Nice recovered from ST elevation inferior myocardial infarction that was followed by emergency coronary angioplasty and stenting of his right coronary artery, performed March 4, 2022 at [facility].  His presenting symptom was indigestion, which would have been quite characteristic for inferior MI.  Coronary angiography and revascularization was performed via a right radial artery approach     Coronary artery disease, most recent intervention for right coronary artery occlusion and stents placed March 4, 2022 after inferior ST elevation MI  Previous cardiac history of  frequent PVCs, prior myocardial infarction and coronary  interventions in 1998, 2008, and then July 2016 with drug-eluting stents placed into the left anterior descending artery and mid circumflex  Dual antiplatelet therapy with aspirin and clopidogrel     Admitted to [facility] on March 4th, 2022 inferior STEMI.   S/p 2 SMITHA dRCA occlusion; residual diffuse ISR or proximal and mRCA stents.  atorvastatin (LIPITOR) 80 MG tablet   metoprolol succinate ER (TOPROL XL) 25 MG 24 hr tablet     Systolic murmur best heard right upper sternal border in the aortic area, probably represents aortic sclerosis, doubt stenosis.  Carotid arteries are quiet     Swallowing difficulties for dry scratchy texture food such as pretzels and chips, and that may have been the cause of a vasovagal faint after which caused him to fall and triggered the tibial fracture     Hypertension in the context of coronary disease, blood pressures running higher over the last year  Lisinopril 20 mg once a day (reduced from twice a day)  Metoprolol succinate 25 mg once a day  Amlodipine 5 mg once a day  Hydrochlorothiazide 25 mg once a day     BP Readings from Last 6 Encounters:   04/15/25 136/76   01/02/25 (!) 164/105   05/09/24 125/74   04/15/24 (!) 163/78   03/26/24 128/80   10/13/23 112/79     Advanced osteoarthritis right knee, chronic torn meniscus right knee, which demonstrated on MRI in December 2014, not too symptomatic, but he should continue to work with [facility] on this issue, and also pursue quadriceps muscle strengthening  The 2014 knee MRI demonstrated an extensive complex tear of the posterior horn and body of the medial meniscus, also degenerative arthritis in the medial and patellofemoral compartments with grade IV chondromalacia, also low-grade sprain or inflammation of the medial collateral ligament.  Even though this was 6 years ago, his knee is holding up reasonably well.  He did stress it yesterday when he was moving a panel.  But today says his knee feels pretty good.  There is a mild to  moderate sized effusion on physical exam.  I think he could continue with conservative management strategy, but should at [facility] about learning some home exercises to strengthen his quadriceps, which will help protect his knee, and delay the need for surgery.     Possible carpal tunnel syndrome right hand, mild  He told me he experiences a little bit of tingling in the hand.   I would suggest he try a carpal tunnel Velcro splint, and especially in bed.     History of bladder cancer April 2014, working with Dr. Pedro Guerrero at [facility], getting yearly cystoscopies, has also had intermittent tenderness right testicle.       Flatulence, which very well could be from indigestible vegetables sugars (alpha glycosides), for which I suggested that he try over-the-counter enzyme supplement, Beano or similar.  He noticed more gas after he switched to a predominantly vegetable diet.     Obesity with body mass index 32, likely contributing to blood pressure, would like to see him lose 20 pounds this year.    I like to see him get down around 180 pounds by the end of the year.  I reminded him of the importance of eating slowly, controlling portion size, and identifying problem foods to curtail or eliminate, especially starches, sweets, fried foods.  He told me that alcohol consumption is approximately once a week, so that does not sound like a big source of calories for him.     Wt Readings from Last 5 Encounters:   04/15/25 102.8 kg (226 lb 11.2 oz)   01/02/25 95.7 kg (211 lb)   05/09/24 95.7 kg (211 lb)   04/15/24 98.4 kg (217 lb)   03/26/24 98.4 kg (217 lb)     4-  Hemoglobin A1C 5.6 0.0 - 5.6 %     Hyperlipidemia in the context of coronary disease, on high dose atorvastatin    Collection Time: 04/11/25  7:38 AM   Result Value Ref Range    Cholesterol 147 <200 mg/dL    Triglycerides 112 <150 mg/dL    Direct Measure HDL 51 >=40 mg/dL    LDL Cholesterol Calculated 74 <100 mg/dL    Non HDL Cholesterol 96  <130 mg/dL     Droopy eyelids (dermatochalasis) for which he should consult with his ophthalmologist.      Erectile dysfunction, okay to increase sildenafil up to 100 mg dose     Gastroesophageal reflux, not too bothersome.       History of trochanteric bursitis both hips, in remission      Sensitivity of the right nipple, but I do not see any visible abnormality, nor do I palpate any abnormality. Kyle first noticed this symptom in approximately 2 months ago around February 2023.  The nipple has been very sensitive to touch, or when this skin gets jostled around, for example when he is walking or jogging.     On examination April 2023, the nipple appears normal to my eye, and when I palpate I appreciate normal glandular tissue, symmetrical with the left side.     Darkly pigmented papules on both sides of his neck, saw [facility]    Screening PSA  4-  Prostate Specific Antigen Screen 0.34 0.00 - 6.50 ng/mL     5 mm tubular adenoma polyp removed January 15, 2025.  I told Kyle that perhaps he does not need to worry about any more screening/surveillance colonoscopy.  But if he were to develop the symptoms down the road pointing to a problem in the colon, for example bleeding or obstruction, that would be a scenario to get a diagnostic colonoscopy   10 mm colon polyp removed December 14, 2021  History of tubular adenoma colon polyp 6 mm sigmoid June 2016     Carpal tunnel syndrome and right thumb osteoarthritis, not too bothersome these days      Immunization History   Administered Date(s) Administered    COVID-19 12+ (Pfizer) 09/24/2024    COVID-19 Bivalent 12+ (Pfizer) 09/13/2022    COVID-19 MONOVALENT 12+ (Pfizer) 02/01/2021, 02/22/2021, 09/30/2021    COVID-19 Monovalent 12+ (Pfizer 2022) 04/20/2022    DT (PEDS <7y) 01/01/1995, 05/01/2005    Flu 65+ (Fluad) 10/17/2022    Flu, Unspecified 11/14/2007, 10/10/2008, 09/22/2010, 11/02/2011, 12/17/2012    Influenza (H1N1) 12/15/2009    Influenza (High  Dose) Trivalent,PF (Fluzone) 10/30/2015, 11/23/2016, 10/18/2017, 09/26/2018, 11/05/2019    Influenza (IIV3) PF 11/25/2014    Influenza Vaccine 65+ (Fluzone HD) 10/22/2020, 10/16/2021, 10/10/2023    Influenza Vaccine, 6+MO IM (QUADRIVALENT W/PRESERVATIVES) 12/15/2009, 12/06/2013, 11/25/2014    Pneumo Conj 13-V (2010&after) 05/23/2016    Pneumococcal 23 valent 11/25/2014    RSV Vaccine (Arexvy) 10/13/2023    TDAP (Adacel,Boostrix) 04/19/2011, 07/14/2022    Td,adult,historic,unspecified 05/01/2005    Zoster recombinant adjuvanted (Shingrix) 12/28/2021, 05/15/2022    Zoster vaccine, live 12/06/2013, 12/28/2021

## 2025-04-15 NOTE — PROGRESS NOTES
Preventive Care Visit  [facility]  JORDAN SOLOMON MD, Internal Medicine  Apr 15, 2025    Subjective   Kyle is a 74 year old, presenting for the following:  Physical (Numbness in the bottom of both feet and weight concerns)        4/15/2025     1:57 PM   Additional Questions   Roomed by lázaro MANCERA    Counseling  Appropriate preventive services were addressed with this patient via screening, questionnaire, or discussion as appropriate for fall prevention, nutrition, physical activity, Tobacco-use cessation, social engagement, weight loss and cognition.  Checklist reviewing preventive services available has been given to the patient.  Reviewed patient's diet, addressing concerns and/or questions.   If at risk for lack of exercise, the patient was provided with information to increase physical activity for the benefit of well-being.   If at risk for social isolation, the patient was provided with information about the benefit of social connection.   The patient was reminded to see the dentist every 6 months.   If at risk for psychosocial distress, the patient was provided with information to reduce risk.     Advance Care Planning  Patient does not have a Health Care Directive: Discussed advance care planning with patient; however, patient declined at this time.        4/10/2025   General Health   How would you rate your overall physical health? (!) FAIR   Feel stress (tense, anxious, or unable to sleep) Only a little   (!) STRESS CONCERN      4/10/2025   Nutrition   Diet: Vegetarian/vegan         4/10/2025   Exercise   Days per week of moderate/strenous exercise 3 days   Average minutes spent exercising at this level 40 min         4/10/2025   Social Factors   Frequency of gathering with friends or relatives Twice a week   Worry food won't last until get money to buy more No   Food not last or not have enough money for food? No   Do you have housing? (Housing is defined as stable permanent  housing and does not include staying ouside in a car, in a tent, in an abandoned building, in an overnight shelter, or couch-surfing.) Yes   Are you worried about losing your housing? No   Lack of transportation? No   Unable to get utilities (heat,electricity)? No         4/10/2025   Fall Risk   Fallen 2 or more times in the past year? No   Trouble with walking or balance? Yes         4/10/2025   Activities of Daily Living- Home Safety   Needs help with the following daily activites None of the above   Safety concerns in the home None of the above         4/10/2025   Dental   Dentist two times every year? Yes         4/10/2025   Hearing Screening   Hearing concerns? (!) I NEED TO ASK PEOPLE TO SPEAK UP OR REPEAT THEMSELVES.    (!) IT'S HARD TO FOLLOW A CONVERSATION IN A NOISY RESTAURANT OR CROWDED ROOM.    (!) TROUBLE UNDERSTANDING SOFT OR WHISPERED SPEECH.       Multiple values from one day are sorted in reverse-chronological order         4/10/2025   Driving Risk Screening   Patient/family members have concerns about driving No         4/10/2025   General Alertness/Fatigue Screening   Have you been more tired than usual lately? (!) YES         4/10/2025   Urinary Incontinence Screening   Bothered by leaking urine in past 6 months No           3/19/2024   TB Screening   Were you born outside of the [place]? No           4/10/2025   Substance Use   Alcohol more than 3/day or more than 7/wk No   Do you have a current opioid prescription? No   How severe/bad is pain from 1 to 10? 4/10   Do you use any other substances recreationally? No     Social History     Tobacco Use    Smoking status: Former     Types: Cigars     Quit date:      Years since quittin.3     Passive exposure: Never    Smokeless tobacco: Never    Tobacco comments:     occasional cigar   Vaping Use    Vaping status: Never Used   Substance Use Topics    Alcohol use: Yes     Comment: 2 drinks per month    Drug use: No     Current providers sharing in  "care for this patient include:  Patient Care Team:  Omega Wilkerson MD as PCP - General (Internal Medicine)  Omega Wilkerson MD as Assigned PCP  Heydi Wilkes MD as Assigned Heart and Vascular Provider  Heydi Wilkes MD as MD (Cardiovascular Disease)  Jhony Soto MD as MD (Neurological Surgery)  Jhony Soto MD as Assigned Neuroscience Provider  Jason Ham MD as MD (Urology)  Jason Ham MD as Assigned Surgical Provider    The following health maintenance items are reviewed in Epic and correct as of today:  Health Maintenance   Topic Date Due    ANNUAL REVIEW OF HM ORDERS  07/14/2023    COVID-19 Vaccine (7 - 2024-25 season) 03/24/2025    MEDICARE ANNUAL WELLNESS VISIT  03/26/2025    BMP  04/11/2026    LIPID  04/11/2026    FALL RISK ASSESSMENT  04/15/2026    DIABETES SCREENING  04/11/2028    ADVANCE CARE PLANNING  03/26/2029    DTAP/TDAP/TD IMMUNIZATION (3 - Td or Tdap) 07/14/2032    HEPATITIS C SCREENING  Completed    PHQ-2 (once per calendar year)  Completed    INFLUENZA VACCINE  Completed    Pneumococcal Vaccine: 50+ Years  Completed    ZOSTER IMMUNIZATION  Completed    RSV VACCINE  Completed    HPV IMMUNIZATION  Aged Out    MENINGITIS IMMUNIZATION  Aged Out    TSH W/FREE T4 REFLEX  Discontinued    COLORECTAL CANCER SCREENING  Discontinued     Review of Systems  Constitutional, HEENT, cardiovascular, pulmonary, gi and gu systems are negative, except as otherwise noted.     Objective    Exam  /76   Pulse 93   Temp 98.9  F (37.2  C) (Oral)   Resp 16   Ht 1.746 m (5' 8.75\")   Wt 102.8 kg (226 lb 11.2 oz)   SpO2 95%   BMI 33.72 kg/m     Estimated body mass index is 33.72 kg/m  as calculated from the following:    Height as of this encounter: 1.746 m (5' 8.75\").    Weight as of this encounter: 102.8 kg (226 lb 11.2 oz).    Physical Exam    General: Alert, in no distress  Skin: Scattered benign-appearing nevi, (currently working with his " dermatologist)  Eyes/nose/throat: Eyes without scleral icterus, eye movements normal, pupils equal and reactive, oropharynx clear, ears with normal TM's  MSK: Neck with good ROM  Lymphatic: Neck without adenopathy or masses  Endocrine: Thyroid with no nodules to palpation  Pulm: Lungs clear to auscultation bilaterally  Cardiac: Heart with regular rate and rhythm, 2/6 systolic murmur best heard right upper sternal border in the aortic area, probably represents aortic sclerosis, doubt stenosis.  Carotid arteries are quiet    GI: Abdomen obese, soft, nontender. No palpable enlargement of liver or spleen  MSK: Extremities no tenderness or edema  Neuro: Moves all extremities, without focal weakness  Psych: Alert, normal mental status. 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  Does not need to be a fasting blood test.    Kyle is committed to getting rid of the excess weight.  We talked about a intermittent fasting lifestyle which means that the main meal of the day is lunch, supper is a small meal, and then breakfast would be skipped, exercise instead.  Problem is, Kyle loves to eat breakfast.    Numbness ball right foot, heel and ball of left foot  Numbness in feet,  Left ankle surgery July 2022, likely peripheral nerve entrapment from mechanical overuse  Kyle traces the symptoms to after he had the left ankle fracture and surgery.  Initially the numbness was in his left foot, then he started getting in his right foot.  I am pretty sure the trouble is nerve entrapment,  tarsal tunnel syndrome related to chronic stress and suboptimal gait mechanics in both feet and ankles.     The answer for this is several fold.  Kyle is correct that he should avoid going barefoot.  He should also always wear supportive footwear.  Losing weight will help.  Choosing his exercises to reduce weightbearing and especially impact on his feet would be favorable.  Thus, although walking is good exercise, that should not be the mainstay of his fitness program.  Instead he should rely on things like floor exercises, isometric strength training, and using nonfoot weightbearing equipment such as exercise bikes, rowing machines, etc.    Subclinical hypothyroidism  Restart small dose of levothyroxine 50 mcg daily-- check TSH 3 months    4-  TSH 4.47 (H) 0.30 - 4.20 uIU/mL     Free T4 0.88 (L) 0.90 - 1.70 ng/dL     Osteoarthritis multi-site, stiffness probably on that basis  Although I believe lisset aching and stiffness is from multisite osteoarthritis, I will include in his lab work some testing for potential inflammatory disorders such as polymyalgia rheumatica.  Therefore lets include a sedimentation rate and C-reactive protein, and also a CK muscle enzyme measurement.     Extensive degenerative spondylosis in the lumbar and thoracic spine, and levoscoliosis lumbar centered at L2-3  Chronic low back pain and scoliosis, does home exercises    Had lumbar RFA performed around October 26, 2022--he reported procedure was successful in terms of pain relief     The most important thing is for Kyle to focus on our weight control, core muscle strengthening, and flexibility     10-  Impression:  1. Thoracic spine: Multilevel thoracic spondylosis greatest at T11-12 where there is moderate spinal canal stenosis and cord compression and mild bilateral neural foraminal stenosis. No myelopathic cord signal.     2. Lumbar spine: Multilevel lumbar spondylosis greatest at L2-3 with moderate right neural foraminal  stenosis. No high-grade spinal canal stenosis at any level. Levoscoliosis centered at L2-3.      Recovered from left distal tibial fracture July 12, 2022, which required open reduction internal fixation surgery with placement of 6 or 7 screws, which was able to be done despite his being only 4 months after his heart attack and coronary stent     Nice recovered from ST elevation inferior myocardial infarction that was followed by emergency coronary angioplasty and stenting of his right coronary artery, performed March 4, 2022 at [facility].  His presenting symptom was indigestion, which would have been quite characteristic for inferior MI.  Coronary angiography and revascularization was performed via a right radial artery approach     Coronary artery disease, most recent intervention for right coronary artery occlusion and stents placed March 4, 2022 after inferior ST elevation MI  Previous cardiac history of  frequent PVCs, prior myocardial infarction and coronary interventions in 1998, 2008, and then July 2016 with drug-eluting stents placed into the left anterior descending artery and mid circumflex  Dual antiplatelet therapy with aspirin and clopidogrel     Admitted to [facility] on March 4th, 2022 inferior STEMI.   S/p 2 SMITHA dRCA occlusion; residual diffuse ISR or proximal and mRCA stents.  atorvastatin (LIPITOR) 80 MG tablet   metoprolol succinate ER (TOPROL XL) 25 MG 24 hr tablet     Systolic murmur best heard right upper sternal border in the aortic area, probably represents aortic sclerosis, doubt stenosis.  Carotid arteries are quiet     Swallowing difficulties for dry scratchy texture food such as pretzels and chips, and that may have been the cause of a vasovagal faint after which caused him to fall and triggered the tibial fracture     Hypertension in the context of coronary disease, blood pressures running higher over the last year  Lisinopril 20 mg once a day (reduced from twice a  day)  Metoprolol succinate 25 mg once a day  Amlodipine 5 mg once a day  Hydrochlorothiazide 25 mg once a day     BP Readings from Last 6 Encounters:   04/15/25 136/76   01/02/25 (!) 164/105   05/09/24 125/74   04/15/24 (!) 163/78   03/26/24 128/80   10/13/23 112/79     Advanced osteoarthritis right knee, chronic torn meniscus right knee, which demonstrated on MRI in December 2014, not too symptomatic, but he should continue to work with [facility] on this issue, and also pursue quadriceps muscle strengthening  The 2014 knee MRI demonstrated an extensive complex tear of the posterior horn and body of the medial meniscus, also degenerative arthritis in the medial and patellofemoral compartments with grade IV chondromalacia, also low-grade sprain or inflammation of the medial collateral ligament.  Even though this was 6 years ago, his knee is holding up reasonably well.  He did stress it yesterday when he was moving a panel.  But today says his knee feels pretty good.  There is a mild to moderate sized effusion on physical exam.  I think he could continue with conservative management strategy, but should at [facility] about learning some home exercises to strengthen his quadriceps, which will help protect his knee, and delay the need for surgery.     Possible carpal tunnel syndrome right hand, mild  He told me he experiences a little bit of tingling in the hand.   I would suggest he try a carpal tunnel Velcro splint, and especially in bed.     History of bladder cancer April 2014, working with Dr. Pedro Guerrero at [facility], getting yearly cystoscopies, has also had intermittent tenderness right testicle.       Flatulence, which very well could be from indigestible vegetables sugars (alpha glycosides), for which I suggested that he try over-the-counter enzyme supplement, Beano or similar.  He noticed more gas after he switched to a predominantly vegetable diet.     Obesity with body mass index 32, likely  contributing to blood pressure, would like to see him lose 20 pounds this year.    I like to see him get down around 180 pounds by the end of the year.  I reminded him of the importance of eating slowly, controlling portion size, and identifying problem foods to curtail or eliminate, especially starches, sweets, fried foods.  He told me that alcohol consumption is approximately once a week, so that does not sound like a big source of calories for him.     Wt Readings from Last 5 Encounters:   04/15/25 102.8 kg (226 lb 11.2 oz)   01/02/25 95.7 kg (211 lb)   05/09/24 95.7 kg (211 lb)   04/15/24 98.4 kg (217 lb)   03/26/24 98.4 kg (217 lb)     4-  Hemoglobin A1C 5.6 0.0 - 5.6 %     Hyperlipidemia in the context of coronary disease, on high dose atorvastatin    Collection Time: 04/11/25  7:38 AM   Result Value Ref Range    Cholesterol 147 <200 mg/dL    Triglycerides 112 <150 mg/dL    Direct Measure HDL 51 >=40 mg/dL    LDL Cholesterol Calculated 74 <100 mg/dL    Non HDL Cholesterol 96 <130 mg/dL     Droopy eyelids (dermatochalasis) for which he should consult with his ophthalmologist.      Erectile dysfunction, okay to increase sildenafil up to 100 mg dose     Gastroesophageal reflux, not too bothersome.       History of trochanteric bursitis both hips, in remission      Sensitivity of the right nipple, but I do not see any visible abnormality, nor do I palpate any abnormality. Kyle first noticed this symptom in approximately 2 months ago around February 2023.  The nipple has been very sensitive to touch, or when this skin gets jostled around, for example when he is walking or jogging.     On examination April 2023, the nipple appears normal to my eye, and when I palpate I appreciate normal glandular tissue, symmetrical with the left side.     Darkly pigmented papules on both sides of his neck, saw [facility]    Screening PSA  4-  Prostate Specific Antigen Screen 0.34 0.00 - 6.50 ng/mL     5  mm tubular adenoma polyp removed January 15, 2025.  I told Kyle that perhaps he does not need to worry about any more screening/surveillance colonoscopy.  But if he were to develop the symptoms down the road pointing to a problem in the colon, for example bleeding or obstruction, that would be a scenario to get a diagnostic colonoscopy   10 mm colon polyp removed December 14, 2021  History of tubular adenoma colon polyp 6 mm sigmoid June 2016     Carpal tunnel syndrome and right thumb osteoarthritis, not too bothersome these days      Immunization History   Administered Date(s) Administered    COVID-19 12+ (Pfizer) 09/24/2024    COVID-19 Bivalent 12+ (Pfizer) 09/13/2022    COVID-19 MONOVALENT 12+ (Pfizer) 02/01/2021, 02/22/2021, 09/30/2021    COVID-19 Monovalent 12+ (Pfizer 2022) 04/20/2022    DT (PEDS <7y) 01/01/1995, 05/01/2005    Flu 65+ (Fluad) 10/17/2022    Flu, Unspecified 11/14/2007, 10/10/2008, 09/22/2010, 11/02/2011, 12/17/2012    Influenza (H1N1) 12/15/2009    Influenza (High Dose) Trivalent,PF (Fluzone) 10/30/2015, 11/23/2016, 10/18/2017, 09/26/2018, 11/05/2019    Influenza (IIV3) PF 11/25/2014    Influenza Vaccine 65+ (Fluzone HD) 10/22/2020, 10/16/2021, 10/10/2023    Influenza Vaccine, 6+MO IM (QUADRIVALENT W/PRESERVATIVES) 12/15/2009, 12/06/2013, 11/25/2014    Pneumo Conj 13-V (2010&after) 05/23/2016    Pneumococcal 23 valent 11/25/2014    RSV Vaccine (Arexvy) 10/13/2023    TDAP (Adacel,Boostrix) 04/19/2011, 07/14/2022    Td,adult,historic,unspecified 05/01/2005    Zoster recombinant adjuvanted (Shingrix) 12/28/2021, 05/15/2022    Zoster vaccine, live 12/06/2013, 12/28/2021       Signed Electronically by: JORDAN SOLOMON MD

## 2025-04-21 ENCOUNTER — TRANSFERRED RECORDS (OUTPATIENT)
Dept: HEALTH INFORMATION MANAGEMENT | Facility: CLINIC | Age: 75
End: 2025-04-21
Payer: COMMERCIAL

## 2025-04-28 ENCOUNTER — TRANSFERRED RECORDS (OUTPATIENT)
Dept: HEALTH INFORMATION MANAGEMENT | Facility: CLINIC | Age: 75
End: 2025-04-28
Payer: COMMERCIAL

## 2025-05-19 ENCOUNTER — OFFICE VISIT (OUTPATIENT)
Dept: CARDIOLOGY | Facility: CLINIC | Age: 75
End: 2025-05-19
Attending: INTERNAL MEDICINE
Payer: COMMERCIAL

## 2025-05-19 VITALS
RESPIRATION RATE: 12 BRPM | HEART RATE: 84 BPM | WEIGHT: 223.8 LBS | HEIGHT: 69 IN | SYSTOLIC BLOOD PRESSURE: 112 MMHG | DIASTOLIC BLOOD PRESSURE: 64 MMHG | BODY MASS INDEX: 33.15 KG/M2

## 2025-05-19 DIAGNOSIS — R06.09 DOE (DYSPNEA ON EXERTION): Primary | ICD-10-CM

## 2025-05-19 DIAGNOSIS — I10 HYPERTENSION, UNSPECIFIED TYPE: ICD-10-CM

## 2025-05-19 DIAGNOSIS — E78.5 DYSLIPIDEMIA: ICD-10-CM

## 2025-05-19 DIAGNOSIS — I49.3 PVC'S (PREMATURE VENTRICULAR CONTRACTIONS): ICD-10-CM

## 2025-05-19 DIAGNOSIS — I25.10 CORONARY ARTERY DISEASE INVOLVING NATIVE CORONARY ARTERY OF NATIVE HEART WITHOUT ANGINA PECTORIS: ICD-10-CM

## 2025-05-19 PROCEDURE — 3074F SYST BP LT 130 MM HG: CPT | Performed by: INTERNAL MEDICINE

## 2025-05-19 PROCEDURE — 3078F DIAST BP <80 MM HG: CPT | Performed by: INTERNAL MEDICINE

## 2025-05-19 PROCEDURE — G2211 COMPLEX E/M VISIT ADD ON: HCPCS | Performed by: INTERNAL MEDICINE

## 2025-05-19 PROCEDURE — 99214 OFFICE O/P EST MOD 30 MIN: CPT | Performed by: INTERNAL MEDICINE

## 2025-05-19 NOTE — LETTER
"5/19/2025    JORDAN SOLOMON MD  [address] [place] [address]    RE: Josafat ARVIZU Kimaniiwona       Dear Colleague,     I had the pleasure of seeing Josafat Madera in the [facility].         [facility] [address], [place] [address]   [email or web address]   OFFICE: [phone]            Impression and Plan     1. Coronary artery disease.  \"Kyle\" has known coronary disease having had prior myocardial infarction and percutaneous interventions in 1998 and 2008. On 12 August 2008 after the patient had presented with acute occlusion of the right coronary artery. He had 2 stents placed to the vessel.     \"Kyle\" underwent repeat angiography 14 July 2016 at which time he had PCI with stenting of mid left anterior descending coronary artery ([place] [employer] Synergy SMITHA [id number]) and PCI with stenting of mid circumflex coronary artery ([place] [employer] Synergy SMITHA 3.0x20mm).  See Cardiac Diagnostics section below for details.     \"Kyle\" underwent an exercise stress test 21 December 2018 that was favorable and revealed no evidence of ischemia and normal functional capacity.     Kyel underwent repeat angiography 4 March 2022 after presenting to [facility] with evidence of an unstable ischemic syndrome.  At that time he had successful PCI with stent deployment to distal right coronary artery (3.5 x 18 mm [place] prior to crux, and a second 4.0 x 16 mm Synergy SMITHA in mid-distal RCA, overlapping proximally with the old mid RCA stent).     Regadenoson MRI 25 October 2022 revealed no evidence of ischemia.     As noted below, he does report a subjective diminution in his exercise tolerance as well as associated dyspnea.  Has perhaps some slight chest discomfort on occasion but not a prominent feature of his symptom profile.  Do feel repeat ischemic workup is reasonable.  Due to some orthopedic issues, he would be unable to sufficiently exercise on treadmill " "and therefore none exercise imaging modality will be required.  Plan:  Regadenoson stress MRI.  Kyle underwent prior stress MRI and he states that he did not have any pronounced claustrophobia in the like.     2. PVCs.     Continue metoprolol.     3. Hypertension.  Blood pressure is reasonable in the office today at 112/64 mmHg.     4. Dyslipidemia.  Lipid profile 11 April 2025 revealed LDL 74 mg/dL and HDL 51 mg/dL.  Continue high intensity statin therapy.     Plan on follow-up in approximately one year.     35 minutes spent reviewing prior records (including documentation, laboratory studies, cardiac testing/imaging), interview with patient along with physical exam, planning, and subsequent documentation/crafting of note).     The longitudinal plan of care for coronary artery disease, PVCs, hypertension, & dyslipidemia was addressed during this visit.?Due to the added complexity in care, I will continue to support Josafat Madera in the subsequent management of this condition(s) and with the ongoing continuity of care of this condition(s)\".           History of Present Illness    Once again I would like to thank you again for asking me to participate in the care of your patient, Josafat Madera.  As you know, but to reiterate for my own records, Josafat Madera is a 74 year old male with known coronary artery disease. Specifically, \"Lola" has known coronary disease having had prior myocardial infarction and percutaneous interventions in 1998 and 2008. On 12 August 2008 after \"Lola" had presented with acute occlusion of the right coronary artery. He had 2 stents placed to the vessel.     \"Lola" underwent repeat angiography 14 July 2016 at which time he had PCI with stenting of mid left anterior descending coronary artery ([place] [employer] Synergy SMITHA [id number]) and PCI with stenting of mid circumflex coronary artery ([place] [employer] Synergy SMITHA 3.0x20mm).  See Cardiac Diagnostics section below for details.     Kyle" "underwent an exercise stress test 21 December 2018 that was favorable and revealed no evidence of ischemia and normal functional capacity.     Kyle underwent repeat angiography 4 March 2022 after presenting to [facility] with evidence of a unstable ischemic syndrome.  At that time, he had successful PCI with stent deployment to distal right coronary artery (3.5 x 18 mm [place] prior to crux, and a second 4.0 x 16 mm Synergy SMITHA in mid-distal RCA, overlapping proximally with the old mid RCA stent).     On interview today, \"Kyle\" reports that he feels he has had a diminution in his exercise tolerance and worsening shortness of breath with certain activities.  He has perhaps some slight chest discomfort though not a prominent feature of his symptom profile.    Further review of systems is otherwise negative/noncontributory (medical record and 13 point review of systems reviewed as well and pertinent positives noted).         Cardiac Diagnostics      Regadenoson MRI 25 October 2022:  Pharmacological Regadenoson stress cardiac MRI is negative for inducible myocardial ischemia.   Pharmacological stress ECG is negative for inducible myocardial ischemia.   Normal left ventricular size, wall thickness with normal ejection fraction. Moderate hypokinesis of the basal inferolateral wall segment. Left ventricular ejection fraction is 60-65% by visual estimation of real time images.   There is small area of transmural infarction involving the basal to mid inferolateral wall.   Non-transmural infarction of the basal inferoseptal and inferior wall segments of the left ventricle representing viable myocardium.      Normal right ventricular size and systolic function.    No evidence of significant valvular abnormalities.    Echocardiogram 5 March 2022 (performed at [facility]):  Normal left ventricular size and systolic performance with ejection fraction of 65%.  Mid basal to mid inferior hypokinesis.  Remaining segments " appear hyperdynamic.  Mild septal thickening.  No significant valvular heart disease.  Normal right ventricular size and systolic performance.  Atrial level visualized.    Echocardiogram for December 2020 (personally reviewed):  Normal left ventricular size and systolic performance with a visually estimated ejection fraction of 60-65%.   No significant valvular heart disease is identified on this study.   Normal right ventricular size and systolic performance.   Normal atrial dimensions.    Exercise stress test 13 October 2020 (formally reviewed by Dr. Maxx Russell ([place]) and also personally reviewed):  No ECG evidence of ischemia.  No chest pain symptoms with exercise.  Exercise-induced PVCs and short run of nonsustained VT was noted (5 beat).    Exercise stress test 19 December 2018:  No ECG evidence of ischemia.  No chest pain symptoms reported with exercise.  Normal functional capacity.    Nuclear stress perfusion study 1 July 2016 (pre-coronary angiogram which was performed 14 July 2016):  There is a large, severe perfusion defect involving the entire lateral wall on the left ventricle that redistributes on the rest images consistent with ischemia.  Normal left ventricular systolic performance with ejection fraction of 54% with lateral hypokinesis.    Nuclear stress perfusion study 28 October 2008:  Medium-sized area of non-transmural infarction in the inferior wall.  Normal left ventricular systolic performance with ejection fraction of 66% with very small area of basal inferior hypokinesis.    Coronary angiogram 4 March 2022 (performed at [facility]).  Left main coronary artery: No significant stenosis.  Left anterior descending coronary artery: Mid 30-40% stenosis.  Mid 50-60% stenosis.  Circumflex coronary artery: Large vessel with mild proximal disease.  Right coronary artery: Mild to moderate in-stent restenosis of proximal-mid RCA stent.  50% in-stent restenosis involving proximal right  coronary artery stent.  Acute total occlusion of distal right coronary artery just beyond edge of the mid right coronary artery stent.  90% stenosis in the distal right coronary artery prior to the crux.  Successful PCI with stent deployment to distal right coronary artery (3.5 x 18 mm [place] prior to crux, and a second 4.0 x 16 mm Synergy SMITHA in mid-distal RCA, overlapping proximally with the old mid RCA stent).    Coronary angiography 14 July 2016:  Left anterior descending coronary artery: Mid 75% stenosis.  Left circumflex coronary artery: Mid 90% stenosis with more severe distal stenosis.  Right coronary artery: 25% proximal stenosis and 40% distal stenosis.  Status post PCI with stenting of mid left anterior descending coronary artery ([place] [employer] Synergy SMITHA [id number]).  Status post PCI with stenting of mid circumflex coronary artery ([place] [employer] Synergy SMITHA 3.0x20mm).  Recommendations:  Consider medical therapy with possible PCI of distal LCX if symptoms not controlled. However small in caliber and likely too small for stent  Continue with prasugrel (Effient  ) for 12 months, but if necessary could discontinue earlier at 3 months. If tolerated would advocate 12 month course.    CT coronary angiography 22 May 2014:  Left main without significant stenosis.  Left anterior descending has a calcified plaque from distal left main and other calcified plaques and soft plaques in the mid left anterior descending which cause moderate stenosis of 50-60%. It appears to be non-obstructive in the left anterior descending.  Mild to moderate disease in the left circumflex.  There is mild disease in the proximal right coronary artery. The stent in the right coronary artery is patent. The distal right coronary artery stent is not able to be evaluated because of significant artifact.    Coronary angiography 12 August 2008 (performed at [facility]) :  Left main coronary artery: Mild  "irregularities.  Left anterior descending coronary artery: 70-80% stenosis at the branch point of the first major diagonal.  Circumflex artery with small obtuse marginal branch occlusion.  Right coronary artery: Vessel occluded in the proximal third portion. Culprit vessel for myocardial infarction.  PCI with stent placement (3.5×12 mm drug-eluting stent) to right coronary artery.    Holter monitor 22 October 2020:  Frequent PVCs.  Most of the PVCs are from a single site, but somecomplexity is noted including frequent couplets and some triplets.            Physical Examination       /64 (BP Location: Left arm, Patient Position: Sitting, Cuff Size: Adult Large)   Pulse 84   Resp 12   Ht 1.753 m (5' 9\")   Wt 101.5 kg (223 lb 12.8 oz)   BMI 33.05 kg/m          Wt Readings from Last 3 Encounters:   05/19/25 101.5 kg (223 lb 12.8 oz)   04/15/25 102.8 kg (226 lb 11.2 oz)   01/02/25 95.7 kg (211 lb)       The patient is alert and oriented times three. Sclerae are anicteric. Mucosal membranes are moist. Jugular venous pressure is normal. No significant adenopathy/thyromegally appreciated. Lungs are clear with good expansion. On cardiovascular exam, the patient has a regular S1 and S2. Abdomen is soft and non-tender. Extremities reveal no clubbing, cyanosis, or edema.         Family History/Social History/Risk Factors   Patient does not smoke.  Family history reviewed, and family history includes Cerebrovascular Disease in his mother; Crohn's Disease in his sister; Diabetes in his brother; Heart Disease in his father and mother.          Medical History  Surgical History Family History Social History   Past Medical History:   Diagnosis Date     [facility])     [facility]     Carpal tunnel syndrome      Cataract      Coronary artery disease      GERD (gastroesophageal reflux disease)      Hypercholesteremia      Hypertension      Lower back pain      Scoliosis      Past Surgical History: "   Procedure Laterality Date     ANGIOPLASTY       BLADDER SURGERY  2014    Polyp removal     HC REMOVAL OF TONSILS,<13 Y/O      Description: Tonsillectomy;  Recorded: 06/10/2008;     HERNIA REPAIR  2009    Bilateral     PHACOEMULSIFICATION CLEAR CORNEA WITH STANDARD INTRAOCULAR LENS IMPLANT Right 07/10/2014    Procedure: Right Cataract Extraction with Intraocular Lens Implantation;  Surgeon: Karl Nava MD;  Location: [facility];  Service:      PHACOEMULSIFICATION CLEAR CORNEA WITH STANDARD INTRAOCULAR LENS IMPLANT Left 2014    Procedure: CATARACT EXTRACTION WITH INTRAOCULAR LENS IMPLANTATION LEFT EYE;  Surgeon: Karl Nava MD;  Location: [facility];  Service:      right finger surgery Right     Extensor Tendon Repair     TONSILLECTOMY       Family History   Problem Relation Age of Onset     Cerebrovascular Disease Mother      Heart Disease Mother      Heart Disease Father      Crohn's Disease Sister      Diabetes Brother         Social History     Socioeconomic History     Marital status:      Spouse name: Not on file     Number of children: Not on file     Years of education: Not on file     Highest education level: Not on file   Occupational History     Not on file   Tobacco Use     Smoking status: Former     Types: Cigars     Quit date:      Years since quittin.4     Passive exposure: Never     Smokeless tobacco: Never     Tobacco comments:     occasional cigar   Vaping Use     Vaping status: Never Used   Substance and Sexual Activity     Alcohol use: Yes     Comment: 2 drinks per month     Drug use: No     Sexual activity: Yes     Partners: Female   Other Topics Concern     Not on file   Social History Narrative     Not on file     Social Drivers of Health     Financial Resource Strain: Low Risk  (4/10/2025)    Financial Resource Strain      Within the past 12 months, have you or your family members you live with been unable to get utilities (heat,  electricity) when it was really needed?: No   Food Insecurity: Low Risk  (4/10/2025)    Food Insecurity      Within the past 12 months, did you worry that your food would run out before you got money to buy more?: No      Within the past 12 months, did the food you bought just not last and you didn t have money to get more?: No   Transportation Needs: Low Risk  (4/10/2025)    Transportation Needs      Within the past 12 months, has lack of transportation kept you from medical appointments, getting your medicines, non-medical meetings or appointments, work, or from getting things that you need?: No   Physical Activity: Insufficiently Active (4/10/2025)    Exercise Vital Sign      Days of Exercise per Week: 3 days      Minutes of Exercise per Session: 40 min   Stress: No Stress Concern Present (4/10/2025)    [place] [employer] - Occupational Stress Questionnaire      Feeling of Stress : Only a little   Social Connections: Unknown (4/10/2025)    Social Connection and Isolation Panel [NHANES]      Frequency of Communication with Friends and Family: Not on file      Frequency of Social Gatherings with Friends and Family: Twice a week      Attends Gnosticism Services: Not on file      Active Member of Clubs or Organizations: Not on file      Attends Club or Organization Meetings: Not on file      Marital Status: Not on file   Interpersonal Safety: Low Risk  (1/23/2025)    Interpersonal Safety      Do you feel physically and emotionally safe where you currently live?: Yes      Within the past 12 months, have you been hit, slapped, kicked or otherwise physically hurt by someone?: No      Within the past 12 months, have you been humiliated or emotionally abused in other ways by your partner or ex-partner?: No   Housing Stability: Low Risk  (4/10/2025)    Housing Stability      Do you have housing? : Yes      Are you worried about losing your housing?: No           Medications  Allergies   Current  Outpatient Medications   Medication Sig Dispense Refill     amLODIPine (NORVASC) 5 MG tablet TAKE 1 TABLET(5 MG) BY MOUTH DAILY 90 tablet 0     aspirin 81 MG tablet [ASPIRIN 81 MG TABLET] Take 81 mg by mouth daily.       atorvastatin (LIPITOR) 80 MG tablet TAKE 1/2 TABLET(40 MG) BY MOUTH EVERY EVENING. 45 tablet 0     clopidogrel (PLAVIX) 75 MG tablet Take 1 tablet (75 mg) by mouth daily. 90 tablet 0     hydrochlorothiazide (HYDRODIURIL) 25 MG tablet TAKE 1 TABLET(25 MG) BY MOUTH DAILY. 90 tablet 0     levothyroxine (SYNTHROID/LEVOTHROID) 50 MCG tablet Take 1 tablet (50 mcg) by mouth every morning (before breakfast). 90 tablet 3     lisinopril (ZESTRIL) 20 MG tablet TAKE 1 TABLET BY MOUTH ONCE DAILY 180 tablet 1     metoprolol succinate ER (TOPROL XL) 25 MG 24 hr tablet TAKE 1 TABLET(25 MG) BY MOUTH DAILY 90 tablet 3     multivitamin with minerals (THERA-M) 9 mg iron-400 mcg Tab tablet [MULTIVITAMIN WITH MINERALS (THERA-M) 9 MG IRON-400 MCG TAB TABLET] Take 1 tablet by mouth daily.       omeprazole (PRILOSEC) 20 MG capsule [OMEPRAZOLE (PRILOSEC) 20 MG CAPSULE] Take 20 mg by mouth daily before breakfast.       sildenafil (VIAGRA) 50 MG tablet [SILDENAFIL (VIAGRA) 50 MG TABLET] Take 1 tablet (50 mg total) by mouth daily as needed for erectile dysfunction. 16 tablet 5     No Known Allergies       Lab Results    Chemistry/lipid CBC Cardiac Enzymes/BNP/TSH/INR   Recent Labs   Lab Test 04/11/25 0738   CHOL 147   HDL 51   LDL 74   TRIG 112     Recent Labs   Lab Test 04/11/25 0738 04/05/24 0720 11/29/22 0714   LDL 74 72 81     Recent Labs   Lab Test 04/11/25 0738      POTASSIUM 4.4   CHLORIDE 98   CO2 30*   GLC 93   BUN 13.1   CR 0.73   GFRESTIMATED >90   KASI 9.5     Recent Labs   Lab Test 04/11/25 0738 04/05/24 0720 11/29/22  0714   CR 0.73 0.78 0.71     Recent Labs   Lab Test 04/11/25  0738 04/05/24 0720 11/29/22 0714   A1C 5.6 5.4 5.6          Recent Labs   Lab Test 04/11/25  0738   WBC 8.5   HGB 14.4  "  HCT 41.6   MCV 93        Recent Labs   Lab Test 04/11/25  0738 04/05/24  0720 11/29/22  0714   HGB 14.4 14.6 15.2    No results for input(s): \"TROPONINI\" in the last 08622 hours.  No results for input(s): \"BNP\", \"NTBNPI\", \"NTBNP\" in the last 07237 hours.  Recent Labs   Lab Test 04/11/25  0738   TSH 4.47*     No results for input(s): \"INR\" in the last 79534 hours.       Medications  Allergies   Current Outpatient Medications   Medication Sig Dispense Refill     amLODIPine (NORVASC) 5 MG tablet TAKE 1 TABLET(5 MG) BY MOUTH DAILY 90 tablet 0     aspirin 81 MG tablet [ASPIRIN 81 MG TABLET] Take 81 mg by mouth daily.       atorvastatin (LIPITOR) 80 MG tablet TAKE 1/2 TABLET(40 MG) BY MOUTH EVERY EVENING. 45 tablet 0     clopidogrel (PLAVIX) 75 MG tablet Take 1 tablet (75 mg) by mouth daily. 90 tablet 0     hydrochlorothiazide (HYDRODIURIL) 25 MG tablet TAKE 1 TABLET(25 MG) BY MOUTH DAILY. 90 tablet 0     levothyroxine (SYNTHROID/LEVOTHROID) 50 MCG tablet Take 1 tablet (50 mcg) by mouth every morning (before breakfast). 90 tablet 3     lisinopril (ZESTRIL) 20 MG tablet TAKE 1 TABLET BY MOUTH ONCE DAILY 180 tablet 1     metoprolol succinate ER (TOPROL XL) 25 MG 24 hr tablet TAKE 1 TABLET(25 MG) BY MOUTH DAILY 90 tablet 3     multivitamin with minerals (THERA-M) 9 mg iron-400 mcg Tab tablet [MULTIVITAMIN WITH MINERALS (THERA-M) 9 MG IRON-400 MCG TAB TABLET] Take 1 tablet by mouth daily.       omeprazole (PRILOSEC) 20 MG capsule [OMEPRAZOLE (PRILOSEC) 20 MG CAPSULE] Take 20 mg by mouth daily before breakfast.       sildenafil (VIAGRA) 50 MG tablet [SILDENAFIL (VIAGRA) 50 MG TABLET] Take 1 tablet (50 mg total) by mouth daily as needed for erectile dysfunction. 16 tablet 5      No Known Allergies       Lab Results   Lab Results   Component Value Date     04/11/2025    CO2 30 04/11/2025    CO2 27 03/26/2021    BUN 13.1 04/11/2025    BUN 12 03/26/2021     Lab Results   Component Value Date    WBC 8.5 04/11/2025    " "HGB 14.4 04/11/2025    HCT 41.6 04/11/2025    MCV 93 04/11/2025     04/11/2025     Lab Results   Component Value Date    CHOL 147 04/11/2025    TRIG 112 04/11/2025    HDL 51 04/11/2025     No results found for: \"INR\"  No results found for: \"BNP\"  No results found for: \"CKTOTAL\", \"CKMB\", \"TROPONINI\"  Lab Results   Component Value Date    TSH 4.47 04/11/2025    TSH 2.01 03/26/2021                    Thank you for allowing me to participate in the care of your patient.      Sincerely,     Heydi Wilkes MD     [facility]  cc:   Heydi Wilkes MD  [address]  [place] [address]      "

## 2025-05-19 NOTE — PROGRESS NOTES
"       [facility] [address], [place] [address]   [email or web address]   OFFICE: [phone]            Impression and Plan     1. Coronary artery disease.  \"Kyle\" has known coronary disease having had prior myocardial infarction and percutaneous interventions in 1998 and 2008. On 12 August 2008 after the patient had presented with acute occlusion of the right coronary artery. He had 2 stents placed to the vessel.     \"Kyle\" underwent repeat angiography 14 July 2016 at which time he had PCI with stenting of mid left anterior descending coronary artery ([employer] Synergy SMITHA [id number]) and PCI with stenting of mid circumflex coronary artery ([employer] Synergy SMITHA 3.0x20mm).  See Cardiac Diagnostics section below for details.     \"Kyle\" underwent an exercise stress test 21 December 2018 that was favorable and revealed no evidence of ischemia and normal functional capacity.     Kyle underwent repeat angiography 4 March 2022 after presenting to [facility] with evidence of an unstable ischemic syndrome.  At that time he had successful PCI with stent deployment to distal right coronary artery (3.5 x 18 mm Rupert prior to crux, and a second 4.0 x 16 mm Synergy SMITHA in mid-distal RCA, overlapping proximally with the old mid RCA stent).     Regadenoson MRI 25 October 2022 revealed no evidence of ischemia.     As noted below, he does report a subjective diminution in his exercise tolerance as well as associated dyspnea.  Has perhaps some slight chest discomfort on occasion but not a prominent feature of his symptom profile.  Do feel repeat ischemic workup is reasonable.  Due to some orthopedic issues, he would be unable to sufficiently exercise on treadmill and therefore none exercise imaging modality will be required.  Plan:  Regadenoson stress MRI.  Kyle underwent prior stress MRI and he states that he did not have any pronounced claustrophobia in the " "like.     2. PVCs.     Continue metoprolol.     3. Hypertension.  Blood pressure is reasonable in the office today at 112/64 mmHg.     4. Dyslipidemia.  Lipid profile 11 April 2025 revealed LDL 74 mg/dL and HDL 51 mg/dL.  Continue high intensity statin therapy.     Plan on follow-up in approximately one year.     35 minutes spent reviewing prior records (including documentation, laboratory studies, cardiac testing/imaging), interview with patient along with physical exam, planning, and subsequent documentation/crafting of note).     The longitudinal plan of care for coronary artery disease, PVCs, hypertension, & dyslipidemia was addressed during this visit.?Due to the added complexity in care, I will continue to support Josafat Madera in the subsequent management of this condition(s) and with the ongoing continuity of care of this condition(s)\".           History of Present Illness    Once again I would like to thank you again for asking me to participate in the care of your patient, Josafat Madera.  As you know, but to reiterate for my own records, Josafat Madera is a 74 year old male with known coronary artery disease. Specifically, \"Lola" has known coronary disease having had prior myocardial infarction and percutaneous interventions in 1998 and 2008. On 12 August 2008 after \"Lola" had presented with acute occlusion of the right coronary artery. He had 2 stents placed to the vessel.     \"Lola" underwent repeat angiography 14 July 2016 at which time he had PCI with stenting of mid left anterior descending coronary artery ([employer] Synergy SMITHA [id number]) and PCI with stenting of mid circumflex coronary artery ([employer] Synergy SMITHA 3.0x20mm).  See Cardiac Diagnostics section below for details.     Kyle underwent an exercise stress test 21 December 2018 that was favorable and revealed no evidence of ischemia and normal functional capacity.     Kyle underwent repeat angiography 4 March 2022 after " "presenting to [facility] with evidence of a unstable ischemic syndrome.  At that time, he had successful PCI with stent deployment to distal right coronary artery (3.5 x 18 mm Samuel prior to crux, and a second 4.0 x 16 mm Synergy SMITHA in mid-distal RCA, overlapping proximally with the old mid RCA stent).     On interview today, \"Kyle\" reports that he feels he has had a diminution in his exercise tolerance and worsening shortness of breath with certain activities.  He has perhaps some slight chest discomfort though not a prominent feature of his symptom profile.    Further review of systems is otherwise negative/noncontributory (medical record and 13 point review of systems reviewed as well and pertinent positives noted).         Cardiac Diagnostics      Regadenoson MRI 25 October 2022:  Pharmacological Regadenoson stress cardiac MRI is negative for inducible myocardial ischemia.   Pharmacological stress ECG is negative for inducible myocardial ischemia.   Normal left ventricular size, wall thickness with normal ejection fraction. Moderate hypokinesis of the basal inferolateral wall segment. Left ventricular ejection fraction is 60-65% by visual estimation of real time images.   There is small area of transmural infarction involving the basal to mid inferolateral wall.   Non-transmural infarction of the basal inferoseptal and inferior wall segments of the left ventricle representing viable myocardium.      Normal right ventricular size and systolic function.    No evidence of significant valvular abnormalities.    Echocardiogram 5 March 2022 (performed at [facility]):  Normal left ventricular size and systolic performance with ejection fraction of 65%.  Mid basal to mid inferior hypokinesis.  Remaining segments appear hyperdynamic.  Mild septal thickening.  No significant valvular heart disease.  Normal right ventricular size and systolic performance.  Atrial level visualized.    Echocardiogram for December " 2020 (personally reviewed):  Normal left ventricular size and systolic performance with a visually estimated ejection fraction of 60-65%.   No significant valvular heart disease is identified on this study.   Normal right ventricular size and systolic performance.   Normal atrial dimensions.    Exercise stress test 13 October 2020 (formally reviewed by Dr. Maxx Russell ([place]) and also personally reviewed):  No ECG evidence of ischemia.  No chest pain symptoms with exercise.  Exercise-induced PVCs and short run of nonsustained VT was noted (5 beat).    Exercise stress test 19 December 2018:  No ECG evidence of ischemia.  No chest pain symptoms reported with exercise.  Normal functional capacity.    Nuclear stress perfusion study 1 July 2016 (pre-coronary angiogram which was performed 14 July 2016):  There is a large, severe perfusion defect involving the entire lateral wall on the left ventricle that redistributes on the rest images consistent with ischemia.  Normal left ventricular systolic performance with ejection fraction of 54% with lateral hypokinesis.    Nuclear stress perfusion study 28 October 2008:  Medium-sized area of non-transmural infarction in the inferior wall.  Normal left ventricular systolic performance with ejection fraction of 66% with very small area of basal inferior hypokinesis.    Coronary angiogram 4 March 2022 (performed at [facility]).  Left main coronary artery: No significant stenosis.  Left anterior descending coronary artery: Mid 30-40% stenosis.  Mid 50-60% stenosis.  Circumflex coronary artery: Large vessel with mild proximal disease.  Right coronary artery: Mild to moderate in-stent restenosis of proximal-mid RCA stent.  50% in-stent restenosis involving proximal right coronary artery stent.  Acute total occlusion of distal right coronary artery just beyond edge of the mid right coronary artery stent.  90% stenosis in the distal right coronary artery prior to the  crux.  Successful PCI with stent deployment to distal right coronary artery (3.5 x 18 mm Samuel prior to crux, and a second 4.0 x 16 mm Synergy SMITHA in mid-distal RCA, overlapping proximally with the old mid RCA stent).    Coronary angiography 14 July 2016:  Left anterior descending coronary artery: Mid 75% stenosis.  Left circumflex coronary artery: Mid 90% stenosis with more severe distal stenosis.  Right coronary artery: 25% proximal stenosis and 40% distal stenosis.  Status post PCI with stenting of mid left anterior descending coronary artery ([employer] Synergy SMITHA [id number]).  Status post PCI with stenting of mid circumflex coronary artery ([employer] Synergy SMITHA 3.0x20mm).  Recommendations:  Consider medical therapy with possible PCI of distal LCX if symptoms not controlled. However small in caliber and likely too small for stent  Continue with prasugrel (Effient  ) for 12 months, but if necessary could discontinue earlier at 3 months. If tolerated would advocate 12 month course.    CT coronary angiography 22 May 2014:  Left main without significant stenosis.  Left anterior descending has a calcified plaque from distal left main and other calcified plaques and soft plaques in the mid left anterior descending which cause moderate stenosis of 50-60%. It appears to be non-obstructive in the left anterior descending.  Mild to moderate disease in the left circumflex.  There is mild disease in the proximal right coronary artery. The stent in the right coronary artery is patent. The distal right coronary artery stent is not able to be evaluated because of significant artifact.    Coronary angiography 12 August 2008 (performed at [facility]) :  Left main coronary artery: Mild irregularities.  Left anterior descending coronary artery: 70-80% stenosis at the branch point of the first major diagonal.  Circumflex artery with small obtuse marginal branch occlusion.  Right coronary  "artery: Vessel occluded in the proximal third portion. Culprit vessel for myocardial infarction.  PCI with stent placement (3.5×12 mm drug-eluting stent) to right coronary artery.    Holter monitor 22 October 2020:  Frequent PVCs.  Most of the PVCs are from a single site, but somecomplexity is noted including frequent couplets and some triplets.            Physical Examination       /64 (BP Location: Left arm, Patient Position: Sitting, Cuff Size: Adult Large)   Pulse 84   Resp 12   Ht 1.753 m (5' 9\")   Wt 101.5 kg (223 lb 12.8 oz)   BMI 33.05 kg/m          Wt Readings from Last 3 Encounters:   05/19/25 101.5 kg (223 lb 12.8 oz)   04/15/25 102.8 kg (226 lb 11.2 oz)   01/02/25 95.7 kg (211 lb)       The patient is alert and oriented times three. Sclerae are anicteric. Mucosal membranes are moist. Jugular venous pressure is normal. No significant adenopathy/thyromegally appreciated. Lungs are clear with good expansion. On cardiovascular exam, the patient has a regular S1 and S2. Abdomen is soft and non-tender. Extremities reveal no clubbing, cyanosis, or edema.         Family History/Social History/Risk Factors   Patient does not smoke.  Family history reviewed, and family history includes Cerebrovascular Disease in his mother; Crohn's Disease in his sister; Diabetes in his brother; Heart Disease in his father and mother.          Medical History  Surgical History Family History Social History   Past Medical History:   Diagnosis Date    Bladder cancer (H)     see [facility] notes    Carpal tunnel syndrome     Cataract     Coronary artery disease     GERD (gastroesophageal reflux disease)     Hypercholesteremia     Hypertension     Lower back pain     Scoliosis      Past Surgical History:   Procedure Laterality Date    ANGIOPLASTY  1998/2008    BLADDER SURGERY  03/01/2014    Polyp removal    HC REMOVAL OF TONSILS,<13 Y/O      Description: Tonsillectomy;  Recorded: 06/10/2008;    HERNIA REPAIR  01/01/2009 "    Bilateral    PHACOEMULSIFICATION CLEAR CORNEA WITH STANDARD INTRAOCULAR LENS IMPLANT Right 07/10/2014    Procedure: Right Cataract Extraction with Intraocular Lens Implantation;  Surgeon: Karl Nava MD;  Location: [facility];  Service:     PHACOEMULSIFICATION CLEAR CORNEA WITH STANDARD INTRAOCULAR LENS IMPLANT Left 2014    Procedure: CATARACT EXTRACTION WITH INTRAOCULAR LENS IMPLANTATION LEFT EYE;  Surgeon: Karl Nava MD;  Location: [facility];  Service:     right finger surgery Right     Extensor Tendon Repair    TONSILLECTOMY       Family History   Problem Relation Age of Onset    Cerebrovascular Disease Mother     Heart Disease Mother     Heart Disease Father     Crohn's Disease Sister     Diabetes Brother         Social History     Socioeconomic History    Marital status:      Spouse name: Not on file    Number of children: Not on file    Years of education: Not on file    Highest education level: Not on file   Occupational History    Not on file   Tobacco Use    Smoking status: Former     Types: Cigars     Quit date:      Years since quittin.4     Passive exposure: Never    Smokeless tobacco: Never    Tobacco comments:     occasional cigar   Vaping Use    Vaping status: Never Used   Substance and Sexual Activity    Alcohol use: Yes     Comment: 2 drinks per month    Drug use: No    Sexual activity: Yes     Partners: Female   Other Topics Concern    Not on file   Social History Narrative    Not on file     Social Drivers of Health     Financial Resource Strain: Low Risk  (4/10/2025)    Financial Resource Strain     Within the past 12 months, have you or your family members you live with been unable to get utilities (heat, electricity) when it was really needed?: No   Food Insecurity: Low Risk  (4/10/2025)    Food Insecurity     Within the past 12 months, did you worry that your food would run out before you got money to buy more?: No     Within the past 12 months, did  the food you bought just not last and you didn t have money to get more?: No   Transportation Needs: Low Risk  (4/10/2025)    Transportation Needs     Within the past 12 months, has lack of transportation kept you from medical appointments, getting your medicines, non-medical meetings or appointments, work, or from getting things that you need?: No   Physical Activity: Insufficiently Active (4/10/2025)    Exercise Vital Sign     Days of Exercise per Week: 3 days     Minutes of Exercise per Session: 40 min   Stress: No Stress Concern Present (4/10/2025)    [employer] - Occupational Stress Questionnaire     Feeling of Stress : Only a little   Social Connections: Unknown (4/10/2025)    Social Connection and Isolation Panel [NHANES]     Frequency of Communication with Friends and Family: Not on file     Frequency of Social Gatherings with Friends and Family: Twice a week     Attends [employer] Services: Not on file     Active Member of Clubs or Organizations: Not on file     Attends Club or Organization Meetings: Not on file     Marital Status: Not on file   Interpersonal Safety: Low Risk  (1/23/2025)    Interpersonal Safety     Do you feel physically and emotionally safe where you currently live?: Yes     Within the past 12 months, have you been hit, slapped, kicked or otherwise physically hurt by someone?: No     Within the past 12 months, have you been humiliated or emotionally abused in other ways by your partner or ex-partner?: No   Housing Stability: Low Risk  (4/10/2025)    Housing Stability     Do you have housing? : Yes     Are you worried about losing your housing?: No           Medications  Allergies   Current Outpatient Medications   Medication Sig Dispense Refill    amLODIPine (NORVASC) 5 MG tablet TAKE 1 TABLET(5 MG) BY MOUTH DAILY 90 tablet 0    aspirin 81 MG tablet [ASPIRIN 81 MG TABLET] Take 81 mg by mouth daily.      atorvastatin (LIPITOR) 80 MG tablet TAKE 1/2 TABLET(40  "MG) BY MOUTH EVERY EVENING. 45 tablet 0    clopidogrel (PLAVIX) 75 MG tablet Take 1 tablet (75 mg) by mouth daily. 90 tablet 0    hydrochlorothiazide (HYDRODIURIL) 25 MG tablet TAKE 1 TABLET(25 MG) BY MOUTH DAILY. 90 tablet 0    levothyroxine (SYNTHROID/LEVOTHROID) 50 MCG tablet Take 1 tablet (50 mcg) by mouth every morning (before breakfast). 90 tablet 3    lisinopril (ZESTRIL) 20 MG tablet TAKE 1 TABLET BY MOUTH ONCE DAILY 180 tablet 1    metoprolol succinate ER (TOPROL XL) 25 MG 24 hr tablet TAKE 1 TABLET(25 MG) BY MOUTH DAILY 90 tablet 3    multivitamin with minerals (THERA-M) 9 mg iron-400 mcg Tab tablet [MULTIVITAMIN WITH MINERALS (THERA-M) 9 MG IRON-400 MCG TAB TABLET] Take 1 tablet by mouth daily.      omeprazole (PRILOSEC) 20 MG capsule [OMEPRAZOLE (PRILOSEC) 20 MG CAPSULE] Take 20 mg by mouth daily before breakfast.      sildenafil (VIAGRA) 50 MG tablet [SILDENAFIL (VIAGRA) 50 MG TABLET] Take 1 tablet (50 mg total) by mouth daily as needed for erectile dysfunction. 16 tablet 5     No Known Allergies       Lab Results    Chemistry/lipid CBC Cardiac Enzymes/BNP/TSH/INR   Recent Labs   Lab Test 04/11/25  0738   CHOL 147   HDL 51   LDL 74   TRIG 112     Recent Labs   Lab Test 04/11/25 0738 04/05/24 0720 11/29/22  0714   LDL 74 72 81     Recent Labs   Lab Test 04/11/25 0738      POTASSIUM 4.4   CHLORIDE 98   CO2 30*   GLC 93   BUN 13.1   CR 0.73   GFRESTIMATED >90   KASI 9.5     Recent Labs   Lab Test 04/11/25 0738 04/05/24 0720 11/29/22  0714   CR 0.73 0.78 0.71     Recent Labs   Lab Test 04/11/25 0738 04/05/24 0720 11/29/22  0714   A1C 5.6 5.4 5.6          Recent Labs   Lab Test 04/11/25 0738   WBC 8.5   HGB 14.4   HCT 41.6   MCV 93        Recent Labs   Lab Test 04/11/25 0738 04/05/24 0720 11/29/22 0714   HGB 14.4 14.6 15.2    No results for input(s): \"TROPONINI\" in the last 36597 hours.  No results for input(s): \"BNP\", \"NTBNPI\", \"NTBNP\" in the last 07280 hours.  Recent Labs   Lab Test " "04/11/25  0738   TSH 4.47*     No results for input(s): \"INR\" in the last 27737 hours.       Medications  Allergies   Current Outpatient Medications   Medication Sig Dispense Refill    amLODIPine (NORVASC) 5 MG tablet TAKE 1 TABLET(5 MG) BY MOUTH DAILY 90 tablet 0    aspirin 81 MG tablet [ASPIRIN 81 MG TABLET] Take 81 mg by mouth daily.      atorvastatin (LIPITOR) 80 MG tablet TAKE 1/2 TABLET(40 MG) BY MOUTH EVERY EVENING. 45 tablet 0    clopidogrel (PLAVIX) 75 MG tablet Take 1 tablet (75 mg) by mouth daily. 90 tablet 0    hydrochlorothiazide (HYDRODIURIL) 25 MG tablet TAKE 1 TABLET(25 MG) BY MOUTH DAILY. 90 tablet 0    levothyroxine (SYNTHROID/LEVOTHROID) 50 MCG tablet Take 1 tablet (50 mcg) by mouth every morning (before breakfast). 90 tablet 3    lisinopril (ZESTRIL) 20 MG tablet TAKE 1 TABLET BY MOUTH ONCE DAILY 180 tablet 1    metoprolol succinate ER (TOPROL XL) 25 MG 24 hr tablet TAKE 1 TABLET(25 MG) BY MOUTH DAILY 90 tablet 3    multivitamin with minerals (THERA-M) 9 mg iron-400 mcg Tab tablet [MULTIVITAMIN WITH MINERALS (THERA-M) 9 MG IRON-400 MCG TAB TABLET] Take 1 tablet by mouth daily.      omeprazole (PRILOSEC) 20 MG capsule [OMEPRAZOLE (PRILOSEC) 20 MG CAPSULE] Take 20 mg by mouth daily before breakfast.      sildenafil (VIAGRA) 50 MG tablet [SILDENAFIL (VIAGRA) 50 MG TABLET] Take 1 tablet (50 mg total) by mouth daily as needed for erectile dysfunction. 16 tablet 5      No Known Allergies       Lab Results   Lab Results   Component Value Date     04/11/2025    CO2 30 04/11/2025    CO2 27 03/26/2021    BUN 13.1 04/11/2025    BUN 12 03/26/2021     Lab Results   Component Value Date    WBC 8.5 04/11/2025    HGB 14.4 04/11/2025    HCT 41.6 04/11/2025    MCV 93 04/11/2025     04/11/2025     Lab Results   Component Value Date    CHOL 147 04/11/2025    TRIG 112 04/11/2025    HDL 51 04/11/2025     No results found for: \"INR\"  No results found for: \"BNP\"  No results found for: \"CKTOTAL\", \"CKMB\", " "\"TROPONINI\"  Lab Results   Component Value Date    TSH 4.47 04/11/2025    TSH 2.01 03/26/2021                  "

## 2025-06-10 ENCOUNTER — HOSPITAL ENCOUNTER (OUTPATIENT)
Dept: MRI IMAGING | Facility: HOSPITAL | Age: 75
Discharge: HOME | End: 2025-06-10
Attending: INTERNAL MEDICINE
Payer: COMMERCIAL

## 2025-06-10 VITALS — OXYGEN SATURATION: 95 % | SYSTOLIC BLOOD PRESSURE: 105 MMHG | HEART RATE: 78 BPM | DIASTOLIC BLOOD PRESSURE: 63 MMHG

## 2025-06-10 DIAGNOSIS — I25.10 CORONARY ARTERY DISEASE INVOLVING NATIVE CORONARY ARTERY OF NATIVE HEART WITHOUT ANGINA PECTORIS: Primary | ICD-10-CM

## 2025-06-10 DIAGNOSIS — R06.09 DOE (DYSPNEA ON EXERTION): ICD-10-CM

## 2025-06-10 DIAGNOSIS — I25.10 CORONARY ARTERY DISEASE INVOLVING NATIVE CORONARY ARTERY OF NATIVE HEART WITHOUT ANGINA PECTORIS: ICD-10-CM

## 2025-06-10 LAB
ATRIAL RATE - MUSE: 71 BPM
ATRIAL RATE - MUSE: 80 BPM
DIASTOLIC BLOOD PRESSURE - MUSE: NORMAL MMHG
DIASTOLIC BLOOD PRESSURE - MUSE: NORMAL MMHG
INTERPRETATION ECG - MUSE: NORMAL
INTERPRETATION ECG - MUSE: NORMAL
P AXIS - MUSE: 42 DEGREES
P AXIS - MUSE: 56 DEGREES
PR INTERVAL - MUSE: 168 MS
PR INTERVAL - MUSE: 176 MS
QRS DURATION - MUSE: 134 MS
QRS DURATION - MUSE: 134 MS
QT - MUSE: 418 MS
QT - MUSE: 428 MS
QTC - MUSE: 454 MS
QTC - MUSE: 493 MS
R AXIS - MUSE: 79 DEGREES
R AXIS - MUSE: 81 DEGREES
SYSTOLIC BLOOD PRESSURE - MUSE: NORMAL MMHG
SYSTOLIC BLOOD PRESSURE - MUSE: NORMAL MMHG
T AXIS - MUSE: 10 DEGREES
T AXIS - MUSE: 36 DEGREES
VENTRICULAR RATE- MUSE: 71 BPM
VENTRICULAR RATE- MUSE: 80 BPM

## 2025-06-10 PROCEDURE — 93016 CV STRESS TEST SUPVJ ONLY: CPT | Performed by: INTERNAL MEDICINE

## 2025-06-10 PROCEDURE — 250N000011 HC RX IP 250 OP 636: Mod: JZ | Performed by: INTERNAL MEDICINE

## 2025-06-10 PROCEDURE — 93005 ELECTROCARDIOGRAM TRACING: CPT

## 2025-06-10 PROCEDURE — 75563 CARD MRI W/STRESS IMG & DYE: CPT | Mod: 26 | Performed by: INTERNAL MEDICINE

## 2025-06-10 PROCEDURE — 999N000122 MR MYOCARDIUM  OVERREAD

## 2025-06-10 PROCEDURE — 93018 CV STRESS TEST I&R ONLY: CPT | Performed by: INTERNAL MEDICINE

## 2025-06-10 PROCEDURE — A9585 GADOBUTROL INJECTION: HCPCS | Performed by: INTERNAL MEDICINE

## 2025-06-10 PROCEDURE — 255N000002 HC RX 255 OP 636: Performed by: INTERNAL MEDICINE

## 2025-06-10 PROCEDURE — 75563 CARD MRI W/STRESS IMG & DYE: CPT

## 2025-06-10 RX ORDER — REGADENOSON 0.08 MG/ML
0.4 INJECTION, SOLUTION INTRAVENOUS ONCE
Status: COMPLETED | OUTPATIENT
Start: 2025-06-10 | End: 2025-06-10

## 2025-06-10 RX ORDER — GADOBUTROL 604.72 MG/ML
20 INJECTION INTRAVENOUS ONCE
Status: COMPLETED | OUTPATIENT
Start: 2025-06-10 | End: 2025-06-10

## 2025-06-10 RX ORDER — AMINOPHYLLINE 25 MG/ML
50-100 INJECTION, SOLUTION INTRAVENOUS
Status: DISCONTINUED | OUTPATIENT
Start: 2025-06-10 | End: 2025-06-11 | Stop reason: HOSPADM

## 2025-06-10 RX ADMIN — REGADENOSON 0.4 MG: 0.08 INJECTION, SOLUTION INTRAVENOUS at 08:27

## 2025-06-10 RX ADMIN — GADOBUTROL 20 ML: 604.72 INJECTION INTRAVENOUS at 08:28

## 2025-06-11 ENCOUNTER — RESULTS FOLLOW-UP (OUTPATIENT)
Dept: CARDIOLOGY | Facility: CLINIC | Age: 75
End: 2025-06-11

## 2025-06-12 ENCOUNTER — RESULTS FOLLOW-UP (OUTPATIENT)
Dept: CARDIOLOGY | Facility: CLINIC | Age: 75
End: 2025-06-12

## 2025-06-12 ENCOUNTER — TELEPHONE (OUTPATIENT)
Dept: CARDIOLOGY | Facility: CLINIC | Age: 75
End: 2025-06-12
Payer: COMMERCIAL

## 2025-06-12 DIAGNOSIS — R06.09 DOE (DYSPNEA ON EXERTION): ICD-10-CM

## 2025-06-12 DIAGNOSIS — I25.10 CORONARY ARTERY DISEASE INVOLVING NATIVE CORONARY ARTERY OF NATIVE HEART WITHOUT ANGINA PECTORIS: Primary | ICD-10-CM

## 2025-06-12 DIAGNOSIS — R94.39 ABNORMAL CARDIOVASCULAR STRESS TEST: ICD-10-CM

## 2025-06-12 DIAGNOSIS — Z01.812 PRE-PROCEDURE LAB EXAM: ICD-10-CM

## 2025-06-12 NOTE — TELEPHONE ENCOUNTER
Me  KF    6/12/25 11:46 AM  Result Note  See tele encounter 6/12/25.  -----------------------------------  Donato Hernandez MD to Me (Selected Message)        6/12/25 11:40 AM  That sounds good.  MR Cardiac w Contrast and Stress  Donato Hernandez MD to Me (Selected Message)        6/12/25 11:40 AM  That sounds good.  View older events    Me to Donato Hernandez MD  KF    6/12/25 11:35 AM  Result Note  Okay to put in case request for Cor angio with possible PCI?  Thank you,  Chey HDZ to patient to discuss. He does not want to wait and would like to proceed.  Informed the procedure  will contact to arrange and a follow up letter will be sent through [employer]. Understanding verbalized.  --------------------------------------------------------------  Donato Hernandez MD to Me (Selected Message)        6/12/25 11:11 AM  MRI suggests new artery blockage. Would recommend cath but he can wait until Dr. Wilkes returns if he prefers.  MR Cardiac w Contrast and Stress  Donato Hernandez MD to Me        6/12/25 11:11 AM  MRI suggests new artery blockage. Would recommend cath but he can wait until Dr. Wilkes returns if he prefers.  Me to Donato Hernandez MD  KF    6/12/25 10:46 AM  Result Note  Dr. Hernandez,  In the absence of Dr. Wilkes will you please review and advise? Patient is requesting interpretation of test. Last seen in clinic 5/19/25 with subjective diminution in his exercise tolerance as well as associated dyspnea.   Any new orders or recommendations or can this wait for Dr. Wilkes?  Thank you,  Chey        Hx CAD with previous cor angiography and intervention.

## 2025-06-14 DIAGNOSIS — I10 ESSENTIAL HYPERTENSION, BENIGN: ICD-10-CM

## 2025-06-14 DIAGNOSIS — I25.10 CORONARY ARTERY DISEASE INVOLVING NATIVE CORONARY ARTERY OF NATIVE HEART WITHOUT ANGINA PECTORIS: ICD-10-CM

## 2025-06-14 DIAGNOSIS — E78.00 PURE HYPERCHOLESTEROLEMIA: ICD-10-CM

## 2025-06-14 DIAGNOSIS — I21.11 ST ELEVATION MYOCARDIAL INFARCTION INVOLVING RIGHT CORONARY ARTERY (H): ICD-10-CM

## 2025-06-14 DIAGNOSIS — I10 ESSENTIAL HYPERTENSION: ICD-10-CM

## 2025-06-16 ENCOUNTER — TELEPHONE (OUTPATIENT)
Dept: CARDIOLOGY | Facility: CLINIC | Age: 75
End: 2025-06-16
Payer: COMMERCIAL

## 2025-06-16 ENCOUNTER — PREP FOR PROCEDURE (OUTPATIENT)
Dept: CARDIOLOGY | Facility: CLINIC | Age: 75
End: 2025-06-16
Payer: COMMERCIAL

## 2025-06-16 DIAGNOSIS — I10 ESSENTIAL HYPERTENSION, BENIGN: ICD-10-CM

## 2025-06-16 DIAGNOSIS — R06.09 DOE (DYSPNEA ON EXERTION): Primary | ICD-10-CM

## 2025-06-16 DIAGNOSIS — I49.3 PVC'S (PREMATURE VENTRICULAR CONTRACTIONS): ICD-10-CM

## 2025-06-16 DIAGNOSIS — Z01.812 PRE-PROCEDURE LAB EXAM: ICD-10-CM

## 2025-06-16 DIAGNOSIS — R94.39 ABNORMAL CARDIOVASCULAR STRESS TEST: ICD-10-CM

## 2025-06-16 DIAGNOSIS — I21.11 ST ELEVATION MYOCARDIAL INFARCTION INVOLVING RIGHT CORONARY ARTERY (H): ICD-10-CM

## 2025-06-16 LAB
ABO + RH BLD: NORMAL
BLD GP AB SCN SERPL QL: NEGATIVE
SPECIMEN EXP DATE BLD: NORMAL

## 2025-06-16 RX ORDER — LISINOPRIL 20 MG/1
20 TABLET ORAL DAILY
Qty: 180 TABLET | Refills: 1 | Status: SHIPPED | OUTPATIENT
Start: 2025-06-16

## 2025-06-16 RX ORDER — ATORVASTATIN CALCIUM 80 MG/1
TABLET, FILM COATED ORAL
Qty: 45 TABLET | Refills: 2 | Status: SHIPPED | OUTPATIENT
Start: 2025-06-16

## 2025-06-16 RX ORDER — ASPIRIN 81 MG/1
243 TABLET, CHEWABLE ORAL ONCE
OUTPATIENT
Start: 2025-06-16

## 2025-06-16 RX ORDER — METOPROLOL SUCCINATE 25 MG/1
25 TABLET, EXTENDED RELEASE ORAL DAILY
Qty: 90 TABLET | Refills: 3 | Status: SHIPPED | OUTPATIENT
Start: 2025-06-16

## 2025-06-16 RX ORDER — SODIUM CHLORIDE 9 MG/ML
INJECTION, SOLUTION INTRAVENOUS CONTINUOUS
OUTPATIENT
Start: 2025-06-16

## 2025-06-16 RX ORDER — HYDROCHLOROTHIAZIDE 25 MG/1
25 TABLET ORAL DAILY
Qty: 90 TABLET | Refills: 2 | Status: SHIPPED | OUTPATIENT
Start: 2025-06-16

## 2025-06-16 RX ORDER — CLOPIDOGREL BISULFATE 75 MG/1
75 TABLET ORAL DAILY
Qty: 90 TABLET | Refills: 0 | Status: SHIPPED | OUTPATIENT
Start: 2025-06-16

## 2025-06-16 RX ORDER — LIDOCAINE 40 MG/G
CREAM TOPICAL
OUTPATIENT
Start: 2025-06-16

## 2025-06-16 RX ORDER — AMLODIPINE BESYLATE 5 MG/1
5 TABLET ORAL DAILY
Qty: 90 TABLET | Refills: 2 | Status: SHIPPED | OUTPATIENT
Start: 2025-06-16

## 2025-06-16 RX ORDER — ASPIRIN 325 MG
325 TABLET ORAL ONCE
OUTPATIENT
Start: 2025-06-16 | End: 2025-06-16

## 2025-06-16 RX ORDER — FENTANYL CITRATE 50 UG/ML
25 INJECTION, SOLUTION INTRAMUSCULAR; INTRAVENOUS
OUTPATIENT
Start: 2025-06-16

## 2025-06-16 NOTE — TELEPHONE ENCOUNTER
----- Message from Michelle Melchor sent at 6/12/2025  3:24 PM CDT -----  Regarding: RE: TANNER ordering  Case type: CA P.PCI    Procedure Physician(s): SEDRICK/LEATHA    Procedure Date and Patient Arrival Time: Monday 6/23, with arrival time of 0630AM    H&P: Pt will schedule with PMD    Pre-Procedure Lab Appt: Scheduled 6/17 AT [facility] - Please place lab orders if you haven't already!    Alerts/Important Info: 5 day sildenafil/viagra hold    Thank you,  Michelle  ----- Message -----  From: Chey Smith RN  Sent: 6/12/2025  11:53 AM CDT  To: [facility] Procedure  Pool - Lhe  Subject: TANNER ordering                                     Case Type: CA P.PCI  Ordering Provider: Dr. Wilkes  H&P: Needed  Alerts: 5 day sildenafil/viagra hold  Additional Info/Urgency: next available  Orders for Pre-Procedure Labs? 6/12/25 needs to be scheduled.  Thank you!

## 2025-06-16 NOTE — TELEPHONE ENCOUNTER
Josafat ARVIZU Cassy  [address] [facility] [address]  [phone] (home)     PCP:  Omega Wilkerson  H&P completed by:    Admit date 6/23/25 Arrival time:  06:30  Anticoagulation:  NA  Previous PCI: Yes  Bypass Grafts: No  Renal Issues: No  Diabetic?: No  Device?: No    Ambulation status: independent    Reason for Visit:  Telephone call to discuss pre-procedure education in preparation for: Coronary Angiogram with Possible PCI    Procedure Prep:  EKG results obtained, dated: To be completed on day of cath lab procedure  Hemogram results obtained: Completed on 6/17/25  Basic Metabolic Panel results obtained: Completed on 6/17/25  Pertinent cardiac test results: Stress MRI 6/10/25    Patient Education    Your arrival time is 06:30.  Location is [address] [address] - Main Entrance of the Hospital  Please plan on being at the hospital all day.  At any time, emergencies and/or urgent cases may come up which could delay the start of your procedure.      Pre-procedure instructions  Take your temperature in the morning prior to coming in.  If your temperature is 100 F please call [facility] [phone] and notify them.  If you do not have access to a thermometer at home, please come in for testing.  If you are running a temperature your procedure may be rescheduled.  Patient instructed to not Eat or Drink after midnight.  Patient instructed to shower the evening before or the morning of the procedure.  Patient instructed to arrange for transportation home following procedure from a responsible family member or friend. No driving for at least 24 hours.  Patient instructed to have a responsible adult with them for 24 hours post-procedure.  Post-procedure follow up process.  Conscious sedation discussed.      Pre-procedure medication instructions.  Continue medications as scheduled, with a small amount of water on the day of the procedure unless indicated. (NO  Diabetic Medications or Blood Thinners)  Pt instructed not to consume Alcohol, Tobacco, Caffeine, or Carbonated beverages 12 hours prior to procedure.  Patient instructed to take 325 mg of Aspirin the night before and morning of procedure: Yes   Other medication: instructed to only take amlodipine, Plavix, levothyroxine, lisinopril and metoprolol a.m. of the procedure.  Do not take Viagra 5 days prior to your procedure.    Patient states understanding of procedure and agrees to proceed.    *PATIENTS RECORDS AVAILABLE IN [facility] UNLESS OTHERWISE INDICATED*      Patient Active Problem List   Diagnosis    Male Erectile Disorder    Scoliosis    Primary Osteoarthritis Of The Lumbar Vertebrae    Personal history of malignant neoplasm of bladder    Essential Hypercholesterolemia    Benign Essential Hypertension    Coronary Artery Disease    STEMI (ST elevation myocardial infarction) (H)    Carpal Tunnel Syndrome    Chronic Reflux Esophagitis    Joint Pain, Localized In The Knee    Tubular adenoma of colon    Osteoarthritis of right knee    Trochanteric bursitis of both hips       Current Outpatient Medications   Medication Sig Dispense Refill    amLODIPine (NORVASC) 5 MG tablet TAKE 1 TABLET(5 MG) BY MOUTH DAILY 90 tablet 0    aspirin 81 MG tablet [ASPIRIN 81 MG TABLET] Take 81 mg by mouth daily.      atorvastatin (LIPITOR) 80 MG tablet TAKE 1/2 TABLET(40 MG) BY MOUTH EVERY EVENING. 45 tablet 0    clopidogrel (PLAVIX) 75 MG tablet Take 1 tablet (75 mg) by mouth daily. 90 tablet 0    hydrochlorothiazide (HYDRODIURIL) 25 MG tablet TAKE 1 TABLET(25 MG) BY MOUTH DAILY. 90 tablet 0    levothyroxine (SYNTHROID/LEVOTHROID) 50 MCG tablet Take 1 tablet (50 mcg) by mouth every morning (before breakfast). 90 tablet 3    lisinopril (ZESTRIL) 20 MG tablet TAKE 1 TABLET BY MOUTH ONCE DAILY 180 tablet 1    metoprolol succinate ER (TOPROL XL) 25 MG 24 hr tablet TAKE 1 TABLET(25 MG) BY MOUTH DAILY 90 tablet 3    multivitamin with minerals  (THERA-M) 9 mg iron-400 mcg Tab tablet [MULTIVITAMIN WITH MINERALS (THERA-M) 9 MG IRON-400 MCG TAB TABLET] Take 1 tablet by mouth daily.      omeprazole (PRILOSEC) 20 MG capsule [OMEPRAZOLE (PRILOSEC) 20 MG CAPSULE] Take 20 mg by mouth daily before breakfast.      sildenafil (VIAGRA) 50 MG tablet [SILDENAFIL (VIAGRA) 50 MG TABLET] Take 1 tablet (50 mg total) by mouth daily as needed for erectile dysfunction. 16 tablet 5       No Known Allergies    MOLINA BARGER RN on 6/16/2025 at 8:46 AM

## 2025-06-17 ENCOUNTER — OFFICE VISIT (OUTPATIENT)
Dept: INTERNAL MEDICINE | Facility: CLINIC | Age: 75
End: 2025-06-17
Payer: COMMERCIAL

## 2025-06-17 ENCOUNTER — LAB (OUTPATIENT)
Dept: CARDIOLOGY | Facility: CLINIC | Age: 75
End: 2025-06-17
Payer: COMMERCIAL

## 2025-06-17 VITALS
RESPIRATION RATE: 14 BRPM | BODY MASS INDEX: 32.88 KG/M2 | HEIGHT: 69 IN | DIASTOLIC BLOOD PRESSURE: 88 MMHG | OXYGEN SATURATION: 96 % | HEART RATE: 89 BPM | TEMPERATURE: 97.6 F | WEIGHT: 222 LBS | SYSTOLIC BLOOD PRESSURE: 130 MMHG

## 2025-06-17 DIAGNOSIS — I25.10 ATHEROSCLEROSIS OF NATIVE CORONARY ARTERY OF NATIVE HEART WITHOUT ANGINA PECTORIS: ICD-10-CM

## 2025-06-17 DIAGNOSIS — I10 ESSENTIAL HYPERTENSION, BENIGN: ICD-10-CM

## 2025-06-17 DIAGNOSIS — Z01.818 PREOP GENERAL PHYSICAL EXAM: Primary | ICD-10-CM

## 2025-06-17 DIAGNOSIS — R94.39 ABNORMAL CARDIOVASCULAR STRESS TEST: ICD-10-CM

## 2025-06-17 DIAGNOSIS — K21.00 GASTROESOPHAGEAL REFLUX DISEASE WITH ESOPHAGITIS WITHOUT HEMORRHAGE: ICD-10-CM

## 2025-06-17 DIAGNOSIS — R06.09 DOE (DYSPNEA ON EXERTION): ICD-10-CM

## 2025-06-17 DIAGNOSIS — F52.8 OTHER SEXUAL DYSFUNCTION NOT DUE TO A SUBSTANCE OR KNOWN PHYSIOLOGICAL CONDITION: ICD-10-CM

## 2025-06-17 DIAGNOSIS — E78.00 PURE HYPERCHOLESTEROLEMIA: ICD-10-CM

## 2025-06-17 DIAGNOSIS — Z01.812 PRE-PROCEDURE LAB EXAM: ICD-10-CM

## 2025-06-17 LAB
ANION GAP SERPL CALCULATED.3IONS-SCNC: 11 MMOL/L (ref 7–15)
BUN SERPL-MCNC: 11.4 MG/DL (ref 8–23)
CALCIUM SERPL-MCNC: 9.2 MG/DL (ref 8.8–10.4)
CHLORIDE SERPL-SCNC: 97 MMOL/L (ref 98–107)
CREAT SERPL-MCNC: 0.71 MG/DL (ref 0.67–1.17)
EGFRCR SERPLBLD CKD-EPI 2021: >90 ML/MIN/1.73M2
ERYTHROCYTE [DISTWIDTH] IN BLOOD BY AUTOMATED COUNT: 12.4 % (ref 10–15)
GLUCOSE SERPL-MCNC: 77 MG/DL (ref 70–99)
HCO3 SERPL-SCNC: 27 MMOL/L (ref 22–29)
HCT VFR BLD AUTO: 41.6 % (ref 40–53)
HGB BLD-MCNC: 14.3 G/DL (ref 13.3–17.7)
MCH RBC QN AUTO: 31.5 PG (ref 26.5–33)
MCHC RBC AUTO-ENTMCNC: 34.4 G/DL (ref 31.5–36.5)
MCV RBC AUTO: 92 FL (ref 78–100)
PLATELET # BLD AUTO: 220 10E3/UL (ref 150–450)
POTASSIUM SERPL-SCNC: 3.9 MMOL/L (ref 3.4–5.3)
RBC # BLD AUTO: 4.54 10E6/UL (ref 4.4–5.9)
SODIUM SERPL-SCNC: 135 MMOL/L (ref 135–145)
WBC # BLD AUTO: 8.7 10E3/UL (ref 4–11)

## 2025-06-17 PROCEDURE — 86850 RBC ANTIBODY SCREEN: CPT

## 2025-06-17 PROCEDURE — 80048 BASIC METABOLIC PNL TOTAL CA: CPT

## 2025-06-17 PROCEDURE — 86900 BLOOD TYPING SEROLOGIC ABO: CPT

## 2025-06-17 PROCEDURE — 3079F DIAST BP 80-89 MM HG: CPT | Performed by: NURSE PRACTITIONER

## 2025-06-17 PROCEDURE — 86901 BLOOD TYPING SEROLOGIC RH(D): CPT

## 2025-06-17 PROCEDURE — 36415 COLL VENOUS BLD VENIPUNCTURE: CPT

## 2025-06-17 PROCEDURE — 3075F SYST BP GE 130 - 139MM HG: CPT | Performed by: NURSE PRACTITIONER

## 2025-06-17 PROCEDURE — 85027 COMPLETE CBC AUTOMATED: CPT

## 2025-06-17 PROCEDURE — 99214 OFFICE O/P EST MOD 30 MIN: CPT | Performed by: NURSE PRACTITIONER

## 2025-06-17 NOTE — PATIENT INSTRUCTIONS
How to Take Your Medication Before Surgery  Preoperative Medication Instructions   Antiplatelet or Anticoagulation Medication Instructions   - aspirin: take 4 81mg tabs the morning of procedure per cardiology recommendation   - clopidrogel (Plavix), prasugrel (Effient), ticagrelor (Brilinta): Patient has a cardiac stent. Medication will NOT be stopped until cleared by cardiology.     Additional Medication Instructions  Please TAKE 4 x 81 mg aspirin, amlodipine, Plavix, levothyroxine, lisinopril and  metoprolol on the morning of procedure.   Do NOT TAKE sildenafil (Viagra) for at least 5 days prior to procedure.   Do NOT TAKE hydrochlorothiazide or multivitamins  the morning of procedure.        Patient Education   Preparing for Your Surgery  For Adults  Getting started  In most cases, a nurse will call to review your health history and instructions. They will give you an arrival time based on your scheduled surgery time. Please be ready to share:  Your doctor's clinic name and phone number  Your medical, surgical, and anesthesia history  A list of allergies and sensitivities  A list of medicines, including herbal treatments and over-the-counter drugs  Whether the patient has a legal guardian (ask how to send us the papers in advance)  Note: You may not receive a call if you were seen at our PAC (Preoperative Assessment Center).  Please tell us if you're pregnant--or if there's any chance you might be pregnant. Some surgeries may injure a fetus (unborn baby), so they require a pregnancy test. Surgeries that are safe for a fetus don't always need a test, and you can choose whether to have one.   Preparing for surgery  Within 10 to 30 days of surgery: Have a pre-op exam (sometimes called an H&P, or History and Physical). This can be done at a clinic or pre-operative center.  If you're having a , you may not need this exam. Talk to your care team.  At your pre-op exam, talk to your care team about all medicines  you take. (This includes CBD oil and any drugs, such as THC, marijuana, and other forms of cannabis.) If you need to stop any medicine before surgery, ask when to start taking it again.  This is for your safety. Many medicines and drugs can make you bleed too much during surgery. Some change how well surgery (anesthesia) drugs work.  Call your insurance company to let them know you're having surgery. (If you don't have insurance, call [phone].)  Call your clinic if there's any change in your health. This includes a scrape or scratch near the surgery site, or any signs of a cold (sore throat, runny nose, cough, rash, fever).  Eating and drinking guidelines  For your safety: Unless your surgeon tells you otherwise, follow the guidelines below.  Eat and drink as normal until 8 hours before you arrive for surgery. After that, no food or milk. You can spit out gum when you arrive.  Drink clear liquids until 2 hours before you arrive. These are liquids you can see through, like water, Gatorade, and Propel Water. They also include plain black coffee and tea (no cream or milk).  No alcohol for 24 hours before you arrive. The night before surgery, stop any drinks that contain THC.  If your care team tells you to take medicine on the morning of surgery, it's okay to take it with a sip of water. No other medicines or drugs are allowed (including CBD oil)--follow your care team's instructions.  If you have questions the day of surgery, call your hospital or surgery center.   Preventing infection  Shower or bathe the night before and the morning of surgery. Follow the instructions your clinic gave you. (If no instructions, use regular soap.)  Don't shave or clip hair near your surgery site. We'll remove the hair if needed.  Don't smoke or vape the morning of surgery. No chewing tobacco for 6 hours before you arrive. A nicotine patch is okay. You may spit out nicotine gum when you arrive.  For some surgeries, the surgeon  will tell you to fully quit smoking and nicotine.  We will make every effort to keep you safe from infection. We will:  Clean our hands often with soap and water (or an alcohol-based hand rub).  Clean the skin at your surgery site with a special soap that kills germs.  Give you a special gown to keep you warm. (Cold raises the risk of infection.)  Wear hair covers, masks, gowns, and gloves during surgery.  Give antibiotic medicine, if prescribed. Not all surgeries need this medicine.  What to bring on the day of surgery  Photo ID and insurance card  Copy of your health care directive, if you have one  Glasses and hearing aids (bring cases)  You can't wear contacts during surgery  Inhaler and eye drops, if you use them (tell us about these when you arrive)  CPAP machine or breathing device, if you use them  A few personal items, if spending the night  If you have . . .  A pacemaker, ICD (cardiac defibrillator), or other implant: Bring the ID card.  An implanted stimulator: Bring the remote control.  A legal guardian: Bring a copy of the certified (court-stamped) guardianship papers.  Please remove any jewelry, including body piercings. Leave jewelry and other valuables at home.  If you're going home the day of surgery  You must have a support person drive you home. They should stay with you overnight, and they may need to help with your self-care.  If you don't have a support person, please tells us as soon as possible. We can help.  After surgery  If it's hard to control your pain or you need more pain medicine, please call your surgeon's office.  Questions?   If you have any questions for your care team, list them here:   ____________________________________________________________________________________________________________________________________________________________________________________________________________________________________________________________  For informational purposes only. Not to  replace the advice of your health care provider. Copyright   2003, 2019 [facility]. All rights reserved. Clinically reviewed by Yaw Cotto MD. [employer] [id number] - REV 02/25.

## 2025-06-17 NOTE — PROGRESS NOTES
Preoperative Evaluation  [facility]  [address] [address]  Phone: [phone]  Fax: [phone]  Primary Provider: JORDAN SOLOMON MD  Pre-op Performing Provider: Maia Obrien NP  Jun 17, 2025 6/13/2025   Surgical Information   What procedure is being done? Coronary Angiogram   Facility or Hospital where procedure/surgery will be performed: [facility]   Who is doing the procedure / surgery? [facility]   Date of surgery / procedure: 6/23/25   Time of surgery / procedure:    Where do you plan to recover after surgery? at home with family     Fax number for surgical facility: Note does not need to be faxed, will be available electronically in Epic.    Assessment & Plan     The proposed surgical procedure is considered INTERMEDIATE risk.    Preop general physical exam  Atherosclerosis of native coronary artery of native heart without angina pectoris  Abnormal cardiovascular stress test  PLAZA (dyspnea on exertion)  No contraindication to proceed with coronary angiogram.  Approved preoperative guidelines.  Preoperative labs and EKG done through cardiology prior to today's visit.  He is currently on aspirin and Plavix.  Advised by cardiology to take 4 baby aspirin the morning of the procedure and continue on his Plavix.    Gastroesophageal reflux disease with esophagitis without hemorrhage  Well-controlled on omeprazole.    Essential Hypercholesterolemia  Currently on Lipitor.  Lipid panel normal with an LDL of 74 on 4/11/2025.    Benign Essential Hypertension  Currently on amlodipine 5 mg daily, lisinopril 20 mg daily, metoprolol 25 mg daily and hydrochlorothiazide 25 mg daily.  Advised to hold the hydrochlorothiazide the morning of surgery but advised by cardiology to continue with amlodipine, lisinopril and metoprolol prior to surgery.    Male Erectile Disorder  Uses Viagra as needed.  Advised to hold this 5 days prior to procedure.            - No  identified additional risk factors other than previously addressed    Preoperative Medication Instructions  Antiplatelet or Anticoagulation Medication Instructions   - aspirin: take 4 81mg tabs the morning of procedure per cardiology recommendation   - clopidrogel (Plavix), prasugrel (Effient), ticagrelor (Brilinta): Patient has a cardiac stent. Medication will NOT be stopped until cleared by cardiology.     Additional Medication Instructions  Please TAKE 4 x 81 mg aspirin, amlodipine, Plavix, levothyroxine, lisinopril and  metoprolol on the morning of procedure.   Do NOT TAKE sildenafil (Viagra) for at least 5 days prior to procedure.   Do NOT TAKE hydrochlorothiazide or multivitamins  the morning of procedure.     Recommendation  Approval given to proceed with proposed procedure, without further diagnostic evaluation.      Subjective   Kyle is a 74 year old, presenting for the following:  Pre-Op Exam (Angiogram on 6/23/25 at [facility] by [facility])          6/17/2025    10:36 AM   Additional Questions   Roomed by Queenie MANCERA: Kyle is a pleasant 74-year-old male here today for preoperative evaluation prior to a coronary angiogram.  He has a significant history of coronary artery disease and prior MI with percutaneous interventions in 1998 and again in 2008.  In 2008 he had 2 stents placed at that time.  In 2016 he underwent an additional angiography and had PCI with stenting of 2 arteries.  He underwent a stress test again in December 2018 that showed no evidence of ischemia and normal functional capacity.  In 2022 he had a repeat angiography due to unstable ischemia.  At that time he had a PCI with stent placement.  MRI in October 2022 showed no evidence of ischemia.  More recently he has had some shortness of breath with activities.  He denies any chest pain.  Due to that he had another stress test on 6/10/2025 that was suggestive of a new blockage.  Plan is for coronary angiogram on 6/23/2025.  He has no  acute concerns today.         6/13/2025   Pre-Op Questionnaire   Have you ever had a heart attack or stroke? (!) YES    Have you ever had surgery on your heart or blood vessels, such as a stent placement, a coronary artery bypass, or surgery on an artery in your head, neck, heart, or legs? (!) YES    Do you have chest pain with activity? No   Do you have a history of heart failure? No   Do you currently have a cold, bronchitis or symptoms of other infection? No   Do you have a cough, shortness of breath, or wheezing? No   Do you or anyone in your family have previous history of blood clots? No   Do you or does anyone in your family have a serious bleeding problem such as prolonged bleeding following surgeries or cuts? No   Have you ever had problems with anemia or been told to take iron pills? No   Have you had any abnormal blood loss such as black, tarry or bloody stools? No   Have you ever had a blood transfusion? No   Are you willing to have a blood transfusion if it is medically needed before, during, or after your surgery? Yes   Have you or any of your relatives ever had problems with anesthesia? No   Do you have sleep apnea, excessive snoring or daytime drowsiness? No   Do you have any artifical heart valves or other implanted medical devices like a pacemaker, defibrillator, or continuous glucose monitor? No   Do you have artificial joints? No   Are you allergic to latex? No     Advance Care Planning    Discussed advance care planning with patient; however, patient declined at this time.    Preoperative Review of    reviewed - no record of controlled substances prescribed.          Patient Active Problem List    Diagnosis Date Noted    Trochanteric bursitis of both hips 01/24/2019     Priority: Medium    Osteoarthritis of right knee 04/18/2018     Priority: Medium     See [facility] notes        Essential Hypercholesterolemia      Priority: Medium     Created by Conversion        Benign Essential  Hypertension      Priority: Medium     Created by Conversion        Coronary Artery Disease      Priority: Medium     Created by Conversion  Replacement Utility updated for latest IMO load        Chronic Reflux Esophagitis      Priority: Medium     Created by Conversion        STEMI (ST elevation myocardial infarction) (H)      Priority: Medium     Created by Conversion  [facility] Annotation: Aug 11 2008  5:22PM - A[place] Boothe[place], [place] to [place]  Replacement Utility updated for latest IMO load        Tubular adenoma of colon 06/30/2016     Priority: Medium     See colonoscopy 06/23/2016        Primary Osteoarthritis Of The Lumbar Vertebrae      Priority: Medium     Created by Conversion        Joint Pain, Localized In The Knee      Priority: Medium     Created by Conversion        Male Erectile Disorder      Priority: Medium     Created by Conversion        Scoliosis      Priority: Medium     Created by Conversion  [employer] Annotation: Sep 17 2013  5:34PM - Avery Boothe: likely   lifelong    Replacing diagnoses that were inactivated after the 10/1/2021 regulatory import.      Personal history of malignant neoplasm of bladder      Priority: Medium     Created by Conversion        Carpal Tunnel Syndrome      Priority: Medium     Created by Conversion  [employer] Annotation: Jan 13 2011 12:58PM - Avery Boothe: bilateral   by   EMG, mild          Past Medical History:   Diagnosis Date    Bladder cancer (H)     see [facility] notes    Carpal tunnel syndrome     Cataract     Coronary artery disease     GERD (gastroesophageal reflux disease)     Hypercholesteremia     Hypertension     Lower back pain     Scoliosis      Past Surgical History:   Procedure Laterality Date    ANGIOPLASTY  1998/2008    BLADDER SURGERY  03/01/2014    Polyp removal    HC REMOVAL OF TONSILS,<9 Y/O      Description: Tonsillectomy;  Recorded: 06/10/2008;    HERNIA REPAIR  01/01/2009     Bilateral    PHACOEMULSIFICATION CLEAR CORNEA WITH STANDARD INTRAOCULAR LENS IMPLANT Right 07/10/2014    Procedure: Right Cataract Extraction with Intraocular Lens Implantation;  Surgeon: Karl Nava MD;  Location: [facility] OR;  Service:     PHACOEMULSIFICATION CLEAR CORNEA WITH STANDARD INTRAOCULAR LENS IMPLANT Left 08/14/2014    Procedure: CATARACT EXTRACTION WITH INTRAOCULAR LENS IMPLANTATION LEFT EYE;  Surgeon: Karl Nava MD;  Location: [facility] OR;  Service:     right finger surgery Right     Extensor Tendon Repair    TONSILLECTOMY       Current Outpatient Medications   Medication Sig Dispense Refill    amLODIPine (NORVASC) 5 MG tablet TAKE 1 TABLET(5 MG) BY MOUTH DAILY 90 tablet 2    aspirin 81 MG tablet [ASPIRIN 81 MG TABLET] Take 81 mg by mouth daily.      atorvastatin (LIPITOR) 80 MG tablet TAKE 1/2 TABLET(40 MG) BY MOUTH EVERY EVENING. 45 tablet 2    clopidogrel (PLAVIX) 75 MG tablet TAKE 1 TABLET(75 MG) BY MOUTH DAILY 90 tablet 0    hydrochlorothiazide (HYDRODIURIL) 25 MG tablet TAKE 1 TABLET(25 MG) BY MOUTH DAILY. 90 tablet 2    levothyroxine (SYNTHROID/LEVOTHROID) 50 MCG tablet Take 1 tablet (50 mcg) by mouth every morning (before breakfast). 90 tablet 3    lisinopril (ZESTRIL) 20 MG tablet TAKE 1 TABLET BY MOUTH ONCE DAILY 180 tablet 1    metoprolol succinate ER (TOPROL XL) 25 MG 24 hr tablet TAKE 1 TABLET(25 MG) BY MOUTH DAILY 90 tablet 3    multivitamin with minerals (THERA-M) 9 mg iron-400 mcg Tab tablet [MULTIVITAMIN WITH MINERALS (THERA-M) 9 MG IRON-400 MCG TAB TABLET] Take 1 tablet by mouth daily.      omeprazole (PRILOSEC) 20 MG capsule [OMEPRAZOLE (PRILOSEC) 20 MG CAPSULE] Take 20 mg by mouth daily before breakfast.      sildenafil (VIAGRA) 50 MG tablet [SILDENAFIL (VIAGRA) 50 MG TABLET] Take 1 tablet (50 mg total) by mouth daily as needed for erectile dysfunction. 16 tablet 5       No Known Allergies     Social History     Tobacco Use    Smoking status: Some Days     Types:  "Cigars     Last attempt to quit: 1985     Years since quittin.4     Passive exposure: Current    Smokeless tobacco: Never    Tobacco comments:     occasional cigar   Substance Use Topics    Alcohol use: Yes     Comment: 2 drinks per month       History   Drug Use No         ROS:  Constitutional: No fever or unexplained weight loss.     HEENT: Negative for ear pain, changes in hearing, frequent nosebleeds, nasal congestion, runny nose, sore throat, loose teeth. Denies use of dentures or partials.    Eyes: Denies any changes in vision or eye pain.    Respiratory: Negative for cough or wheezing.  Shortness of breath on exertion.    Cardiovascular: Negative except as noted in HPI.     Gastrointestinal: Negative for nausea, vomiting, abdominal pain, uncontrolled heartburn or changes in bowel movements.    Genitourinary: Negative for any urinary symptoms or changes in urinary habits.    Integumentary: Negative for any rash.    Musculoskeletal: Denies any new joint pain.     Neurological: Negative for severe dizziness, headaches or numbness.    Psychiatric: No severe anxiety or significant depression.  No issues with sleep.  Denies recreational drug use or regular use of alcohol.    Allergic/immunologic: Negative for any history of complications with anesthesia.      Objective    /88 (BP Location: Right arm, Patient Position: Sitting)   Pulse 89   Temp 97.6  F (36.4  C)   Resp 14   Ht 1.753 m (5' 9\")   Wt 100.7 kg (222 lb)   SpO2 96%   BMI 32.78 kg/m     Estimated body mass index is 32.78 kg/m  as calculated from the following:    Height as of this encounter: 1.753 m (5' 9\").    Weight as of this encounter: 100.7 kg (222 lb).  Physical Exam  Constitutional: In no acute distress.  Clean appearance.  Ears: Bilateral TMs are intact without any erythema or effusion.  Grossly normal hearing.  Wears hearing aids.  Oropharynx: Normal mucosa.  Dentition and gingiva is appropriate.  Posterior oropharynx without " any abnormalities.  Neck: Supple.  Trachea is midline.  No thyromegaly.  Neck is without tenderness or masses.  Cardiovascular: Regular rate and rhythm.  Normal peripheral perfusion.  No edema.    Respiratory: Lungs are clear bilaterally.  Normal respiratory effort.  Skin: Skin is pink, warm and dry. No rashes.  Gastrointestinal: Soft and flat.  Normal bowel sounds.  Nontender throughout upon palpation.  No organomegaly or masses.    Musculoskeletal:  Normal range of motion of extremities.  Gait normal.  Able to mount exam table without difficulties.  Psychiatric: Appropriate affect and demeanor.  Memory intact.  Good insight and judgment.  Neurologic: Sensation and temperature of extremities appropriate.  No tremor or involuntary movement noted.      Recent Labs   Lab Test 06/17/25  1007 04/11/25  0738   HGB 14.3 14.4    225    138   POTASSIUM 3.9 4.4   CR 0.71 0.73   A1C  --  5.6        Diagnostics  No labs were ordered during this visit.   No EKG this visit, completed in the last 90 days.    Revised Cardiac Risk Index (RCRI)  The patient has the following serious cardiovascular risks for perioperative complications:   - Coronary Artery Disease (MI, positive stress test, angina, Qs on EKG) = 1 point     RCRI Interpretation: 1 point: Class II (low risk - 0.9% complication rate)         Signed Electronically by: Maia Obrien NP  A copy of this evaluation report is provided to the requesting physician.

## 2025-06-17 NOTE — H&P (VIEW-ONLY)
Preoperative Evaluation  [facility]  [address] [address]  Phone: [phone]  Fax: [phone]  Primary Provider: JORDAN SOLOMON MD  Pre-op Performing Provider: Maia Obrien NP  Jun 17, 2025 6/13/2025   Surgical Information   What procedure is being done? Coronary Angiogram   Facility or Hospital where procedure/surgery will be performed: [facility]   Who is doing the procedure / surgery? [facility]   Date of surgery / procedure: 6/23/25   Time of surgery / procedure:    Where do you plan to recover after surgery? at home with family     Fax number for surgical facility: Note does not need to be faxed, will be available electronically in Epic.    Assessment & Plan     The proposed surgical procedure is considered INTERMEDIATE risk.    Preop general physical exam  Atherosclerosis of native coronary artery of native heart without angina pectoris  Abnormal cardiovascular stress test  PLAZA (dyspnea on exertion)  No contraindication to proceed with coronary angiogram.  Approved preoperative guidelines.  Preoperative labs and EKG done through cardiology prior to today's visit.  He is currently on aspirin and Plavix.  Advised by cardiology to take 4 baby aspirin the morning of the procedure and continue on his Plavix.    Gastroesophageal reflux disease with esophagitis without hemorrhage  Well-controlled on omeprazole.    Essential Hypercholesterolemia  Currently on Lipitor.  Lipid panel normal with an LDL of 74 on 4/11/2025.    Benign Essential Hypertension  Currently on amlodipine 5 mg daily, lisinopril 20 mg daily, metoprolol 25 mg daily and hydrochlorothiazide 25 mg daily.  Advised to hold the hydrochlorothiazide the morning of surgery but advised by cardiology to continue with amlodipine, lisinopril and metoprolol prior to surgery.    Male Erectile Disorder  Uses Viagra as needed.  Advised to hold this 5 days prior to procedure.            - No  identified additional risk factors other than previously addressed    Preoperative Medication Instructions  Antiplatelet or Anticoagulation Medication Instructions   - aspirin: take 4 81mg tabs the morning of procedure per cardiology recommendation   - clopidrogel (Plavix), prasugrel (Effient), ticagrelor (Brilinta): Patient has a cardiac stent. Medication will NOT be stopped until cleared by cardiology.     Additional Medication Instructions  Please TAKE 4 x 81 mg aspirin, amlodipine, Plavix, levothyroxine, lisinopril and  metoprolol on the morning of procedure.   Do NOT TAKE sildenafil (Viagra) for at least 5 days prior to procedure.   Do NOT TAKE hydrochlorothiazide or multivitamins  the morning of procedure.     Recommendation  Approval given to proceed with proposed procedure, without further diagnostic evaluation.      Subjective   Kyel is a 74 year old, presenting for the following:  Pre-Op Exam (Angiogram on 6/23/25 at [facility] by yeimy)          6/17/2025    10:36 AM   Additional Questions   Roomed by Queenie MANCERA: Kyle is a pleasant 74-year-old male here today for preoperative evaluation prior to a coronary angiogram.  He has a significant history of coronary artery disease and prior MI with percutaneous interventions in 1998 and again in 2008.  In 2008 he had 2 stents placed at that time.  In 2016 he underwent an additional angiography and had PCI with stenting of 2 arteries.  He underwent a stress test again in December 2018 that showed no evidence of ischemia and normal functional capacity.  In 2022 he had a repeat angiography due to unstable ischemia.  At that time he had a PCI with stent placement.  MRI in October 2022 showed no evidence of ischemia.  More recently he has had some shortness of breath with activities.  He denies any chest pain.  Due to that he had another stress test on 6/10/2025 that was suggestive of a new blockage.  Plan is for coronary angiogram on 6/23/2025.  He has no  acute concerns today.         6/13/2025   Pre-Op Questionnaire   Have you ever had a heart attack or stroke? (!) YES    Have you ever had surgery on your heart or blood vessels, such as a stent placement, a coronary artery bypass, or surgery on an artery in your head, neck, heart, or legs? (!) YES    Do you have chest pain with activity? No   Do you have a history of heart failure? No   Do you currently have a cold, bronchitis or symptoms of other infection? No   Do you have a cough, shortness of breath, or wheezing? No   Do you or anyone in your family have previous history of blood clots? No   Do you or does anyone in your family have a serious bleeding problem such as prolonged bleeding following surgeries or cuts? No   Have you ever had problems with anemia or been told to take iron pills? No   Have you had any abnormal blood loss such as black, tarry or bloody stools? No   Have you ever had a blood transfusion? No   Are you willing to have a blood transfusion if it is medically needed before, during, or after your surgery? Yes   Have you or any of your relatives ever had problems with anesthesia? No   Do you have sleep apnea, excessive snoring or daytime drowsiness? No   Do you have any artifical heart valves or other implanted medical devices like a pacemaker, defibrillator, or continuous glucose monitor? No   Do you have artificial joints? No   Are you allergic to latex? No     Advance Care Planning    Discussed advance care planning with patient; however, patient declined at this time.    Preoperative Review of    reviewed - no record of controlled substances prescribed.          Patient Active Problem List    Diagnosis Date Noted    Trochanteric bursitis of both hips 01/24/2019     Priority: Medium    Osteoarthritis of right knee 04/18/2018     Priority: Medium     See [facility] notes        Essential Hypercholesterolemia      Priority: Medium     Created by Conversion        Benign Essential  Hypertension      Priority: Medium     Created by Conversion        Coronary Artery Disease      Priority: Medium     Created by Conversion  Replacement Utility updated for latest IMO load        Chronic Reflux Esophagitis      Priority: Medium     Created by Conversion        STEMI (ST elevation myocardial infarction) (H)      Priority: Medium     Created by Conversion  [employer] [facility] Annotation: Aug 11 2008  5:22PM - Avery Boothe: [place], airlifted to [place]  Replacement Utility updated for latest IMO load        Tubular adenoma of colon 06/30/2016     Priority: Medium     See colonoscopy 06/23/2016        Primary Osteoarthritis Of The Lumbar Vertebrae      Priority: Medium     Created by Conversion        Joint Pain, Localized In The Knee      Priority: Medium     Created by Conversion        Male Erectile Disorder      Priority: Medium     Created by Conversion        Scoliosis      Priority: Medium     Created by Conversion  [employer] Annotation: Sep 17 2013  5:34PM - Avery Boothe: likely   lifelong    Replacing diagnoses that were inactivated after the 10/1/2021 regulatory import.      Personal history of malignant neoplasm of bladder      Priority: Medium     Created by Conversion        Carpal Tunnel Syndrome      Priority: Medium     Created by Conversion  [employer] Annotation: Jan 13 2011 12:58PM - Avery Boothe: bilateral   by   EMG, mild          Past Medical History:   Diagnosis Date    Bladder cancer (H)     see [facility] notes    Carpal tunnel syndrome     Cataract     Coronary artery disease     GERD (gastroesophageal reflux disease)     Hypercholesteremia     Hypertension     Lower back pain     Scoliosis      Past Surgical History:   Procedure Laterality Date    ANGIOPLASTY  1998/2008    BLADDER SURGERY  03/01/2014    Polyp removal    HC REMOVAL OF TONSILS,<11 Y/O      Description: Tonsillectomy;  Recorded: 06/10/2008;    HERNIA REPAIR  01/01/2009     Bilateral    PHACOEMULSIFICATION CLEAR CORNEA WITH STANDARD INTRAOCULAR LENS IMPLANT Right 07/10/2014    Procedure: Right Cataract Extraction with Intraocular Lens Implantation;  Surgeon: Karl Nava MD;  Location: [facility];  Service:     PHACOEMULSIFICATION CLEAR CORNEA WITH STANDARD INTRAOCULAR LENS IMPLANT Left 08/14/2014    Procedure: CATARACT EXTRACTION WITH INTRAOCULAR LENS IMPLANTATION LEFT EYE;  Surgeon: Karl Nava MD;  Location: [facility];  Service:     right finger surgery Right     Extensor Tendon Repair    TONSILLECTOMY       Current Outpatient Medications   Medication Sig Dispense Refill    amLODIPine (NORVASC) 5 MG tablet TAKE 1 TABLET(5 MG) BY MOUTH DAILY 90 tablet 2    aspirin 81 MG tablet [ASPIRIN 81 MG TABLET] Take 81 mg by mouth daily.      atorvastatin (LIPITOR) 80 MG tablet TAKE 1/2 TABLET(40 MG) BY MOUTH EVERY EVENING. 45 tablet 2    clopidogrel (PLAVIX) 75 MG tablet TAKE 1 TABLET(75 MG) BY MOUTH DAILY 90 tablet 0    hydrochlorothiazide (HYDRODIURIL) 25 MG tablet TAKE 1 TABLET(25 MG) BY MOUTH DAILY. 90 tablet 2    levothyroxine (SYNTHROID/LEVOTHROID) 50 MCG tablet Take 1 tablet (50 mcg) by mouth every morning (before breakfast). 90 tablet 3    lisinopril (ZESTRIL) 20 MG tablet TAKE 1 TABLET BY MOUTH ONCE DAILY 180 tablet 1    metoprolol succinate ER (TOPROL XL) 25 MG 24 hr tablet TAKE 1 TABLET(25 MG) BY MOUTH DAILY 90 tablet 3    multivitamin with minerals (THERA-M) 9 mg iron-400 mcg Tab tablet [MULTIVITAMIN WITH MINERALS (THERA-M) 9 MG IRON-400 MCG TAB TABLET] Take 1 tablet by mouth daily.      omeprazole (PRILOSEC) 20 MG capsule [OMEPRAZOLE (PRILOSEC) 20 MG CAPSULE] Take 20 mg by mouth daily before breakfast.      sildenafil (VIAGRA) 50 MG tablet [SILDENAFIL (VIAGRA) 50 MG TABLET] Take 1 tablet (50 mg total) by mouth daily as needed for erectile dysfunction. 16 tablet 5       No Known Allergies     Social History     Tobacco Use    Smoking status: Some Days     Types:  "Cigars     Last attempt to quit: 1985     Years since quittin.4     Passive exposure: Current    Smokeless tobacco: Never    Tobacco comments:     occasional cigar   Substance Use Topics    Alcohol use: Yes     Comment: 2 drinks per month       History   Drug Use No         ROS:  Constitutional: No fever or unexplained weight loss.     HEENT: Negative for ear pain, changes in hearing, frequent nosebleeds, nasal congestion, runny nose, sore throat, loose teeth. Denies use of dentures or partials.    Eyes: Denies any changes in vision or eye pain.    Respiratory: Negative for cough or wheezing.  Shortness of breath on exertion.    Cardiovascular: Negative except as noted in HPI.     Gastrointestinal: Negative for nausea, vomiting, abdominal pain, uncontrolled heartburn or changes in bowel movements.    Genitourinary: Negative for any urinary symptoms or changes in urinary habits.    Integumentary: Negative for any rash.    Musculoskeletal: Denies any new joint pain.     Neurological: Negative for severe dizziness, headaches or numbness.    Psychiatric: No severe anxiety or significant depression.  No issues with sleep.  Denies recreational drug use or regular use of alcohol.    Allergic/immunologic: Negative for any history of complications with anesthesia.      Objective    /88 (BP Location: Right arm, Patient Position: Sitting)   Pulse 89   Temp 97.6  F (36.4  C)   Resp 14   Ht 1.753 m (5' 9\")   Wt 100.7 kg (222 lb)   SpO2 96%   BMI 32.78 kg/m     Estimated body mass index is 32.78 kg/m  as calculated from the following:    Height as of this encounter: 1.753 m (5' 9\").    Weight as of this encounter: 100.7 kg (222 lb).  Physical Exam  Constitutional: In no acute distress.  Clean appearance.  Ears: Bilateral TMs are intact without any erythema or effusion.  Grossly normal hearing.  Wears hearing aids.  Oropharynx: Normal mucosa.  Dentition and gingiva is appropriate.  Posterior oropharynx without " any abnormalities.  Neck: Supple.  Trachea is midline.  No thyromegaly.  Neck is without tenderness or masses.  Cardiovascular: Regular rate and rhythm.  Normal peripheral perfusion.  No edema.    Respiratory: Lungs are clear bilaterally.  Normal respiratory effort.  Skin: Skin is pink, warm and dry. No rashes.  Gastrointestinal: Soft and flat.  Normal bowel sounds.  Nontender throughout upon palpation.  No organomegaly or masses.    Musculoskeletal:  Normal range of motion of extremities.  Gait normal.  Able to mount exam table without difficulties.  Psychiatric: Appropriate affect and demeanor.  Memory intact.  Good insight and judgment.  Neurologic: Sensation and temperature of extremities appropriate.  No tremor or involuntary movement noted.      Recent Labs   Lab Test 06/17/25  1007 04/11/25  0738   HGB 14.3 14.4    225    138   POTASSIUM 3.9 4.4   CR 0.71 0.73   A1C  --  5.6        Diagnostics  No labs were ordered during this visit.   No EKG this visit, completed in the last 90 days.    Revised Cardiac Risk Index (RCRI)  The patient has the following serious cardiovascular risks for perioperative complications:   - Coronary Artery Disease (MI, positive stress test, angina, Qs on EKG) = 1 point     RCRI Interpretation: 1 point: Class II (low risk - 0.9% complication rate)         Signed Electronically by: Maia Obrien NP  A copy of this evaluation report is provided to the requesting physician.

## 2025-06-23 ENCOUNTER — HOSPITAL ENCOUNTER (OUTPATIENT)
Facility: HOSPITAL | Age: 75
Discharge: HOME | End: 2025-06-23
Attending: STUDENT IN AN ORGANIZED HEALTH CARE EDUCATION/TRAINING PROGRAM | Admitting: STUDENT IN AN ORGANIZED HEALTH CARE EDUCATION/TRAINING PROGRAM
Payer: COMMERCIAL

## 2025-06-23 VITALS
WEIGHT: 220 LBS | HEIGHT: 69 IN | HEART RATE: 68 BPM | OXYGEN SATURATION: 94 % | BODY MASS INDEX: 32.58 KG/M2 | SYSTOLIC BLOOD PRESSURE: 149 MMHG | TEMPERATURE: 97.9 F | DIASTOLIC BLOOD PRESSURE: 75 MMHG | RESPIRATION RATE: 30 BRPM

## 2025-06-23 DIAGNOSIS — I21.11 ST ELEVATION MYOCARDIAL INFARCTION INVOLVING RIGHT CORONARY ARTERY (H): ICD-10-CM

## 2025-06-23 DIAGNOSIS — Z95.5 S/P RIGHT CORONARY ARTERY (RCA) STENT PLACEMENT: Primary | ICD-10-CM

## 2025-06-23 DIAGNOSIS — I10 ESSENTIAL HYPERTENSION, BENIGN: ICD-10-CM

## 2025-06-23 DIAGNOSIS — R94.39 ABNORMAL CARDIOVASCULAR STRESS TEST: ICD-10-CM

## 2025-06-23 DIAGNOSIS — Z01.812 PRE-PROCEDURE LAB EXAM: ICD-10-CM

## 2025-06-23 DIAGNOSIS — I49.3 PVC'S (PREMATURE VENTRICULAR CONTRACTIONS): ICD-10-CM

## 2025-06-23 DIAGNOSIS — Z95.5 S/P CORONARY ARTERY STENT PLACEMENT: Primary | ICD-10-CM

## 2025-06-23 DIAGNOSIS — R06.09 DOE (DYSPNEA ON EXERTION): ICD-10-CM

## 2025-06-23 DIAGNOSIS — I25.10 CORONARY ARTERY DISEASE INVOLVING NATIVE CORONARY ARTERY OF NATIVE HEART WITHOUT ANGINA PECTORIS: ICD-10-CM

## 2025-06-23 PROBLEM — Z98.890 STATUS POST CORONARY ANGIOGRAM: Status: ACTIVE | Noted: 2025-06-23

## 2025-06-23 LAB
ACT BLD: 251 SECONDS (ref 74–150)
ACT BLD: 284 SECONDS (ref 74–150)
ACT BLD: 284 SECONDS (ref 74–150)
ATRIAL RATE - MUSE: 65 BPM
ATRIAL RATE - MUSE: 70 BPM
DIASTOLIC BLOOD PRESSURE - MUSE: NORMAL MMHG
DIASTOLIC BLOOD PRESSURE - MUSE: NORMAL MMHG
INTERPRETATION ECG - MUSE: NORMAL
INTERPRETATION ECG - MUSE: NORMAL
P AXIS - MUSE: 40 DEGREES
P AXIS - MUSE: 41 DEGREES
PR INTERVAL - MUSE: 186 MS
PR INTERVAL - MUSE: 190 MS
QRS DURATION - MUSE: 138 MS
QRS DURATION - MUSE: 138 MS
QT - MUSE: 434 MS
QT - MUSE: 446 MS
QTC - MUSE: 463 MS
QTC - MUSE: 468 MS
R AXIS - MUSE: 73 DEGREES
R AXIS - MUSE: 74 DEGREES
SYSTOLIC BLOOD PRESSURE - MUSE: NORMAL MMHG
SYSTOLIC BLOOD PRESSURE - MUSE: NORMAL MMHG
T AXIS - MUSE: 15 DEGREES
T AXIS - MUSE: 3 DEGREES
VENTRICULAR RATE- MUSE: 65 BPM
VENTRICULAR RATE- MUSE: 70 BPM

## 2025-06-23 PROCEDURE — 999N000054 HC STATISTIC EKG NON-CHARGEABLE

## 2025-06-23 PROCEDURE — C1894 INTRO/SHEATH, NON-LASER: HCPCS | Performed by: STUDENT IN AN ORGANIZED HEALTH CARE EDUCATION/TRAINING PROGRAM

## 2025-06-23 PROCEDURE — 93010 ELECTROCARDIOGRAM REPORT: CPT | Mod: 77 | Performed by: INTERNAL MEDICINE

## 2025-06-23 PROCEDURE — 250N000013 HC RX MED GY IP 250 OP 250 PS 637: Performed by: STUDENT IN AN ORGANIZED HEALTH CARE EDUCATION/TRAINING PROGRAM

## 2025-06-23 PROCEDURE — C1753 CATH, INTRAVAS ULTRASOUND: HCPCS | Performed by: STUDENT IN AN ORGANIZED HEALTH CARE EDUCATION/TRAINING PROGRAM

## 2025-06-23 PROCEDURE — 92972 PERQ TRLUML CORONRY LITHOTRP: CPT | Mod: RC | Performed by: STUDENT IN AN ORGANIZED HEALTH CARE EDUCATION/TRAINING PROGRAM

## 2025-06-23 PROCEDURE — C1887 CATHETER, GUIDING: HCPCS | Performed by: STUDENT IN AN ORGANIZED HEALTH CARE EDUCATION/TRAINING PROGRAM

## 2025-06-23 PROCEDURE — C1874 STENT, COATED/COV W/DEL SYS: HCPCS | Performed by: STUDENT IN AN ORGANIZED HEALTH CARE EDUCATION/TRAINING PROGRAM

## 2025-06-23 PROCEDURE — 93458 L HRT ARTERY/VENTRICLE ANGIO: CPT | Performed by: STUDENT IN AN ORGANIZED HEALTH CARE EDUCATION/TRAINING PROGRAM

## 2025-06-23 PROCEDURE — 250N000013 HC RX MED GY IP 250 OP 250 PS 637: Performed by: NURSE PRACTITIONER

## 2025-06-23 PROCEDURE — 99152 MOD SED SAME PHYS/QHP 5/>YRS: CPT | Performed by: STUDENT IN AN ORGANIZED HEALTH CARE EDUCATION/TRAINING PROGRAM

## 2025-06-23 PROCEDURE — 255N000002 HC RX 255 OP 636: Performed by: STUDENT IN AN ORGANIZED HEALTH CARE EDUCATION/TRAINING PROGRAM

## 2025-06-23 PROCEDURE — C1769 GUIDE WIRE: HCPCS | Performed by: STUDENT IN AN ORGANIZED HEALTH CARE EDUCATION/TRAINING PROGRAM

## 2025-06-23 PROCEDURE — C9600 PERC DRUG-EL COR STENT SING: HCPCS | Performed by: STUDENT IN AN ORGANIZED HEALTH CARE EDUCATION/TRAINING PROGRAM

## 2025-06-23 PROCEDURE — 250N000011 HC RX IP 250 OP 636: Performed by: STUDENT IN AN ORGANIZED HEALTH CARE EDUCATION/TRAINING PROGRAM

## 2025-06-23 PROCEDURE — 93010 ELECTROCARDIOGRAM REPORT: CPT | Performed by: INTERNAL MEDICINE

## 2025-06-23 PROCEDURE — C1761 HC OR CATH, TRANS INTRA LITHO/CORONARY: HCPCS | Performed by: STUDENT IN AN ORGANIZED HEALTH CARE EDUCATION/TRAINING PROGRAM

## 2025-06-23 PROCEDURE — 93005 ELECTROCARDIOGRAM TRACING: CPT

## 2025-06-23 PROCEDURE — 99153 MOD SED SAME PHYS/QHP EA: CPT | Performed by: STUDENT IN AN ORGANIZED HEALTH CARE EDUCATION/TRAINING PROGRAM

## 2025-06-23 PROCEDURE — 272N000001 HC OR GENERAL SUPPLY STERILE: Performed by: STUDENT IN AN ORGANIZED HEALTH CARE EDUCATION/TRAINING PROGRAM

## 2025-06-23 PROCEDURE — 92978 ENDOLUMINL IVUS OCT C 1ST: CPT | Mod: RC | Performed by: STUDENT IN AN ORGANIZED HEALTH CARE EDUCATION/TRAINING PROGRAM

## 2025-06-23 PROCEDURE — C1725 CATH, TRANSLUMIN NON-LASER: HCPCS | Performed by: STUDENT IN AN ORGANIZED HEALTH CARE EDUCATION/TRAINING PROGRAM

## 2025-06-23 PROCEDURE — 85347 COAGULATION TIME ACTIVATED: CPT

## 2025-06-23 PROCEDURE — 250N000009 HC RX 250: Performed by: STUDENT IN AN ORGANIZED HEALTH CARE EDUCATION/TRAINING PROGRAM

## 2025-06-23 DEVICE — STENT CORONARY DES SYNERGY XD MR US 3.50X38MM H7493941838350: Type: IMPLANTABLE DEVICE | Status: FUNCTIONAL

## 2025-06-23 DEVICE — STENT CORONARY DES SYNERGY XD MR US 4.50X16MM H7493941816450: Type: IMPLANTABLE DEVICE | Status: FUNCTIONAL

## 2025-06-23 RX ORDER — ASPIRIN 81 MG/1
81 TABLET ORAL DAILY
Qty: 30 TABLET | Refills: 3 | Status: SHIPPED | OUTPATIENT
Start: 2025-06-24 | End: 2025-06-23

## 2025-06-23 RX ORDER — ONDANSETRON 4 MG/1
4 TABLET, ORALLY DISINTEGRATING ORAL EVERY 6 HOURS PRN
Status: DISCONTINUED | OUTPATIENT
Start: 2025-06-23 | End: 2025-06-23 | Stop reason: HOSPADM

## 2025-06-23 RX ORDER — METOPROLOL TARTRATE 1 MG/ML
5 INJECTION, SOLUTION INTRAVENOUS
Status: DISCONTINUED | OUTPATIENT
Start: 2025-06-23 | End: 2025-06-23 | Stop reason: HOSPADM

## 2025-06-23 RX ORDER — ASPIRIN 81 MG/1
81 TABLET ORAL DAILY
Status: DISCONTINUED | OUTPATIENT
Start: 2025-06-24 | End: 2025-06-23 | Stop reason: HOSPADM

## 2025-06-23 RX ORDER — NALOXONE HYDROCHLORIDE 0.4 MG/ML
0.2 INJECTION, SOLUTION INTRAMUSCULAR; INTRAVENOUS; SUBCUTANEOUS
Status: DISCONTINUED | OUTPATIENT
Start: 2025-06-23 | End: 2025-06-23 | Stop reason: HOSPADM

## 2025-06-23 RX ORDER — DIAZEPAM 5 MG/1
5 TABLET ORAL ONCE
Status: COMPLETED | OUTPATIENT
Start: 2025-06-23 | End: 2025-06-23

## 2025-06-23 RX ORDER — NALOXONE HYDROCHLORIDE 0.4 MG/ML
0.4 INJECTION, SOLUTION INTRAMUSCULAR; INTRAVENOUS; SUBCUTANEOUS
Status: DISCONTINUED | OUTPATIENT
Start: 2025-06-23 | End: 2025-06-23 | Stop reason: HOSPADM

## 2025-06-23 RX ORDER — IODIXANOL 320 MG/ML
INJECTION, SOLUTION INTRAVASCULAR
Status: DISCONTINUED | OUTPATIENT
Start: 2025-06-23 | End: 2025-06-23 | Stop reason: HOSPADM

## 2025-06-23 RX ORDER — NITROGLYCERIN 0.4 MG/1
0.4 TABLET SUBLINGUAL EVERY 5 MIN PRN
Status: DISCONTINUED | OUTPATIENT
Start: 2025-06-23 | End: 2025-06-23 | Stop reason: HOSPADM

## 2025-06-23 RX ORDER — CLOPIDOGREL BISULFATE 75 MG/1
TABLET ORAL
Status: DISCONTINUED | OUTPATIENT
Start: 2025-06-23 | End: 2025-06-23 | Stop reason: HOSPADM

## 2025-06-23 RX ORDER — ACETAMINOPHEN 325 MG/1
650 TABLET ORAL EVERY 4 HOURS PRN
Status: DISCONTINUED | OUTPATIENT
Start: 2025-06-23 | End: 2025-06-23 | Stop reason: HOSPADM

## 2025-06-23 RX ORDER — HEPARIN SODIUM 200 [USP'U]/100ML
INJECTION, SOLUTION INTRAVENOUS
Status: DISCONTINUED | OUTPATIENT
Start: 2025-06-23 | End: 2025-06-23 | Stop reason: HOSPADM

## 2025-06-23 RX ORDER — CLOPIDOGREL BISULFATE 75 MG/1
75 TABLET ORAL DAILY
Qty: 90 TABLET | Refills: 3 | Status: SHIPPED | OUTPATIENT
Start: 2025-06-23

## 2025-06-23 RX ORDER — ATROPINE SULFATE 0.1 MG/ML
0.5 INJECTION INTRAVENOUS
Status: DISCONTINUED | OUTPATIENT
Start: 2025-06-23 | End: 2025-06-23 | Stop reason: HOSPADM

## 2025-06-23 RX ORDER — FLUMAZENIL 0.1 MG/ML
0.2 INJECTION, SOLUTION INTRAVENOUS
Status: DISCONTINUED | OUTPATIENT
Start: 2025-06-23 | End: 2025-06-23 | Stop reason: HOSPADM

## 2025-06-23 RX ORDER — ONDANSETRON 2 MG/ML
4 INJECTION INTRAMUSCULAR; INTRAVENOUS EVERY 6 HOURS PRN
Status: DISCONTINUED | OUTPATIENT
Start: 2025-06-23 | End: 2025-06-23 | Stop reason: HOSPADM

## 2025-06-23 RX ORDER — CLOPIDOGREL BISULFATE 75 MG/1
75 TABLET ORAL DAILY
Status: DISCONTINUED | OUTPATIENT
Start: 2025-06-24 | End: 2025-06-23 | Stop reason: HOSPADM

## 2025-06-23 RX ORDER — HYDRALAZINE HYDROCHLORIDE 20 MG/ML
10 INJECTION INTRAMUSCULAR; INTRAVENOUS EVERY 4 HOURS PRN
Status: DISCONTINUED | OUTPATIENT
Start: 2025-06-23 | End: 2025-06-23 | Stop reason: HOSPADM

## 2025-06-23 RX ORDER — OXYCODONE HYDROCHLORIDE 5 MG/1
10 TABLET ORAL EVERY 4 HOURS PRN
Status: DISCONTINUED | OUTPATIENT
Start: 2025-06-23 | End: 2025-06-23 | Stop reason: HOSPADM

## 2025-06-23 RX ORDER — ASPIRIN 325 MG
325 TABLET ORAL ONCE
Status: COMPLETED | OUTPATIENT
Start: 2025-06-23 | End: 2025-06-23

## 2025-06-23 RX ORDER — FENTANYL CITRATE 50 UG/ML
25 INJECTION, SOLUTION INTRAMUSCULAR; INTRAVENOUS
Status: DISCONTINUED | OUTPATIENT
Start: 2025-06-23 | End: 2025-06-23 | Stop reason: HOSPADM

## 2025-06-23 RX ORDER — SODIUM CHLORIDE 9 MG/ML
INJECTION, SOLUTION INTRAVENOUS CONTINUOUS
Status: DISCONTINUED | OUTPATIENT
Start: 2025-06-23 | End: 2025-06-23 | Stop reason: HOSPADM

## 2025-06-23 RX ORDER — HEPARIN SODIUM 1000 [USP'U]/ML
INJECTION, SOLUTION INTRAVENOUS; SUBCUTANEOUS
Status: DISCONTINUED | OUTPATIENT
Start: 2025-06-23 | End: 2025-06-23 | Stop reason: HOSPADM

## 2025-06-23 RX ORDER — OXYCODONE HYDROCHLORIDE 5 MG/1
5 TABLET ORAL EVERY 4 HOURS PRN
Status: DISCONTINUED | OUTPATIENT
Start: 2025-06-23 | End: 2025-06-23 | Stop reason: HOSPADM

## 2025-06-23 RX ORDER — LIDOCAINE 40 MG/G
CREAM TOPICAL
Status: DISCONTINUED | OUTPATIENT
Start: 2025-06-23 | End: 2025-06-23 | Stop reason: HOSPADM

## 2025-06-23 RX ORDER — ASPIRIN 81 MG/1
243 TABLET, CHEWABLE ORAL ONCE
Status: COMPLETED | OUTPATIENT
Start: 2025-06-23 | End: 2025-06-23

## 2025-06-23 RX ORDER — FENTANYL CITRATE 50 UG/ML
INJECTION, SOLUTION INTRAMUSCULAR; INTRAVENOUS
Status: DISCONTINUED | OUTPATIENT
Start: 2025-06-23 | End: 2025-06-23 | Stop reason: HOSPADM

## 2025-06-23 RX ADMIN — DIAZEPAM 5 MG: 5 TABLET ORAL at 07:46

## 2025-06-23 ASSESSMENT — ACTIVITIES OF DAILY LIVING (ADL)
ADLS_ACUITY_SCORE: 41

## 2025-06-23 NOTE — Clinical Note
The first balloon was inserted into the right coronary artery and middle right coronary artery.Max pressure = 16 donald. Total duration = 8 seconds.     Max pressure = 18 donald. Total duration = 17 seconds.    Balloon reinflated a second time: Max pressure = 18 donald. Total duration = 17 seconds.  Balloon reinflated a third time: Max pressure = 18 donald. Total duration = 8 seconds.  Balloon reinflated a fourth time: Max pressure = 18 donald. Total  duration = 7 seconds.

## 2025-06-23 NOTE — Clinical Note
The first balloon was inserted into the right coronary artery and middle right coronary artery.Max pressure = 16 donald. Total duration = 15 seconds.     Max pressure = 16 donald. Total duration = 13 seconds.    Balloon reinflated a second time: Max pressure = 16 donald. Total duration = 13 seconds.  Balloon reinflated a third time: Max pressure = 18 donald. Total duration = 4 seconds.

## 2025-06-23 NOTE — Clinical Note
The first balloon was inserted into the right coronary artery and middle right coronary artery.Max pressure = 16 donald. Total duration = 5 seconds.     Max pressure = 18 donald. Total duration = 6 seconds.    Balloon reinflated a second time: Max pressure = 18 donald. Total duration = 6 seconds.  Balloon reinflated a third time: Max pressure = 18 donald. Total duration = 6 seconds.

## 2025-06-23 NOTE — Clinical Note
The first balloon was inserted into the right coronary artery and middle right coronary artery.Max pressure = 18 odnald. Total duration = 8 seconds.     Max pressure = 18 donald. Total duration = 8 seconds.    Balloon reinflated a second time: Max pressure = 18 donald. Total duration = 8 seconds.  Balloon reinflated a third time: Max pressure = 18 donald. Total duration = 2 seconds.  Balloon reinflated a fourth time: Max pressure = 18 donald. Total du ration = 5 seconds.

## 2025-06-23 NOTE — INTERVAL H&P NOTE
"I have reviewed the surgical (or preoperative) H&P that is linked to this encounter, and examined the patient. There are no significant changes    Clinical Conditions Present on Arrival:  Clinically Significant Risk Factors Present on Admission         # Hyponatremia: Lowest Na = 135 mmol/L in last 30 days, will monitor as appropriate  # Hypochloremia: Lowest Cl = 97 mmol/L in last 30 days, will monitor as appropriate         # Drug Induced Platelet Defect: home medication list includes an antiplatelet medication      # Obesity: Estimated body mass index is 32.49 kg/m  as calculated from the following:    Height as of this encounter: 1.753 m (5' 9\").    Weight as of this encounter: 99.8 kg (220 lb).       "

## 2025-06-23 NOTE — Clinical Note
Stent deployed in the middle right coronary artery. Max pressure = 16 donald. Total duration = 9 seconds.

## 2025-06-23 NOTE — PRE-PROCEDURE
GENERAL PRE-PROCEDURE:   Procedure:  Coronary angiogram with possible PCI  Date/Time:  6/23/2025 7:01 AM    Written consent obtained?: Yes    Risks and benefits: Risks, benefits and alternatives were discussed    Consent given by:  Patient  Patient states understanding of procedure being performed: Yes    Patient's understanding of procedure matches consent: Yes    Procedure consent matches procedure scheduled: Yes    Expected level of sedation:  Moderate  Appropriately NPO:  Yes  ASA Class:  3 (CAD with hx of MI; s/p multi-vessel PCI, PVCs, HTN, HLD, GERD, hx of Bladder CA, Class I obesity; BMI 32.78kg/m2)  Mallampati  :  Grade 2- soft palate, base of uvula, tonsillar pillars, and portion of posterior pharyngeal wall visible  Lungs:  Lungs clear with good breath sounds bilaterally  Heart:  Systolic murmur  History & Physical reviewed:  History and physical reviewed and updates made (see comment)  H&P Comments:  Clinically Significant Risk Factors Present on Admission    Cardiovascular : CAD with hx of MI; s/p mLAD and LCx PCI 2016, PVCs, HTN, HLD, Class I obesity; BMI 32.78kg/m2    Fluid & Electrolyte Disorders : Not present on admission    Gastroenterology : GERD    Hematology/Oncology : hx of Bladder CA    Nephrology : Not present on admission    Neurology : Not present on admission    Pulmonology : Not present on admission    Systemic : Class I obesity; BMI 32.78kg/m2    Statement of review:  I have reviewed the lab findings, diagnostic data, medications, and the plan for sedation

## 2025-06-23 NOTE — Clinical Note
Stent deployed in the middle right coronary artery. Max pressure = 16 donald. Total duration = 7 seconds.

## 2025-06-23 NOTE — DISCHARGE INSTRUCTIONS

## 2025-06-23 NOTE — Clinical Note
The first balloon was inserted into the right coronary artery and middle right coronary artery.Max pressure = 16 donald. Total duration = 3 seconds.     Max pressure = 18 donald. Total duration = 25 seconds.    Balloon reinflated a second time: Max pressure = 18 donald. Total duration = 25 seconds.  Balloon reinflated a third time: Max pressure = 18 donald. Total duration = 4 seconds.  Balloon reinflated a fourth time: Max pressure = 18 donald. Total  duration = 6 seconds.

## 2025-06-23 NOTE — Clinical Note
The first balloon was inserted into the right coronary artery and middle right coronary artery.Max pressure = 18 donald. Total duration = 6 seconds.     Max pressure = 20 donald. Total duration = 19 seconds.    Balloon reinflated a second time: Max pressure = 20 donald. Total duration = 19 seconds.  Balloon reinflated a third time: Max pressure = 20 donald. Total duration = 11 seconds.  Balloon reinflated a fourth time: Max pressure = 16 donald. Total  duration = 4 seconds.

## 2025-08-05 ENCOUNTER — TRANSFERRED RECORDS (OUTPATIENT)
Dept: HEALTH INFORMATION MANAGEMENT | Facility: CLINIC | Age: 75
End: 2025-08-05
Payer: COMMERCIAL

## 2025-09-03 ENCOUNTER — OFFICE VISIT (OUTPATIENT)
Dept: CARDIOLOGY | Facility: CLINIC | Age: 75
End: 2025-09-03
Payer: COMMERCIAL

## 2025-09-03 VITALS
RESPIRATION RATE: 16 BRPM | HEART RATE: 83 BPM | SYSTOLIC BLOOD PRESSURE: 116 MMHG | WEIGHT: 221 LBS | BODY MASS INDEX: 32.64 KG/M2 | DIASTOLIC BLOOD PRESSURE: 70 MMHG | OXYGEN SATURATION: 96 %

## 2025-09-03 DIAGNOSIS — I25.10 CORONARY ARTERY DISEASE INVOLVING NATIVE CORONARY ARTERY OF NATIVE HEART WITHOUT ANGINA PECTORIS: ICD-10-CM

## 2025-09-03 DIAGNOSIS — I10 ESSENTIAL HYPERTENSION, BENIGN: ICD-10-CM

## 2025-09-03 DIAGNOSIS — E78.00 PURE HYPERCHOLESTEROLEMIA: ICD-10-CM

## 2025-09-03 DIAGNOSIS — Z95.5 S/P RIGHT CORONARY ARTERY (RCA) STENT PLACEMENT: Primary | ICD-10-CM

## 2025-09-03 DIAGNOSIS — I21.11 ST ELEVATION MYOCARDIAL INFARCTION INVOLVING RIGHT CORONARY ARTERY (H): ICD-10-CM

## 2025-09-03 DIAGNOSIS — E66.811 CLASS 1 OBESITY WITH SERIOUS COMORBIDITY IN ADULT, UNSPECIFIED BMI, UNSPECIFIED OBESITY TYPE: ICD-10-CM

## 2025-09-03 PROCEDURE — 99214 OFFICE O/P EST MOD 30 MIN: CPT

## 2025-09-03 PROCEDURE — 3078F DIAST BP <80 MM HG: CPT

## 2025-09-03 PROCEDURE — 3074F SYST BP LT 130 MM HG: CPT

## (undated) DEVICE — SYR ANGIOGRAPHY MULTIUSE KIT ACIST 014612

## (undated) DEVICE — VALVE HEMOSTASIS .096" COPILOT MECH 1003331

## (undated) DEVICE — EXCHANGE WIRE .035 260 STAR/JFC/035/260/ M001491681

## (undated) DEVICE — CATH BALLOON NC EMERGE 2.50X12MM H7493926712250

## (undated) DEVICE — CATH BALLOON NC EMERGE 4.50X12MM H7493926712450

## (undated) DEVICE — KIT HAND CONTROL ACIST 014644 AR-P54

## (undated) DEVICE — ELECTRODE DEFIB CADENCE 22550R

## (undated) DEVICE — DEVICE INFLATION SYR W/ HEMOSTASIS VALVE 12IN EXT IN4904

## (undated) DEVICE — CATH BALLO0N SHOCKWAVE C2+ 4.0 X12MM CORONARY C2PIVL4012

## (undated) DEVICE — CATH BALLOON NC EMERGE 4.00X12MM H7493926712400

## (undated) DEVICE — SHTH INTRO 0.021IN ID 6FR DIA

## (undated) DEVICE — CATH ANGIO JUDKINS JL3.5 6FRX100CM INFINITI 534618T

## (undated) DEVICE — CATH IVUS OPTICROSS HD 6 3.6FR 1.18MM DIA 135CML H7493935409

## (undated) DEVICE — CUSTOM PACK CORONARY SAN5BCRHEA

## (undated) DEVICE — CATH BALLOON NC EMERGE 3.50X20MM H7493926720350

## (undated) DEVICE — CATH BALLOON NC EMERGE 4.00X20MM H7493926720400

## (undated) DEVICE — CATH BALLOON NC EMERGE 2.50X20MM H7493926720250

## (undated) DEVICE — SLEEVE TR BAND RADIAL COMPRESSION DEVICE 24CM TRB24-REG

## (undated) DEVICE — CATH 6FR X 100CM, OPTITORQUE C

## (undated) DEVICE — GUIDEWIRE VASC 0.014"X190CML 3CML TIP AHW14R104S

## (undated) DEVICE — CATH GUIDING 6FR AL .75 LA6AL75

## (undated) DEVICE — MANIFOLD KIT ANGIO AUTOMATED 014613

## (undated) DEVICE — CATH BALLOON NC EMERGE 3.00X20MM H7493926720300

## (undated) DEVICE — CATH BALLO0N SHOCKWAVE C2+ 3.5 X12MM CORONARY C2PIVL3512

## (undated) RX ORDER — FENTANYL CITRATE 50 UG/ML
INJECTION, SOLUTION INTRAMUSCULAR; INTRAVENOUS
Status: DISPENSED
Start: 2025-06-23

## (undated) RX ORDER — CLOPIDOGREL 300 MG/1
TABLET, FILM COATED ORAL
Status: DISPENSED
Start: 2025-06-23

## (undated) RX ORDER — NITROGLYCERIN 5 MG/ML
VIAL (ML) INTRAVENOUS
Status: DISPENSED
Start: 2025-06-23

## (undated) RX ORDER — NICARDIPINE HCL-0.9% SOD CHLOR 1 MG/10 ML
SYRINGE (ML) INTRAVENOUS
Status: DISPENSED
Start: 2025-06-23

## (undated) RX ORDER — DIAZEPAM 5 MG/1
TABLET ORAL
Status: DISPENSED
Start: 2025-06-23